# Patient Record
Sex: MALE
[De-identification: names, ages, dates, MRNs, and addresses within clinical notes are randomized per-mention and may not be internally consistent; named-entity substitution may affect disease eponyms.]

---

## 2023-08-30 ENCOUNTER — P2P PATIENT RECORD (OUTPATIENT)
Age: 18
End: 2023-08-30

## 2024-09-09 ENCOUNTER — HOSPITAL ENCOUNTER (EMERGENCY)
Facility: HOSPITAL | Age: 19
Discharge: LEFT BEFORE DISCHARGE | End: 2024-09-09
Admitting: STUDENT IN AN ORGANIZED HEALTH CARE EDUCATION/TRAINING PROGRAM
Payer: COMMERCIAL

## 2024-09-09 ENCOUNTER — APPOINTMENT (OUTPATIENT)
Dept: GENERAL RADIOLOGY | Facility: HOSPITAL | Age: 19
End: 2024-09-09
Payer: MEDICAID

## 2024-09-09 VITALS
HEIGHT: 69 IN | DIASTOLIC BLOOD PRESSURE: 70 MMHG | HEART RATE: 73 BPM | BODY MASS INDEX: 45.91 KG/M2 | TEMPERATURE: 97.3 F | SYSTOLIC BLOOD PRESSURE: 154 MMHG | WEIGHT: 310 LBS | OXYGEN SATURATION: 96 % | RESPIRATION RATE: 20 BRPM

## 2024-09-09 LAB
ALBUMIN SERPL-MCNC: 4.2 G/DL (ref 3.5–5.2)
ALBUMIN/GLOB SERPL: 1.4 G/DL
ALP SERPL-CCNC: 131 U/L (ref 39–117)
ALT SERPL W P-5'-P-CCNC: 33 U/L (ref 1–41)
ANION GAP SERPL CALCULATED.3IONS-SCNC: 11.6 MMOL/L (ref 5–15)
AST SERPL-CCNC: 29 U/L (ref 1–40)
BASOPHILS # BLD AUTO: 0.01 10*3/MM3 (ref 0–0.2)
BASOPHILS NFR BLD AUTO: 0.3 % (ref 0–1.5)
BILIRUB SERPL-MCNC: 0.5 MG/DL (ref 0–1.2)
BUN SERPL-MCNC: 11 MG/DL (ref 6–20)
BUN/CREAT SERPL: 16.2 (ref 7–25)
CALCIUM SPEC-SCNC: 9.6 MG/DL (ref 8.6–10.5)
CHLORIDE SERPL-SCNC: 102 MMOL/L (ref 98–107)
CO2 SERPL-SCNC: 23.4 MMOL/L (ref 22–29)
CREAT SERPL-MCNC: 0.68 MG/DL (ref 0.76–1.27)
DEPRECATED RDW RBC AUTO: 40.6 FL (ref 37–54)
EGFRCR SERPLBLD CKD-EPI 2021: 135.6 ML/MIN/1.73
EOSINOPHIL # BLD AUTO: 0.07 10*3/MM3 (ref 0–0.4)
EOSINOPHIL NFR BLD AUTO: 1.8 % (ref 0.3–6.2)
ERYTHROCYTE [DISTWIDTH] IN BLOOD BY AUTOMATED COUNT: 12.4 % (ref 12.3–15.4)
GLOBULIN UR ELPH-MCNC: 3.1 GM/DL
GLUCOSE SERPL-MCNC: 341 MG/DL (ref 65–99)
HCT VFR BLD AUTO: 37.8 % (ref 37.5–51)
HGB BLD-MCNC: 13.4 G/DL (ref 13–17.7)
HOLD SPECIMEN: NORMAL
HOLD SPECIMEN: NORMAL
IMM GRANULOCYTES # BLD AUTO: 0.01 10*3/MM3 (ref 0–0.05)
IMM GRANULOCYTES NFR BLD AUTO: 0.3 % (ref 0–0.5)
LYMPHOCYTES # BLD AUTO: 1.56 10*3/MM3 (ref 0.7–3.1)
LYMPHOCYTES NFR BLD AUTO: 39.6 % (ref 19.6–45.3)
MCH RBC QN AUTO: 32.3 PG (ref 26.6–33)
MCHC RBC AUTO-ENTMCNC: 35.4 G/DL (ref 31.5–35.7)
MCV RBC AUTO: 91.1 FL (ref 79–97)
MONOCYTES # BLD AUTO: 0.34 10*3/MM3 (ref 0.1–0.9)
MONOCYTES NFR BLD AUTO: 8.6 % (ref 5–12)
NEUTROPHILS NFR BLD AUTO: 1.95 10*3/MM3 (ref 1.7–7)
NEUTROPHILS NFR BLD AUTO: 49.4 % (ref 42.7–76)
NRBC BLD AUTO-RTO: 0 /100 WBC (ref 0–0.2)
PLATELET # BLD AUTO: 125 10*3/MM3 (ref 140–450)
PMV BLD AUTO: 9.9 FL (ref 6–12)
POTASSIUM SERPL-SCNC: 4.1 MMOL/L (ref 3.5–5.2)
PROT SERPL-MCNC: 7.3 G/DL (ref 6–8.5)
RBC # BLD AUTO: 4.15 10*6/MM3 (ref 4.14–5.8)
SODIUM SERPL-SCNC: 137 MMOL/L (ref 136–145)
TROPONIN T SERPL HS-MCNC: 10 NG/L
WBC NRBC COR # BLD AUTO: 3.94 10*3/MM3 (ref 3.4–10.8)
WHOLE BLOOD HOLD COAG: NORMAL
WHOLE BLOOD HOLD SPECIMEN: NORMAL

## 2024-09-09 PROCEDURE — 84484 ASSAY OF TROPONIN QUANT: CPT | Performed by: STUDENT IN AN ORGANIZED HEALTH CARE EDUCATION/TRAINING PROGRAM

## 2024-09-09 PROCEDURE — 85025 COMPLETE CBC W/AUTO DIFF WBC: CPT | Performed by: STUDENT IN AN ORGANIZED HEALTH CARE EDUCATION/TRAINING PROGRAM

## 2024-09-09 PROCEDURE — 93005 ELECTROCARDIOGRAM TRACING: CPT | Performed by: STUDENT IN AN ORGANIZED HEALTH CARE EDUCATION/TRAINING PROGRAM

## 2024-09-09 PROCEDURE — 71045 X-RAY EXAM CHEST 1 VIEW: CPT | Performed by: RADIOLOGY

## 2024-09-09 PROCEDURE — 99284 EMERGENCY DEPT VISIT MOD MDM: CPT

## 2024-09-09 PROCEDURE — 36415 COLL VENOUS BLD VENIPUNCTURE: CPT

## 2024-09-09 PROCEDURE — 71045 X-RAY EXAM CHEST 1 VIEW: CPT

## 2024-09-09 PROCEDURE — 80053 COMPREHEN METABOLIC PANEL: CPT | Performed by: STUDENT IN AN ORGANIZED HEALTH CARE EDUCATION/TRAINING PROGRAM

## 2024-09-09 RX ORDER — ASPIRIN 81 MG/1
324 TABLET, CHEWABLE ORAL ONCE
Status: DISCONTINUED | OUTPATIENT
Start: 2024-09-09 | End: 2024-09-09 | Stop reason: HOSPADM

## 2024-09-09 RX ORDER — SODIUM CHLORIDE 0.9 % (FLUSH) 0.9 %
10 SYRINGE (ML) INJECTION AS NEEDED
Status: DISCONTINUED | OUTPATIENT
Start: 2024-09-09 | End: 2024-09-09 | Stop reason: HOSPADM

## 2024-09-09 NOTE — ED NOTES
Patient reported to ED staff that he wanted to leave against medical advise. Patient left before being provided with the benefits of staying and getting treatment versus leaving against medical advise and having worsening in condition or possible death. Patient left prior to signing AMA form.

## 2024-09-09 NOTE — ED NOTES
MEDICAL SCREENING:    Reason for Visit: Chest Pain    Patient initially seen in triage.  The patient was advised further evaluation and diagnostic testing will be needed, some of the treatment and testing will be initiated in the lobby in order to begin the process.  The patient will be returned to the waiting area for the time being and possibly be re-assessed by a subsequent ED provider.  The patient will be brought back to the treatment area in as timely manner as possible.       Dinh Hernandez,   09/09/24 1939

## 2024-09-10 ENCOUNTER — APPOINTMENT (OUTPATIENT)
Dept: ULTRASOUND IMAGING | Facility: HOSPITAL | Age: 19
End: 2024-09-10
Payer: COMMERCIAL

## 2024-09-10 ENCOUNTER — HOSPITAL ENCOUNTER (EMERGENCY)
Facility: HOSPITAL | Age: 19
Discharge: HOME | End: 2024-09-11
Attending: STUDENT IN AN ORGANIZED HEALTH CARE EDUCATION/TRAINING PROGRAM
Payer: COMMERCIAL

## 2024-09-10 DIAGNOSIS — K82.8 BILIARY DYSKINESIA: Primary | ICD-10-CM

## 2024-09-10 LAB
ALBUMIN SERPL-MCNC: 4.2 G/DL (ref 3.5–5.2)
ALBUMIN/GLOB SERPL: 1.3 G/DL
ALP SERPL-CCNC: 127 U/L (ref 39–117)
ALT SERPL W P-5'-P-CCNC: 30 U/L (ref 1–41)
ANION GAP SERPL CALCULATED.3IONS-SCNC: 12.2 MMOL/L (ref 5–15)
AST SERPL-CCNC: 31 U/L (ref 1–40)
BASOPHILS # BLD AUTO: 0.01 10*3/MM3 (ref 0–0.2)
BASOPHILS NFR BLD AUTO: 0.2 % (ref 0–1.5)
BILIRUB SERPL-MCNC: 0.6 MG/DL (ref 0–1.2)
BILIRUB UR QL STRIP: NEGATIVE
BUN SERPL-MCNC: 13 MG/DL (ref 6–20)
BUN/CREAT SERPL: 17.3 (ref 7–25)
CALCIUM SPEC-SCNC: 10.4 MG/DL (ref 8.6–10.5)
CHLORIDE SERPL-SCNC: 103 MMOL/L (ref 98–107)
CLARITY UR: CLEAR
CO2 SERPL-SCNC: 23.8 MMOL/L (ref 22–29)
COLOR UR: YELLOW
CREAT SERPL-MCNC: 0.75 MG/DL (ref 0.76–1.27)
DEPRECATED RDW RBC AUTO: 40.8 FL (ref 37–54)
EGFRCR SERPLBLD CKD-EPI 2021: 131.7 ML/MIN/1.73
EOSINOPHIL # BLD AUTO: 0.1 10*3/MM3 (ref 0–0.4)
EOSINOPHIL NFR BLD AUTO: 2.1 % (ref 0.3–6.2)
ERYTHROCYTE [DISTWIDTH] IN BLOOD BY AUTOMATED COUNT: 12.5 % (ref 12.3–15.4)
GLOBULIN UR ELPH-MCNC: 3.2 GM/DL
GLUCOSE SERPL-MCNC: 179 MG/DL (ref 65–99)
GLUCOSE UR STRIP-MCNC: ABNORMAL MG/DL
HCT VFR BLD AUTO: 39.2 % (ref 37.5–51)
HGB BLD-MCNC: 13.8 G/DL (ref 13–17.7)
HGB UR QL STRIP.AUTO: NEGATIVE
HOLD SPECIMEN: NORMAL
HOLD SPECIMEN: NORMAL
IMM GRANULOCYTES # BLD AUTO: 0.01 10*3/MM3 (ref 0–0.05)
IMM GRANULOCYTES NFR BLD AUTO: 0.2 % (ref 0–0.5)
KETONES UR QL STRIP: NEGATIVE
LEUKOCYTE ESTERASE UR QL STRIP.AUTO: NEGATIVE
LIPASE SERPL-CCNC: 35 U/L (ref 13–60)
LYMPHOCYTES # BLD AUTO: 1.96 10*3/MM3 (ref 0.7–3.1)
LYMPHOCYTES NFR BLD AUTO: 40.5 % (ref 19.6–45.3)
MCH RBC QN AUTO: 31.9 PG (ref 26.6–33)
MCHC RBC AUTO-ENTMCNC: 35.2 G/DL (ref 31.5–35.7)
MCV RBC AUTO: 90.5 FL (ref 79–97)
MONOCYTES # BLD AUTO: 0.44 10*3/MM3 (ref 0.1–0.9)
MONOCYTES NFR BLD AUTO: 9.1 % (ref 5–12)
NEUTROPHILS NFR BLD AUTO: 2.32 10*3/MM3 (ref 1.7–7)
NEUTROPHILS NFR BLD AUTO: 47.9 % (ref 42.7–76)
NITRITE UR QL STRIP: NEGATIVE
NRBC BLD AUTO-RTO: 0 /100 WBC (ref 0–0.2)
PH UR STRIP.AUTO: 6 [PH] (ref 5–8)
PLATELET # BLD AUTO: 130 10*3/MM3 (ref 140–450)
PMV BLD AUTO: 9.9 FL (ref 6–12)
POTASSIUM SERPL-SCNC: 3.5 MMOL/L (ref 3.5–5.2)
PROT SERPL-MCNC: 7.4 G/DL (ref 6–8.5)
PROT UR QL STRIP: NEGATIVE
RBC # BLD AUTO: 4.33 10*6/MM3 (ref 4.14–5.8)
SODIUM SERPL-SCNC: 139 MMOL/L (ref 136–145)
SP GR UR STRIP: 1.02 (ref 1–1.03)
UROBILINOGEN UR QL STRIP: ABNORMAL
WBC NRBC COR # BLD AUTO: 4.84 10*3/MM3 (ref 3.4–10.8)
WHOLE BLOOD HOLD COAG: NORMAL
WHOLE BLOOD HOLD SPECIMEN: NORMAL

## 2024-09-10 PROCEDURE — 83690 ASSAY OF LIPASE: CPT | Performed by: STUDENT IN AN ORGANIZED HEALTH CARE EDUCATION/TRAINING PROGRAM

## 2024-09-10 PROCEDURE — 80053 COMPREHEN METABOLIC PANEL: CPT | Performed by: STUDENT IN AN ORGANIZED HEALTH CARE EDUCATION/TRAINING PROGRAM

## 2024-09-10 PROCEDURE — 76705 ECHO EXAM OF ABDOMEN: CPT | Performed by: RADIOLOGY

## 2024-09-10 PROCEDURE — 85025 COMPLETE CBC W/AUTO DIFF WBC: CPT | Performed by: STUDENT IN AN ORGANIZED HEALTH CARE EDUCATION/TRAINING PROGRAM

## 2024-09-10 PROCEDURE — 36415 COLL VENOUS BLD VENIPUNCTURE: CPT

## 2024-09-10 PROCEDURE — 99284 EMERGENCY DEPT VISIT MOD MDM: CPT

## 2024-09-10 PROCEDURE — 81003 URINALYSIS AUTO W/O SCOPE: CPT | Performed by: STUDENT IN AN ORGANIZED HEALTH CARE EDUCATION/TRAINING PROGRAM

## 2024-09-10 PROCEDURE — 76705 ECHO EXAM OF ABDOMEN: CPT

## 2024-09-10 RX ORDER — SODIUM CHLORIDE 0.9 % (FLUSH) 0.9 %
10 SYRINGE (ML) INJECTION AS NEEDED
Status: DISCONTINUED | OUTPATIENT
Start: 2024-09-10 | End: 2024-09-11 | Stop reason: HOSPADM

## 2024-09-10 RX ORDER — ONDANSETRON 2 MG/ML
4 INJECTION INTRAMUSCULAR; INTRAVENOUS ONCE
Status: DISCONTINUED | OUTPATIENT
Start: 2024-09-10 | End: 2024-09-11 | Stop reason: HOSPADM

## 2024-09-10 RX ORDER — ONDANSETRON 4 MG/1
4 TABLET, ORALLY DISINTEGRATING ORAL EVERY 6 HOURS PRN
Qty: 20 TABLET | Refills: 0 | Status: SHIPPED | OUTPATIENT
Start: 2024-09-10 | End: 2024-09-23 | Stop reason: HOSPADM

## 2024-09-10 NOTE — ED NOTES
Patient reported to ED staff that he no longer wanted to stay any longer to be seen. Patient left prior to being provided with the risk of leaving and having worsening in his condition versus leaving against medical advise and having worsening in his condition or possible death. Patient left prior to signing AMA form. Lead RN and provider made aware.

## 2024-09-11 VITALS
RESPIRATION RATE: 16 BRPM | HEART RATE: 74 BPM | WEIGHT: 311.8 LBS | DIASTOLIC BLOOD PRESSURE: 79 MMHG | TEMPERATURE: 98.7 F | BODY MASS INDEX: 47.26 KG/M2 | HEIGHT: 68 IN | OXYGEN SATURATION: 94 % | SYSTOLIC BLOOD PRESSURE: 114 MMHG

## 2024-09-11 LAB
QT INTERVAL: 388 MS
QTC INTERVAL: 433 MS

## 2024-09-11 PROCEDURE — 63710000001 ONDANSETRON ODT 4 MG TABLET DISPERSIBLE: Performed by: STUDENT IN AN ORGANIZED HEALTH CARE EDUCATION/TRAINING PROGRAM

## 2024-09-11 RX ORDER — ONDANSETRON 4 MG/1
4 TABLET, ORALLY DISINTEGRATING ORAL ONCE
Status: COMPLETED | OUTPATIENT
Start: 2024-09-11 | End: 2024-09-11

## 2024-09-11 RX ADMIN — ONDANSETRON 4 MG: 4 TABLET, ORALLY DISINTEGRATING ORAL at 01:00

## 2024-09-11 NOTE — ED PROVIDER NOTES
Subjective     History provided by:  Patient  Abdominal Pain  Pain location:  RUQ  Pain quality: gnawing    Pain radiates to:  Does not radiate  Pain severity:  Moderate  Onset quality:  Gradual  Duration:  12 weeks  Timing:  Intermittent  Progression:  Waxing and waning  Chronicity:  Recurrent  Relieved by:  Nothing  Associated symptoms: nausea and vomiting    Associated symptoms: no chest pain, no dysuria and no fever        Review of Systems   Constitutional: Negative.  Negative for fever.   HENT: Negative.     Respiratory: Negative.     Cardiovascular: Negative.  Negative for chest pain.   Gastrointestinal:  Positive for abdominal pain, nausea and vomiting.   Endocrine: Negative.    Genitourinary: Negative.  Negative for dysuria.   Skin: Negative.    Neurological: Negative.    Psychiatric/Behavioral: Negative.     All other systems reviewed and are negative.      No past medical history on file.    No Known Allergies    No past surgical history on file.    No family history on file.    Social History     Socioeconomic History    Marital status: Single           Objective   Physical Exam  Vitals and nursing note reviewed.   Constitutional:       General: He is not in acute distress.     Appearance: He is well-developed. He is not diaphoretic.   HENT:      Head: Normocephalic and atraumatic.      Right Ear: External ear normal.      Left Ear: External ear normal.      Nose: Nose normal.   Eyes:      Conjunctiva/sclera: Conjunctivae normal.   Neck:      Vascular: No JVD.      Trachea: No tracheal deviation.   Cardiovascular:      Rate and Rhythm: Normal rate.      Heart sounds: No murmur heard.  Pulmonary:      Effort: Pulmonary effort is normal. No respiratory distress.      Breath sounds: No wheezing.   Abdominal:      General: Bowel sounds are normal.      Palpations: Abdomen is soft.      Tenderness: There is abdominal tenderness in the right upper quadrant.   Musculoskeletal:         General: No deformity.  Normal range of motion.      Cervical back: Normal range of motion and neck supple.   Skin:     General: Skin is warm and dry.      Coloration: Skin is not pale.      Findings: No erythema or rash.   Neurological:      Mental Status: He is alert and oriented to person, place, and time.      Cranial Nerves: No cranial nerve deficit.   Psychiatric:         Behavior: Behavior normal.         Thought Content: Thought content normal.         Procedures           ED Course  ED Course as of 09/10/24 2353   Tue Sep 10, 2024   2348 US gallbladder rad interpreted:  1.  Partially contracted gallbladder.  2.  No cholelithiasis  3.  No cholecystitis  4.  Normal common bile duct  5.  No free fluid in the right upper quadrant.   [RB]      ED Course User Index  [RB] Zane Santamaria II, PA                                             Medical Decision Making  Amount and/or Complexity of Data Reviewed  Labs: ordered.  Radiology: ordered.    Risk  Prescription drug management.        Final diagnoses:   Biliary dyskinesia       ED Disposition  ED Disposition       ED Disposition   Discharge    Condition   Stable    Comment   --               Mirta Srinivasan MD  [address] [facility] [address]  [facility] [address] [phone]    Schedule an appointment as soon as possible for a visit            Medication List        New Prescriptions      ondansetron ODT 4 MG disintegrating tablet  Commonly known as: ZOFRAN-ODT  Place 1 tablet on the tongue Every 6 (Six) Hours As Needed for Nausea or Vomiting.               Where to Get Your Medications        These medications were sent to [facility] [address] [place] [address] - [phone]  - [phone]   [address] [address]      Phone: [phone]   ondansetron ODT 4 MG disintegrating tablet            Zane Santamaria II, PA  09/10/24 7332

## 2024-09-11 NOTE — ED NOTES
Called [facility] for transportation back; they stated they would be on their way to get him now. Patient updated.

## 2024-09-11 NOTE — ED NOTES
MEDICAL SCREENING:    Reason for Visit: RUQ abdominal pain    Patient initially seen in triage.  The patient was advised further evaluation and diagnostic testing will be needed, some of the treatment and testing will be initiated in the lobby in order to begin the process.  The patient will be returned to the waiting area for the time being and possibly be re-assessed by a subsequent ED provider.  The patient will be brought back to the treatment area in as timely manner as possible.     Va Almeida, APRN  09/10/24 2139

## 2024-09-17 ENCOUNTER — HOSPITAL ENCOUNTER (EMERGENCY)
Facility: HOSPITAL | Age: 19
Discharge: PSYCHIATRIC OR SUBSTANCE USE FACILITY | DRG: 885 | End: 2024-09-18
Attending: EMERGENCY MEDICINE | Admitting: EMERGENCY MEDICINE
Payer: COMMERCIAL

## 2024-09-17 VITALS
TEMPERATURE: 98.4 F | HEART RATE: 87 BPM | RESPIRATION RATE: 17 BRPM | BODY MASS INDEX: 45.47 KG/M2 | WEIGHT: 307 LBS | DIASTOLIC BLOOD PRESSURE: 76 MMHG | OXYGEN SATURATION: 97 % | SYSTOLIC BLOOD PRESSURE: 129 MMHG | HEIGHT: 69 IN

## 2024-09-17 DIAGNOSIS — R45.851 SUICIDAL IDEATIONS: Primary | ICD-10-CM

## 2024-09-17 LAB
ALBUMIN SERPL-MCNC: 4 G/DL (ref 3.5–5.2)
ALBUMIN/GLOB SERPL: 1.3 G/DL
ALP SERPL-CCNC: 119 U/L (ref 39–117)
ALT SERPL W P-5'-P-CCNC: 34 U/L (ref 1–41)
AMPHET+METHAMPHET UR QL: NEGATIVE
AMPHETAMINES UR QL: NEGATIVE
ANION GAP SERPL CALCULATED.3IONS-SCNC: 9.3 MMOL/L (ref 5–15)
AST SERPL-CCNC: 35 U/L (ref 1–40)
BARBITURATES UR QL SCN: NEGATIVE
BASOPHILS # BLD AUTO: 0.01 10*3/MM3 (ref 0–0.2)
BASOPHILS NFR BLD AUTO: 0.3 % (ref 0–1.5)
BENZODIAZ UR QL SCN: NEGATIVE
BILIRUB SERPL-MCNC: 0.5 MG/DL (ref 0–1.2)
BILIRUB UR QL STRIP: NEGATIVE
BUN SERPL-MCNC: 13 MG/DL (ref 6–20)
BUN/CREAT SERPL: 19.7 (ref 7–25)
BUPRENORPHINE SERPL-MCNC: NEGATIVE NG/ML
CALCIUM SPEC-SCNC: 9.4 MG/DL (ref 8.6–10.5)
CANNABINOIDS SERPL QL: NEGATIVE
CHLORIDE SERPL-SCNC: 106 MMOL/L (ref 98–107)
CLARITY UR: ABNORMAL
CO2 SERPL-SCNC: 21.7 MMOL/L (ref 22–29)
COCAINE UR QL: NEGATIVE
COLOR UR: YELLOW
CREAT SERPL-MCNC: 0.66 MG/DL (ref 0.76–1.27)
DEPRECATED RDW RBC AUTO: 40.8 FL (ref 37–54)
EGFRCR SERPLBLD CKD-EPI 2021: 136.9 ML/MIN/1.73
EOSINOPHIL # BLD AUTO: 0.08 10*3/MM3 (ref 0–0.4)
EOSINOPHIL NFR BLD AUTO: 2.2 % (ref 0.3–6.2)
ERYTHROCYTE [DISTWIDTH] IN BLOOD BY AUTOMATED COUNT: 12.4 % (ref 12.3–15.4)
ETHANOL BLD-MCNC: <10 MG/DL (ref 0–10)
ETHANOL UR QL: <0.01 %
FENTANYL UR-MCNC: NEGATIVE NG/ML
GLOBULIN UR ELPH-MCNC: 3 GM/DL
GLUCOSE SERPL-MCNC: 272 MG/DL (ref 65–99)
GLUCOSE UR STRIP-MCNC: ABNORMAL MG/DL
HCT VFR BLD AUTO: 36.1 % (ref 37.5–51)
HGB BLD-MCNC: 12.9 G/DL (ref 13–17.7)
HGB UR QL STRIP.AUTO: NEGATIVE
HOLD SPECIMEN: NORMAL
HOLD SPECIMEN: NORMAL
IMM GRANULOCYTES # BLD AUTO: 0.01 10*3/MM3 (ref 0–0.05)
IMM GRANULOCYTES NFR BLD AUTO: 0.3 % (ref 0–0.5)
KETONES UR QL STRIP: NEGATIVE
LEUKOCYTE ESTERASE UR QL STRIP.AUTO: NEGATIVE
LYMPHOCYTES # BLD AUTO: 1.53 10*3/MM3 (ref 0.7–3.1)
LYMPHOCYTES NFR BLD AUTO: 42.1 % (ref 19.6–45.3)
MAGNESIUM SERPL-MCNC: 1.7 MG/DL (ref 1.6–2.6)
MCH RBC QN AUTO: 32.3 PG (ref 26.6–33)
MCHC RBC AUTO-ENTMCNC: 35.7 G/DL (ref 31.5–35.7)
MCV RBC AUTO: 90.3 FL (ref 79–97)
METHADONE UR QL SCN: NEGATIVE
MONOCYTES # BLD AUTO: 0.38 10*3/MM3 (ref 0.1–0.9)
MONOCYTES NFR BLD AUTO: 10.5 % (ref 5–12)
NEUTROPHILS NFR BLD AUTO: 1.62 10*3/MM3 (ref 1.7–7)
NEUTROPHILS NFR BLD AUTO: 44.6 % (ref 42.7–76)
NITRITE UR QL STRIP: NEGATIVE
NRBC BLD AUTO-RTO: 0 /100 WBC (ref 0–0.2)
OPIATES UR QL: NEGATIVE
OXYCODONE UR QL SCN: NEGATIVE
PCP UR QL SCN: NEGATIVE
PH UR STRIP.AUTO: 7 [PH] (ref 5–8)
PLATELET # BLD AUTO: 104 10*3/MM3 (ref 140–450)
PMV BLD AUTO: 10 FL (ref 6–12)
POTASSIUM SERPL-SCNC: 4 MMOL/L (ref 3.5–5.2)
PROT SERPL-MCNC: 7 G/DL (ref 6–8.5)
PROT UR QL STRIP: NEGATIVE
RBC # BLD AUTO: 4 10*6/MM3 (ref 4.14–5.8)
SODIUM SERPL-SCNC: 137 MMOL/L (ref 136–145)
SP GR UR STRIP: 1.03 (ref 1–1.03)
TRICYCLICS UR QL SCN: NEGATIVE
UROBILINOGEN UR QL STRIP: ABNORMAL
WBC NRBC COR # BLD AUTO: 3.63 10*3/MM3 (ref 3.4–10.8)
WHOLE BLOOD HOLD COAG: NORMAL
WHOLE BLOOD HOLD SPECIMEN: NORMAL

## 2024-09-17 PROCEDURE — 80053 COMPREHEN METABOLIC PANEL: CPT | Performed by: EMERGENCY MEDICINE

## 2024-09-17 PROCEDURE — 83735 ASSAY OF MAGNESIUM: CPT | Performed by: EMERGENCY MEDICINE

## 2024-09-17 PROCEDURE — 36415 COLL VENOUS BLD VENIPUNCTURE: CPT

## 2024-09-17 PROCEDURE — 99285 EMERGENCY DEPT VISIT HI MDM: CPT

## 2024-09-17 PROCEDURE — 81003 URINALYSIS AUTO W/O SCOPE: CPT | Performed by: EMERGENCY MEDICINE

## 2024-09-17 PROCEDURE — 82077 ASSAY SPEC XCP UR&BREATH IA: CPT | Performed by: EMERGENCY MEDICINE

## 2024-09-17 PROCEDURE — 85025 COMPLETE CBC W/AUTO DIFF WBC: CPT | Performed by: EMERGENCY MEDICINE

## 2024-09-17 PROCEDURE — 80307 DRUG TEST PRSMV CHEM ANLYZR: CPT | Performed by: EMERGENCY MEDICINE

## 2024-09-18 ENCOUNTER — HOSPITAL ENCOUNTER (INPATIENT)
Facility: HOSPITAL | Age: 19
LOS: 5 days | Discharge: NURSING, REHAB OR LONG-TERM CARE | End: 2024-09-23
Attending: PSYCHIATRY & NEUROLOGY | Admitting: PSYCHIATRY & NEUROLOGY
Payer: COMMERCIAL

## 2024-09-18 PROBLEM — R45.89 SUICIDAL BEHAVIOR: Status: ACTIVE | Noted: 2024-09-18

## 2024-09-18 LAB
GLUCOSE BLDC GLUCOMTR-MCNC: 177 MG/DL (ref 70–130)
GLUCOSE BLDC GLUCOMTR-MCNC: 208 MG/DL (ref 70–130)
GLUCOSE BLDC GLUCOMTR-MCNC: 261 MG/DL (ref 70–130)
GLUCOSE BLDC GLUCOMTR-MCNC: 273 MG/DL (ref 70–130)
GLUCOSE BLDC GLUCOMTR-MCNC: 320 MG/DL (ref 70–130)
HAV IGM SERPL QL IA: NORMAL
HBV CORE IGM SERPL QL IA: NORMAL
HBV SURFACE AG SERPL QL IA: NORMAL
HCV AB SER QL: NORMAL
QT INTERVAL: 408 MS
QTC INTERVAL: 443 MS

## 2024-09-18 PROCEDURE — 93005 ELECTROCARDIOGRAM TRACING: CPT | Performed by: PSYCHIATRY & NEUROLOGY

## 2024-09-18 PROCEDURE — 99223 1ST HOSP IP/OBS HIGH 75: CPT | Performed by: PSYCHIATRY & NEUROLOGY

## 2024-09-18 PROCEDURE — 63710000001 INSULIN GLARGINE PER 5 UNITS: Performed by: PSYCHIATRY & NEUROLOGY

## 2024-09-18 PROCEDURE — 63710000001 INSULIN LISPRO (HUMAN) PER 5 UNITS: Performed by: PSYCHIATRY & NEUROLOGY

## 2024-09-18 PROCEDURE — 82948 REAGENT STRIP/BLOOD GLUCOSE: CPT

## 2024-09-18 PROCEDURE — 93010 ELECTROCARDIOGRAM REPORT: CPT | Performed by: INTERNAL MEDICINE

## 2024-09-18 PROCEDURE — 80074 ACUTE HEPATITIS PANEL: CPT | Performed by: PSYCHIATRY & NEUROLOGY

## 2024-09-18 RX ORDER — IBUPROFEN 400 MG/1
400 TABLET, FILM COATED ORAL EVERY 6 HOURS PRN
Status: DISCONTINUED | OUTPATIENT
Start: 2024-09-18 | End: 2024-09-19

## 2024-09-18 RX ORDER — OXCARBAZEPINE 300 MG/1
300 TABLET, FILM COATED ORAL 2 TIMES DAILY
COMMUNITY
End: 2024-09-23 | Stop reason: HOSPADM

## 2024-09-18 RX ORDER — LURASIDONE HYDROCHLORIDE 40 MG/1
20 TABLET, FILM COATED ORAL
Status: DISCONTINUED | OUTPATIENT
Start: 2024-09-18 | End: 2024-09-23 | Stop reason: HOSPADM

## 2024-09-18 RX ORDER — HYDROXYZINE HYDROCHLORIDE 50 MG/1
50 TABLET, FILM COATED ORAL EVERY 6 HOURS PRN
Status: DISCONTINUED | OUTPATIENT
Start: 2024-09-18 | End: 2024-09-23 | Stop reason: HOSPADM

## 2024-09-18 RX ORDER — BENZTROPINE MESYLATE 1 MG/ML
1 INJECTION, SOLUTION INTRAMUSCULAR; INTRAVENOUS ONCE AS NEEDED
Status: DISCONTINUED | OUTPATIENT
Start: 2024-09-18 | End: 2024-09-23 | Stop reason: HOSPADM

## 2024-09-18 RX ORDER — FAMOTIDINE 20 MG/1
20 TABLET, FILM COATED ORAL 2 TIMES DAILY PRN
Status: DISCONTINUED | OUTPATIENT
Start: 2024-09-18 | End: 2024-09-23 | Stop reason: HOSPADM

## 2024-09-18 RX ORDER — BENZTROPINE MESYLATE 1 MG/1
2 TABLET ORAL ONCE AS NEEDED
Status: DISCONTINUED | OUTPATIENT
Start: 2024-09-18 | End: 2024-09-23 | Stop reason: HOSPADM

## 2024-09-18 RX ORDER — POLYETHYLENE GLYCOL 3350 17 G/17G
17 POWDER, FOR SOLUTION ORAL DAILY PRN
Status: DISCONTINUED | OUTPATIENT
Start: 2024-09-18 | End: 2024-09-23 | Stop reason: HOSPADM

## 2024-09-18 RX ORDER — DEXTROSE MONOHYDRATE 25 G/50ML
25 INJECTION, SOLUTION INTRAVENOUS
Status: DISCONTINUED | OUTPATIENT
Start: 2024-09-18 | End: 2024-09-23 | Stop reason: HOSPADM

## 2024-09-18 RX ORDER — HYDROXYZINE HYDROCHLORIDE 25 MG/1
25 TABLET, FILM COATED ORAL EVERY 4 HOURS PRN
Status: CANCELLED | OUTPATIENT
Start: 2024-09-18

## 2024-09-18 RX ORDER — ALUMINA, MAGNESIA, AND SIMETHICONE 2400; 2400; 240 MG/30ML; MG/30ML; MG/30ML
15 SUSPENSION ORAL EVERY 6 HOURS PRN
Status: DISCONTINUED | OUTPATIENT
Start: 2024-09-18 | End: 2024-09-23 | Stop reason: HOSPADM

## 2024-09-18 RX ORDER — LOPERAMIDE HCL 2 MG
2 CAPSULE ORAL
Status: DISCONTINUED | OUTPATIENT
Start: 2024-09-18 | End: 2024-09-23 | Stop reason: HOSPADM

## 2024-09-18 RX ORDER — RISPERIDONE 2 MG/1
2 TABLET ORAL 2 TIMES DAILY
COMMUNITY
End: 2024-09-23 | Stop reason: HOSPADM

## 2024-09-18 RX ORDER — LAMOTRIGINE 100 MG/1
25 TABLET ORAL DAILY
Status: DISCONTINUED | OUTPATIENT
Start: 2024-09-18 | End: 2024-09-23 | Stop reason: HOSPADM

## 2024-09-18 RX ORDER — TRAZODONE HYDROCHLORIDE 50 MG/1
50 TABLET, FILM COATED ORAL NIGHTLY PRN
Status: DISCONTINUED | OUTPATIENT
Start: 2024-09-18 | End: 2024-09-18

## 2024-09-18 RX ORDER — OXCARBAZEPINE 300 MG/1
300 TABLET, FILM COATED ORAL 2 TIMES DAILY
Status: CANCELLED | OUTPATIENT
Start: 2024-09-18

## 2024-09-18 RX ORDER — TRAZODONE HYDROCHLORIDE 50 MG/1
50 TABLET, FILM COATED ORAL NIGHTLY
Status: DISCONTINUED | OUTPATIENT
Start: 2024-09-18 | End: 2024-09-23 | Stop reason: HOSPADM

## 2024-09-18 RX ORDER — BENZONATATE 100 MG/1
100 CAPSULE ORAL 3 TIMES DAILY PRN
Status: DISCONTINUED | OUTPATIENT
Start: 2024-09-18 | End: 2024-09-23 | Stop reason: HOSPADM

## 2024-09-18 RX ORDER — ONDANSETRON 4 MG/1
4 TABLET, ORALLY DISINTEGRATING ORAL EVERY 6 HOURS PRN
Status: DISCONTINUED | OUTPATIENT
Start: 2024-09-18 | End: 2024-09-23 | Stop reason: HOSPADM

## 2024-09-18 RX ORDER — NICOTINE POLACRILEX 4 MG
15 LOZENGE BUCCAL
Status: DISCONTINUED | OUTPATIENT
Start: 2024-09-18 | End: 2024-09-23 | Stop reason: HOSPADM

## 2024-09-18 RX ORDER — GLUCAGON 1 MG/ML
1 KIT INJECTION
Status: DISCONTINUED | OUTPATIENT
Start: 2024-09-18 | End: 2024-09-23 | Stop reason: HOSPADM

## 2024-09-18 RX ORDER — INSULIN LISPRO 100 [IU]/ML
2-9 INJECTION, SOLUTION INTRAVENOUS; SUBCUTANEOUS
Status: DISCONTINUED | OUTPATIENT
Start: 2024-09-18 | End: 2024-09-23 | Stop reason: HOSPADM

## 2024-09-18 RX ORDER — INSULIN GLARGINE 100 [IU]/ML
22 INJECTION, SOLUTION SUBCUTANEOUS 2 TIMES DAILY
Status: ON HOLD | COMMUNITY
End: 2024-09-23

## 2024-09-18 RX ORDER — ECHINACEA PURPUREA EXTRACT 125 MG
2 TABLET ORAL AS NEEDED
Status: DISCONTINUED | OUTPATIENT
Start: 2024-09-18 | End: 2024-09-23 | Stop reason: HOSPADM

## 2024-09-18 RX ORDER — HYDROXYZINE HYDROCHLORIDE 25 MG/1
25 TABLET, FILM COATED ORAL EVERY 4 HOURS PRN
COMMUNITY

## 2024-09-18 RX ORDER — ACETAMINOPHEN 325 MG/1
650 TABLET ORAL EVERY 6 HOURS PRN
Status: DISCONTINUED | OUTPATIENT
Start: 2024-09-18 | End: 2024-09-23 | Stop reason: HOSPADM

## 2024-09-18 RX ORDER — RISPERIDONE 1 MG/1
2 TABLET ORAL 2 TIMES DAILY
Status: CANCELLED | OUTPATIENT
Start: 2024-09-18

## 2024-09-18 RX ADMIN — TRAZODONE HYDROCHLORIDE 50 MG: 50 TABLET ORAL at 21:03

## 2024-09-18 RX ADMIN — LURASIDONE HYDROCHLORIDE 20 MG: 40 TABLET, FILM COATED ORAL at 17:00

## 2024-09-18 RX ADMIN — IBUPROFEN 400 MG: 400 TABLET, FILM COATED ORAL at 08:32

## 2024-09-18 RX ADMIN — INSULIN GLARGINE 22 UNITS: 100 INJECTION, SOLUTION SUBCUTANEOUS at 10:14

## 2024-09-18 RX ADMIN — METFORMIN HYDROCHLORIDE 500 MG: 500 TABLET ORAL at 10:14

## 2024-09-18 RX ADMIN — LAMOTRIGINE 25 MG: 100 TABLET ORAL at 10:14

## 2024-09-18 RX ADMIN — INSULIN LISPRO 6 UNITS: 100 INJECTION, SOLUTION INTRAVENOUS; SUBCUTANEOUS at 11:29

## 2024-09-18 RX ADMIN — INSULIN LISPRO 6 UNITS: 100 INJECTION, SOLUTION INTRAVENOUS; SUBCUTANEOUS at 16:47

## 2024-09-18 RX ADMIN — METFORMIN HYDROCHLORIDE 500 MG: 500 TABLET ORAL at 17:00

## 2024-09-18 RX ADMIN — TRAZODONE HYDROCHLORIDE 50 MG: 50 TABLET ORAL at 02:54

## 2024-09-18 RX ADMIN — INSULIN LISPRO 4 UNITS: 100 INJECTION, SOLUTION INTRAVENOUS; SUBCUTANEOUS at 08:33

## 2024-09-18 RX ADMIN — IBUPROFEN 400 MG: 400 TABLET, FILM COATED ORAL at 02:54

## 2024-09-18 RX ADMIN — INSULIN GLARGINE 22 UNITS: 100 INJECTION, SOLUTION SUBCUTANEOUS at 21:02

## 2024-09-18 RX ADMIN — INSULIN LISPRO 7 UNITS: 100 INJECTION, SOLUTION INTRAVENOUS; SUBCUTANEOUS at 21:03

## 2024-09-18 NOTE — PROGRESS NOTES
3767    DATA:      Therapist met individually with patient this date to introduce role and to discuss hospitalization expectations. Patient agreeable. Reviewed medical record and staffed case with treatment team this date. No major issues identified.       Clinical Maneuvering/Intervention:     Therapist assisted patient in processing above session content; acknowledged and normalized patient’s thoughts, feelings, and concerns.  Discussed the therapist/patient relationship and explain the parameters and limitations of relative confidentiality.  Also discussed the importance of active participation, and honesty to the treatment process.  Encouraged the patient to discuss/vent their feelings, frustrations, and fears concerning their ongoing medical issues and validated their feelings.     Allowed patient to freely discuss issues without interruption or judgment. Provided safe, confidential environment to facilitate the development of positive therapeutic relationship and encourage open, honest communication.      Concern was voiced regarding substance use and educated patient on risks associated with use. Patient was advised to abstain from use and educated on community resources that can help with sobriety and recovery.  Patient signed consent for [facility].     Therapist addressed discharge safety planning this date. Assisted patient in identifying risk factors which would indicate the need for higher level of care after discharge;  including thoughts to harm self or others and/or self-harming behavior. Encouraged patient to call [phone],  [phone], or present to the nearest emergency room should any of these events occur. Discussed crisis intervention services and means to access.  Encouraged securing any objects of harm.       Therapist completed integrated summary, treatment plan, and initiated social history this date.  Therapist is strongly encouraging family involvement in treatment.      "  ASSESSMENT:      The patient is a 19 year old  male. Patient is 12th grade educated, special education. He is unemployed and residing at [facility].  He was raised by his parents in [place] and denies history of abuse. He has court in October for DV charges.     Per report, \"Pt presents to intake with suicidal ideation. Pt states he's been having thoughts of cutting himself with the intent of suicide. Pt states that he's been having these thoughts for the past week. He states that nothing has happened the past week to trigger these thoughts, he's just started having them again. Pt did state that he quit drinking a little over two weeks ago and has been in rehab since. Pt states that his depression and anxiety both are 8/10. Pt states that he has struggled with depression, anxiety and suicidal thoughts for a long time and has had one actual suicide attempt from cutting his wrists.\"     Today, patient was seen 1-1 in the office. Patient calm, cooperative and oriented x 4.  Patient noted to have restricted affect, congruent affect, normal thought content, linear thought processes.  Patient denies SI/HI/AVH today. He rates depression at 7/10 and anxiety at 8/10.  Patient reports poor sleep, good appetite.      Patient opens up today. He shares that he has been at [facility] x 1 week.  He reports that he is to recover from alcohol dependence and has 1 week sober. Patient reports that he has DV charges and court next month. He had been staying with his father in a motel and they had been yelling at eachother and arguing. The owner called the police and patient was charged with DV.  He was living in a shelter in [facility]. One day he was walking on the side of the road and met someone that got him in to [facility] in [facility].  He reports that he likes it there, but is just feeling overwhelmed and misses his family.      Today, patient signed consent to involve his grandmother " Bella at [phone]; No answer, left voicemail.      PLAN:       Patient to remain hospitalized this date.      Treatment team will focus efforts on stabilizing patient's acute symptoms while providing education on healthy coping and crisis management to reduce hospitalizations.   Patient requires daily psychiatrist evaluation and 24/7 nursing supervision to promote patient  safety.     Therapist will offer 1-4 individual sessions, family education, and appropriate referral.     Therapist recommends continued evaluation. Patient signed consent to return to [facility] via their staff at discharge.

## 2024-09-18 NOTE — PHARMACY PATIENT ASSISTANCE
Pharmacy checked on price of Lurasidone initiated inpatient. Per patient's plan, copay will be $0 for 1 month supply. No other issues identified at this time.    Thank you,    Page Lin, PharmD  09/18/24  14:14 EDT

## 2024-09-18 NOTE — NURSING NOTE
[facility] STAFF DROPPED OFF THE PATIENTS BELONGINGS. 5 GARBAGE BAGS OF BELONGINGS, PHONE AND EARBUDS. PHONE AND EARBUDS WERE PLACED IN THE UNIT SAFE WITH OTHER PERSONAL BELONGINGS. THE 5 GARBAGE BAGS WERE NOT INVENTORIED. THEY WERE WANDED, DO NOT OPEN SIGNS APPLIED AND PLACED IN THE UNIT CLOTHING . CLOSET.

## 2024-09-18 NOTE — NURSING NOTE
Pt reports SI to cut himself. Prior suicide attempt, scar on left wrist. Pt brought from [facility], plans to return at discharge. Pt denies HI/AVH. Denies any traumatic events or abuse. History of alcohol abuse. Stopped drinking two weeks ago. UDS negative. Reports good sleep and appetite. Rates anxiety 8/10, depression 9/10.      Medical: Diabetes, Bipolar, HTN.      Labs: glucose 272, platelets 104, RBC 4.0

## 2024-09-18 NOTE — H&P
"                                                        Psychiatry Inpatient Initial Eval (H+P)    Clinician: Brent Galindo MD      CC:SI    HPI:   Patient was admitted on the unit on 9/18/2024 and was evaluated on 09/18/24   19 y.o. male presented to ER with SI and intent, with thoughts of cutting himself.  He reported that he had been having these thoughts for the past week.  He is staying at [facility], working to maintain sobriety from alcohol.    Patient endorses depressed mood, sx of anhedonia, low energy, fatigue, decreased motivation.   Screening questions reveal a h/o prior episodes of depression, and patient responds \"yeah\" when asked if he has experienced episodes of elevated energy, euphoria, and decreased need for sleep lasting up to 3-4 days at a time.    No history of psychosis.        Available medical/psychiatric records reviewed and incorporated into the current document.     Past Psychiatric History: h/o Dx of ADHD, Bipolar Disorder, anxiety. Outpatient provider = [facility] in [place].  Patient reports a h/o numerous inpatient psy admissions, none here.  Last admission July 2024.  He has a h/o \"more than 3\" suicide attempts.      Substance Abuse History: long h/o alcohol dependence.  Sober for 1 week. Denies other drug use.    Social History:  Lives in [place], [place] with his father.   Completed High School.   Unemployed.  Does not draw disability.  Legal: court date on Oct 24, 2024 for domestic violence.  Spent 2 weeks in [facility].      Family History:   Father - Bipolar.     No Known Allergies     Past Medical History:   Diagnosis Date    Anxiety     Bipolar disorder     Depression     Diabetes     HTN (hypertension)     Suicidal behavior        Prior to Admission medications    Medication Sig Start Date End Date Taking? Authorizing Provider   hydrOXYzine (ATARAX) 25 MG tablet Take 1 tablet by mouth Every 4 (Four) Hours As Needed for Itching or Anxiety.   Yes Provider, Historical, " MD   Insulin Glargine (Lantus SoloStar) 100 UNIT/ML injection pen Inject 22 Units under the skin into the appropriate area as directed 2 (Two) Times a Day.   Yes Aki Varner MD   metFORMIN (GLUCOPHAGE) 500 MG tablet Take 1 tablet by mouth 2 (Two) Times a Day With Meals.   Yes Aki Varner MD   ondansetron ODT (ZOFRAN-ODT) 4 MG disintegrating tablet Place 1 tablet on the tongue Every 6 (Six) Hours As Needed for Nausea or Vomiting. 9/10/24  Yes Zane Santamaria II, PA   OXcarbazepine (TRILEPTAL) 300 MG tablet Take 1 tablet by mouth 2 (Two) Times a Day.   Yes Aki Varner MD   risperiDONE (risperDAL) 2 MG tablet Take 1 tablet by mouth 2 (Two) Times a Day.   Yes Aki Varner MD   sertraline (ZOLOFT) 50 MG tablet Take 1 tablet by mouth Daily.   Yes Aki Varner MD        Temp:  [96.6 °F (35.9 °C)-98.4 °F (36.9 °C)] 97.7 °F (36.5 °C)  Heart Rate:  [70-87] 70  Resp:  [17-18] 18  BP: (107-129)/(50-76) 107/50      Mental Status Exam  Mood: depressed  Affect: mood congruent  Thought Processes: linear  Thought Content: No delusions voiced  Hallucinations: Denies  Suicidal Thoughts: yes  Suicidal Plan/Intent: Denies  Hopelesness: Moderate      Review of Systems   Constitutional: Negative.    HENT: Negative.     Eyes: Negative.    Respiratory: Negative.     Cardiovascular: Negative.    Gastrointestinal: Negative.    Endocrine: Negative.    Genitourinary: Negative.    Musculoskeletal: Negative.    Skin: Negative.    Allergic/Immunologic: Negative.    Neurological: Negative.    Hematological: Negative.    All other systems reviewed and are negative.       Telemedicine Physical Exam:  Patient appears well-nourished, well-hydrated, and is alert and oriented. No acute distress noted. Moderately obese.  Skin: No visible rashes, lesions, or discoloration on exposed areas.  Head and Neck: Normocephalic, atraumatic; neck supple without visible masses.  Eyes: Pupils equal, round; visual acuity  appears normal, no reported changes in vision.  Ears: Hearing intact, no pain or discharge.  Nose: No deformity, patient denies congestion.  Throat: No sore throat, difficulty swallowing, or voice changes; oral mucosa intact.  Respiratory: Normal breathing reported, no audible wheezing.  Cardiovascular: No chest pain, palpitations, or edema; no cyanosis.  Abdomen: No pain, nausea, vomiting, or changes in bowel habits; no visible distension.  Musculoskeletal: Full range of motion in major joints; no deformities, swelling, or pain reported.  Neurological: Cranial Nerves I-XII assessed remotely. CN I: Reports intact smell sensation. CN II: Visual acuity self-reported as normal, visual fields grossly intact. CN III, IV, VI: EOMs intact, PERRLA self-reported. CN V: Reports intact facial sensation, jaw movements symmetrical. CN VII: Facial movements symmetrical on visual assessment. CN VIII: Hearing reported as intact, balance not assessed. CN IX, X: Palate elevation and voice quality appear normal. CN XI: Shoulder shrug and head rotation appear symmetrical. CN XII: Tongue midline with full range of motion on visual assessment. Limitations of telemedicine include inability to directly test visual fields, pupillary reaction, and detailed sensory or motor function.  Muscle strength appears symmetrical in all major muscle groups, as observed through simple movements (e.g., arm raises, leg lifts). No involuntary movements are noted. Patient demonstrates normal coordination through finger-to-nose testing. Gait and balance not fully assessed due to telemedicine limitations, but patient denies issues with walking or balance. Sensation to light touch is reported as symmetrical on arms and legs using household items; no numbness or tingling reported. Deep tendon reflexes not assessed due to telemedicine limitations.    Limitations: Telemedicine limits detailed exams, e.g. palpation, auscultation, and fine neurological assessments;  visual fields and pupillary reactions not fully assessed.        Labs:  Lab Results (all)       Procedure Component Value Units Date/Time    POC Glucose Once [[id number]]  (Abnormal) Collected: 09/18/24 0744    Specimen: Blood Updated: 09/18/24 0750     Glucose 208 mg/dL     Hepatitis Panel, Acute [[id number]]  (Normal) Collected: 09/18/24 0319    Specimen: Blood Updated: 09/18/24 0346     Hepatitis B Surface Ag Non-Reactive     Hep A IgM Non-Reactive     Hep B C IgM Non-Reactive     Hepatitis C Ab Non-Reactive    Narrative:      Results may be falsely decreased if patient taking Biotin.             Imaging:  Imaging Results (All)       None              Diagnosis:  Suicidal Ideation  Bipolar II Disorder    Given the high risk and/or potentially life-threatening nature of patient's presenting symptoms, patient has been admitted for safety and stabilization and placed on the appropriate level of precautions.  Patient will be assigned a Master's level therapist and encouraged to participate in both individual and group therapy on the unit.  Routine labs have been ordered.    CODE STATUS: Full Code     Hospital bed: No    Stop Risperdal  Stop zoloft  Stop Trileptal  Start trial of latuda 20mg daily with dinner  Start trial of lamotrigine 25mg Daily  Check TSH    Insomnia  - Trazodone 50mg at bedtime    DM2  - metformin 500mg twice a day  - Insulin Glargine (Lantus SoloStar) 22 units twice a day  - SSI        I have discussed with the patient the risks, benefits, side effects, and treatment alternatives to the patient's psychiatric medications. Patient has also been instructed regarding the risks versus benefits of no treatment and also the fact that treatment does not guarantee results.  Patient voices an understanding of this and accepts the risks associated with the use of this medication.   Patient is instructed to review the medication information packets provided by the pharmacy and to call me with any questions or  concerns related to the medication.  Patient agrees to notify all of their prescribers of the recent medication change, and to notify me immediately if prescribed any other medication by another provider, so as to allow me to verify that the medications I am prescribing do not interfere with the newly prescribed medication.     Further treatment and disposition planning will be developed prospectively.

## 2024-09-18 NOTE — PLAN OF CARE
Goal Outcome Evaluation:  Plan of Care Reviewed With: patient  Patient Agreement with Plan of Care: agrees     Progress: no change  Outcome Evaluation: New admit. Care plan iniated.

## 2024-09-18 NOTE — PLAN OF CARE
Problem: Adult Behavioral Health Plan of Care  Goal: Plan of Care Review  Outcome: Ongoing, Progressing  Flowsheets (Taken 9/18/2024 1107)  Consent Given to Review Plan with: Grandmother Bella  Progress: no change  Plan of Care Reviewed With: patient  Patient Agreement with Plan of Care: agrees  Outcome Evaluation: New admit. Completed social history and integrated summary  Goal: Patient-Specific Goal (Individualization)  Outcome: Ongoing, Progressing  Flowsheets  Taken 9/18/2024 1107  Patient-Specific Goals (Include Timeframe): Patient will deny SI/HI in 1-4 days. Patient will identify 1-3 healthy coping skills for depression in 1-4 days. Patient will consent to [facility] referral in 1-4 days. Patient will demonstrate improved depression symptoms in 1-7 days.  Individualized Care Needs: Therapist will offer 1-4 individual sessions focusing on CBT/DBT and relapse prevention. Therapist will offer family and safety planning. Therapist will offer aftercare referral with [facility]  Anxieties, Fears or Concerns: none identified  Taken 9/18/2024 1047  Patient Personal Strengths:   expressive of needs   expressive of emotions   coping skills   community support   realistic evaluation of current/future capabilities   motivated for treatment   motivated for recovery   positive educational history   spiritual/Church support   family/social support   resourceful   resilient  Patient Vulnerabilities:   poor impulse control   substance abuse/addiction   legal concerns  Goal: Optimized Coping Skills in Response to Life Stressors  Outcome: Ongoing, Progressing  Intervention: Promote Effective Coping Strategies  Flowsheets (Taken 9/18/2024 1107)  Supportive Measures:   active listening utilized   counseling provided  Goal: Develops/Participates in Therapeutic [place] to Support Successful Transition  Outcome: Ongoing, Progressing  Intervention: Foster Therapeutic [place]  Flowsheets (Taken 9/18/2024  0922 by Farhat Woodall, RN)  Trust Relationship/Rapport:   care explained   choices provided   emotional support provided   empathic listening provided   questions answered   questions encouraged   reassurance provided   thoughts/feelings acknowledged  Intervention: Mutually Develop Transition Plan  Flowsheets  Taken 9/18/2024 1107  Outpatient/Agency/Support Group Needs: residential services  Transition Support:   community resources reviewed   crisis management plan promoted   crisis management plan verbalized   follow-up care coordinated   follow-up care discussed  Anticipated Discharge Disposition: residential substance use unit  Taken 9/18/2024 1105  Discharge Coordination/Progress: Patient has [facility] for discharge planning and [facility]  Concerns Comments: NA  Transportation Anticipated: agency  Transportation Concerns: no car  Current Discharge Risk:   substance use/abuse   psychiatric illness  Concerns to be Addressed:   coping/stress   substance/tobacco abuse/use   suicidal   mental health   discharge planning   legal  Readmission Within the Last 30 Days: no previous admission in last 30 days  Patient/Family Anticipated Services at Transition: rehabilitation services  Patient's Choice of Community Agency(s): [facility]  Patient/Family Anticipates Transition to: inpatient rehabilitation facility  Offered/Gave Vendor List: no   Goal Outcome Evaluation:  Plan of Care Reviewed With: patient  Patient Agreement with Plan of Care: agrees  Consent Given to Review Plan with: Grandmother Bella  Progress: no change  Outcome Evaluation: New admit. Completed social history and integrated summary

## 2024-09-18 NOTE — PLAN OF CARE
Problem: Adult Behavioral Health Plan of Care  Goal: Plan of Care Review  Outcome: Ongoing, Progressing  Flowsheets  Taken 9/18/2024 1227 by Farhat Woodall, RN  Progress: improving  Outcome Evaluation: PATIENT VERBALIZES RIGHT ANKLE PAIN, SEVERE ANXIETY AND DEPRESSION AS ONLY PROBLEMS THIS SHIFT. PATIENT FREQUENTLY SEEKS OUT STAFF WITH SOMATIC COMPLAINTS. PATIENT IS AOX3, COOPERATIVE WITH NO S/S OF ACUTE DISTRESS NOTED. NEW ORDERS: LANTUS 22 UNITS, TRAZODONE 50MG, LAMIICTAL 25MG, LATUDA 2O MG, METFORMIN 500MG  Taken 9/18/2024 1107 by Ama Verdugo  Plan of Care Reviewed With: patient  Patient Agreement with Plan of Care: agrees  Taken 9/18/2024 0922 by Farhat Woodall, RN  Plan of Care Reviewed With: patient  Patient Agreement with Plan of Care: agrees   Goal Outcome Evaluation:  Plan of Care Reviewed With: patient  Patient Agreement with Plan of Care: agrees     Progress: improving  Outcome Evaluation: PATIENT VERBALIZES RIGHT ANKLE PAIN, SEVERE ANXIETY AND DEPRESSION AS ONLY PROBLEMS THIS SHIFT. PATIENT FREQUENTLY SEEKS OUT STAFF WITH SOMATIC COMPLAINTS. PATIENT IS AOX3, COOPERATIVE WITH NO S/S OF ACUTE DISTRESS NOTED. NEW ORDERS: LANTUS 22 UNITS, TRAZODONE 50MG, LAMIICTAL 25MG, LATUDA 2O MG, METFORMIN 500MG

## 2024-09-18 NOTE — PROGRESS NOTES
Navigator is helping with the following referral:    [facility] - [phone]  -Left message. 9/18  -Spoke with Kisha. They can not accept patient back. Kisha states patient has been inappropriate a couple different times and they can just not provide the care he needs.  9/18    [facility] - [phone]  -Admissions not in the office. Staff states they will have someone return call. 9/18  -Admissions still not in the office. 9/18

## 2024-09-19 ENCOUNTER — APPOINTMENT (OUTPATIENT)
Dept: GENERAL RADIOLOGY | Facility: HOSPITAL | Age: 19
End: 2024-09-19
Payer: COMMERCIAL

## 2024-09-19 LAB
GLUCOSE BLDC GLUCOMTR-MCNC: 238 MG/DL (ref 70–130)
GLUCOSE BLDC GLUCOMTR-MCNC: 241 MG/DL (ref 70–130)
GLUCOSE BLDC GLUCOMTR-MCNC: 270 MG/DL (ref 70–130)
GLUCOSE BLDC GLUCOMTR-MCNC: 336 MG/DL (ref 70–130)

## 2024-09-19 PROCEDURE — 73610 X-RAY EXAM OF ANKLE: CPT

## 2024-09-19 PROCEDURE — 63710000001 INSULIN GLARGINE PER 5 UNITS: Performed by: PSYCHIATRY & NEUROLOGY

## 2024-09-19 PROCEDURE — 63710000001 ONDANSETRON ODT 4 MG TABLET DISPERSIBLE: Performed by: PSYCHIATRY & NEUROLOGY

## 2024-09-19 PROCEDURE — 63710000001 INSULIN LISPRO (HUMAN) PER 5 UNITS: Performed by: PSYCHIATRY & NEUROLOGY

## 2024-09-19 PROCEDURE — 99232 SBSQ HOSP IP/OBS MODERATE 35: CPT | Performed by: PSYCHIATRY & NEUROLOGY

## 2024-09-19 PROCEDURE — 82948 REAGENT STRIP/BLOOD GLUCOSE: CPT

## 2024-09-19 PROCEDURE — 73610 X-RAY EXAM OF ANKLE: CPT | Performed by: RADIOLOGY

## 2024-09-19 RX ORDER — IBUPROFEN 400 MG/1
600 TABLET, FILM COATED ORAL EVERY 6 HOURS PRN
Status: DISCONTINUED | OUTPATIENT
Start: 2024-09-19 | End: 2024-09-23 | Stop reason: HOSPADM

## 2024-09-19 RX ADMIN — INSULIN GLARGINE 22 UNITS: 100 INJECTION, SOLUTION SUBCUTANEOUS at 10:01

## 2024-09-19 RX ADMIN — ONDANSETRON 4 MG: 4 TABLET, ORALLY DISINTEGRATING ORAL at 07:25

## 2024-09-19 RX ADMIN — INSULIN LISPRO 6 UNITS: 100 INJECTION, SOLUTION INTRAVENOUS; SUBCUTANEOUS at 07:33

## 2024-09-19 RX ADMIN — METFORMIN HYDROCHLORIDE 500 MG: 500 TABLET ORAL at 08:08

## 2024-09-19 RX ADMIN — LAMOTRIGINE 25 MG: 100 TABLET ORAL at 08:07

## 2024-09-19 RX ADMIN — ACETAMINOPHEN 650 MG: 325 TABLET ORAL at 21:29

## 2024-09-19 RX ADMIN — HYDROXYZINE HYDROCHLORIDE 50 MG: 50 TABLET, FILM COATED ORAL at 16:12

## 2024-09-19 RX ADMIN — INSULIN GLARGINE 30 UNITS: 100 INJECTION, SOLUTION SUBCUTANEOUS at 21:29

## 2024-09-19 RX ADMIN — INSULIN LISPRO 7 UNITS: 100 INJECTION, SOLUTION INTRAVENOUS; SUBCUTANEOUS at 21:29

## 2024-09-19 RX ADMIN — INSULIN LISPRO 4 UNITS: 100 INJECTION, SOLUTION INTRAVENOUS; SUBCUTANEOUS at 17:13

## 2024-09-19 RX ADMIN — INSULIN LISPRO 4 UNITS: 100 INJECTION, SOLUTION INTRAVENOUS; SUBCUTANEOUS at 12:28

## 2024-09-19 RX ADMIN — LURASIDONE HYDROCHLORIDE 20 MG: 40 TABLET, FILM COATED ORAL at 17:13

## 2024-09-19 RX ADMIN — ACETAMINOPHEN 650 MG: 325 TABLET ORAL at 16:11

## 2024-09-19 RX ADMIN — IBUPROFEN 600 MG: 400 TABLET ORAL at 12:26

## 2024-09-19 RX ADMIN — TRAZODONE HYDROCHLORIDE 50 MG: 50 TABLET ORAL at 21:29

## 2024-09-19 RX ADMIN — METFORMIN HYDROCHLORIDE 500 MG: 500 TABLET ORAL at 17:14

## 2024-09-19 RX ADMIN — IBUPROFEN 400 MG: 400 TABLET, FILM COATED ORAL at 06:30

## 2024-09-19 NOTE — PROGRESS NOTES
" Inpatient Psych Progress Note     Clinician: Brent Galindo MD  Admission Date: 9/18/2024  08:25 EDT 09/19/24    Behavioral Health Treatment Plan and Problem List: I have reviewed and approved the Behavioral Health Treatment Plan and Problem list.    Allergies  No Known Allergies    Hospital Day: 1 day      Assessment completed within view of staff    History  CC/clinical focus: SI    Interval HPI: Patient seen and evaluated by me.  Chart reviewed.   Patient denies SI overnight.  He thinks that the medication is helping, in addition to group therapy.   Patient rates  level of depression (subjectively) at a   3/10.   Patient tolerating meds okay.  Denies side effects.  No signs of alcohol withdrawal.  He c/o R ankle pain, stating that he got it caught up in a bed frame a few days prior to admission.  Med Compliant.    Review of Systems   Constitutional: Negative.    HENT: Negative.     Eyes: Negative.    Respiratory: Negative.     Cardiovascular: Negative.    Gastrointestinal: Negative.    Endocrine: Negative.    Genitourinary: Negative.    Musculoskeletal: Negative.         R ankle pain   Skin: Negative.    Allergic/Immunologic: Negative.    Neurological: Negative.    Hematological: Negative.    All other systems reviewed and are negative.       /70 (BP Location: Right arm, Patient Position: Sitting)   Pulse 72   Temp 96.9 °F (36.1 °C) (Temporal)   Resp 18   Ht 175.3 cm (69\")   Wt (!) 141 kg (311 lb)   SpO2 96%   BMI 45.93 kg/m²     Mental Status Exam  Mood: depressed  Affect: mood-congruent   Thought Processes: linear  Thought Content: negativistic  Hallucinations: yes  Suicidal Thoughts: denies  Suicidal Plan/Intent: denies  Hopelesness:Mild  Homicidal Thoughts:  denies      Medical Decision Making:   Labs:     Lab Results (last 24 hours)       Procedure Component Value Units Date/Time    POC Glucose Once [[id number]]  (Abnormal) Collected: 09/19/24 0628    Specimen: Blood Updated: 09/19/24 0635     " Glucose 270 mg/dL     POC Glucose Once [[id number]]  (Abnormal) Collected: 09/18/24 2022    Specimen: Blood Updated: 09/18/24 2029     Glucose 320 mg/dL     POC Glucose Once [[id number]]  (Abnormal) Collected: 09/18/24 1645    Specimen: Blood Updated: 09/18/24 1701     Glucose 261 mg/dL     POC Glucose Once [[id number]]  (Abnormal) Collected: 09/18/24 1127    Specimen: Blood Updated: 09/18/24 1136     Glucose 273 mg/dL               Radiology:     Imaging Results (Last 24 Hours)       ** No results found for the last 24 hours. **              EKG:     ECG/EMG Results (most recent)       Procedure Component Value Units Date/Time    ECG 12 Lead Other; Baseline Cardiac Status [[id number]] Collected: 09/18/24 0344     Updated: 09/18/24 1233     QT Interval 408 ms      QTC Interval 443 ms     Narrative:      Test Reason : Other~  Blood Pressure :   */*   mmHG  Vent. Rate :  71 BPM     Atrial Rate :  71 BPM     P-R Int : 152 ms          QRS Dur : 114 ms      QT Int : 408 ms       P-R-T Axes :  27   5  27 degrees     QTc Int : 443 ms    Normal sinus rhythm  Normal ECG  When compared with ECG of 09-SEP-2024 17:44,  No significant change was found  Confirmed by Manny Dennis ([id number]) on 9/18/2024 12:31:14 PM    Referred By:            Confirmed By: Manny Dennis             Medications:  insulin glargine, 22 Units, Subcutaneous, Q12H  insulin lispro, 2-9 Units, Subcutaneous, 4x Daily AC & at Bedtime  lamoTRIgine, 25 mg, Oral, Daily  lurasidone, 20 mg, Oral, Daily With Dinner  metFORMIN, 500 mg, Oral, BID With Meals  traZODone, 50 mg, Oral, Nightly           All medications reviewed.      Assessment and Plan:    Suicidal Ideation  Bipolar II Disorder  We initiated the following medication changes on admission:  Stop Risperdal  Stop zoloft  Stop Trileptal  Initated trial of latuda 20mg daily with dinner  Initiated trial of lamotrigine 25mg Daily  Check TSH     Insomnia  - Trazodone 50mg at bedtime     DM2  - metformin 500mg  twice a day  - Increase Insulin Glargine (Lantus SoloStar) from 22 to 30 units twice a day  - Continue SSI         Continue hospitalization for safety and stabilization.  Continue current level of Special Precautions (q15 minute checks).    I, Brent Galindo MD, I have completed an encounter with the patient today, provided ongoing monitoring and/or adjustments to the assessment and plan described and documented above, and believe this information to be both accurate and complete as of 9/19/2024

## 2024-09-19 NOTE — PHARMACY PATIENT ASSISTANCE
Pharmacy checked on price of Lurasidone initiated inpatient. Per patient's plan, copay will be $0 for 1 month supply. No other issues identified at this time.    Thank you,    Page Lin, PharmD  09/19/24  12:52 EDT

## 2024-09-19 NOTE — PLAN OF CARE
Problem: Adult Behavioral Health Plan of Care  Goal: Optimized Coping Skills in Response to Life Stressors  Outcome: Ongoing, Progressing  Intervention: Promote Effective Coping Strategies  Flowsheets (Taken 9/19/2024 1005)  Supportive Measures:   counseling provided   active listening utilized      1000:     DATA:    Therapist met with the patient individually. Therapist continues reviewing plan of care and aftercare plan.  The patient was agreeable.    ASSESSMENT:    Patient was seen for follow up of Suicidal Ideation. Bipolar II Disorder      Today, patient was seen 1-1  in the office. Patient calm, cooperative and oriented x 4. Patient observed to have appropriate affect, congruent mood, normal thought content, linear thought processes.  Patient denies SI/HI/AVH. He reports being here and taking medicine, doing therapy is helping.  He denies depression, but reports anxiety at 5/10 because he is unsure of his discharge plan.  He reports [facility] brought his belongings, but he is hoping he can find another rehab to go to.  Patient has began attending groups and learning about CBT/DBT concepts and healthy coping.     Therapist assisted  navigator with the following referral:     [facility] - [phone]; Spoke with João in intake and connected phone to patient for interview this date.      CLINICAL MANEUVERING:    Therapist provided safe, secure environment for patient to share.  Provided reflective listening and psychoeducation.  Assisted patient in processing the above session. Assisted patient in identifying any questions or needs to be addressed today.        Concern was voiced regarding substance use and educated patient on risks associated with use. Patient was advised to abstain from use and educated on community resources that can help with sobriety and recovery.  Referral pending with [facility].     Therapist continues to staff with Dr. Brent Galindo, RN staff, and the patient.  No major issues identified.     Plan:     Patient to remain hospitalized this date.      Treatment team will focus efforts on stabilizing patient's acute symptoms while providing education on healthy coping and crisis management to reduce hospitalizations.   Patient requires daily psychiatrist evaluation and 24/7 nursing supervision to promote patient  safety.     Therapist will offer 1-4 individual sessions, family education, and appropriate referral.     Therapist recommends continued assessment.  Referral pending with [facility] this date.

## 2024-09-19 NOTE — PROGRESS NOTES
1119:     Patient completed phone interview with João at [facility]. Patient reports that he has accepted a bed at discharge and that staff can call their agency for transport.  No other issues identified this date.

## 2024-09-19 NOTE — NURSING NOTE
Education on the proper technique for elevating the foot to reduce swelling and promote healing. Explained to the patient that elevating the foot helps reduce swelling promotes healing. Patient demonstrated understanding of the importance of foot elevation.  Patient verbalized understanding to request as needed Motrin for pain.

## 2024-09-20 LAB
GLUCOSE BLDC GLUCOMTR-MCNC: 268 MG/DL (ref 70–130)
GLUCOSE BLDC GLUCOMTR-MCNC: 282 MG/DL (ref 70–130)
GLUCOSE BLDC GLUCOMTR-MCNC: 351 MG/DL (ref 70–130)
GLUCOSE BLDC GLUCOMTR-MCNC: 379 MG/DL (ref 70–130)
TSH SERPL DL<=0.05 MIU/L-ACNC: 1.47 UIU/ML (ref 0.27–4.2)

## 2024-09-20 PROCEDURE — 63710000001 INSULIN GLARGINE PER 5 UNITS: Performed by: PSYCHIATRY & NEUROLOGY

## 2024-09-20 PROCEDURE — 63710000001 INSULIN LISPRO (HUMAN) PER 5 UNITS: Performed by: PSYCHIATRY & NEUROLOGY

## 2024-09-20 PROCEDURE — 99232 SBSQ HOSP IP/OBS MODERATE 35: CPT | Performed by: PSYCHIATRY & NEUROLOGY

## 2024-09-20 PROCEDURE — 84443 ASSAY THYROID STIM HORMONE: CPT | Performed by: PSYCHIATRY & NEUROLOGY

## 2024-09-20 PROCEDURE — 82948 REAGENT STRIP/BLOOD GLUCOSE: CPT

## 2024-09-20 RX ADMIN — INSULIN LISPRO 6 UNITS: 100 INJECTION, SOLUTION INTRAVENOUS; SUBCUTANEOUS at 10:57

## 2024-09-20 RX ADMIN — METFORMIN HYDROCHLORIDE 500 MG: 500 TABLET ORAL at 17:08

## 2024-09-20 RX ADMIN — INSULIN GLARGINE 30 UNITS: 100 INJECTION, SOLUTION SUBCUTANEOUS at 08:18

## 2024-09-20 RX ADMIN — LURASIDONE HYDROCHLORIDE 20 MG: 40 TABLET, FILM COATED ORAL at 17:08

## 2024-09-20 RX ADMIN — INSULIN GLARGINE 30 UNITS: 100 INJECTION, SOLUTION SUBCUTANEOUS at 20:49

## 2024-09-20 RX ADMIN — TRAZODONE HYDROCHLORIDE 50 MG: 50 TABLET ORAL at 20:49

## 2024-09-20 RX ADMIN — INSULIN LISPRO 8 UNITS: 100 INJECTION, SOLUTION INTRAVENOUS; SUBCUTANEOUS at 16:55

## 2024-09-20 RX ADMIN — HYDROXYZINE HYDROCHLORIDE 50 MG: 50 TABLET, FILM COATED ORAL at 20:50

## 2024-09-20 RX ADMIN — LAMOTRIGINE 25 MG: 100 TABLET ORAL at 08:02

## 2024-09-20 RX ADMIN — INSULIN LISPRO 8 UNITS: 100 INJECTION, SOLUTION INTRAVENOUS; SUBCUTANEOUS at 20:49

## 2024-09-20 RX ADMIN — ACETAMINOPHEN 650 MG: 325 TABLET ORAL at 07:48

## 2024-09-20 RX ADMIN — METFORMIN HYDROCHLORIDE 500 MG: 500 TABLET ORAL at 07:48

## 2024-09-20 RX ADMIN — INSULIN LISPRO 6 UNITS: 100 INJECTION, SOLUTION INTRAVENOUS; SUBCUTANEOUS at 07:30

## 2024-09-20 NOTE — PLAN OF CARE
Goal Outcome Evaluation:  Plan of Care Reviewed With: patient  Patient Agreement with Plan of Care: agrees        Outcome Evaluation: Pt reports good appetite and sleep. A/D 8. Denies SI/HI/AVH

## 2024-09-20 NOTE — PROGRESS NOTES
" Inpatient Psych Progress Note     Clinician: Brent Galindo MD  Admission Date: 9/18/2024  08:26 EDT 09/20/24    Behavioral Health Treatment Plan and Problem List: I have reviewed and approved the Behavioral Health Treatment Plan and Problem list.    Allergies  No Known Allergies    Hospital Day: 2 days      Assessment completed within view of staff    History  CC/clinical focus: SI    Interval HPI: Patient seen and evaluated by me.  Chart reviewed.   Med Compliant.Patient tolerating the medication changes well.  Denies side effects.     Patient rates  level of depression (subjectively) at a   8/10.  Anxiety  8 /10.  Patient tolerating meds okay.  Denies side effects.  Slept okay.  He reports his right ankle pain has resolved.  Xray negative.    Review of Systems   Constitutional: Negative.    HENT: Negative.     Eyes: Negative.    Respiratory: Negative.     Cardiovascular: Negative.    Gastrointestinal: Negative.    Endocrine: Negative.    Genitourinary: Negative.    Musculoskeletal: Negative.    Skin: Negative.    Allergic/Immunologic: Negative.    Neurological: Negative.    Hematological: Negative.    All other systems reviewed and are negative.       /66 (BP Location: Right arm, Patient Position: Sitting)   Pulse 76   Temp 98.9 °F (37.2 °C) (Temporal)   Resp 16   Ht 175.3 cm (69\")   Wt (!) 141 kg (311 lb)   SpO2 97%   BMI 45.93 kg/m²     Mental Status Exam  Mood: anxious  Affect: mood-congruent   Thought Processes: linear  Thought Content: normal  Hallucinations: no  Suicidal Thoughts: denies  Suicidal Plan/Intent: denies  Hopelesness:Mild  Homicidal Thoughts:  denies      Medical Decision Making:   Labs:     Lab Results (last 24 hours)       Procedure Component Value Units Date/Time    TSH [[id number]]  (Normal) Collected: 09/20/24 0531    Specimen: Blood Updated: 09/20/24 0647     TSH 1.470 uIU/mL     POC Glucose Once [[id number]]  (Abnormal) Collected: 09/20/24 0629    Specimen: Blood Updated: " 09/20/24 0636     Glucose 268 mg/dL     POC Glucose Once [[id number]]  (Abnormal) Collected: 09/19/24 2126    Specimen: Blood Updated: 09/19/24 2132     Glucose 336 mg/dL     POC Glucose Once [[id number]]  (Abnormal) Collected: 09/19/24 1629    Specimen: Blood Updated: 09/19/24 1637     Glucose 241 mg/dL     POC Glucose Once [[id number]]  (Abnormal) Collected: 09/19/24 1201    Specimen: Blood Updated: 09/19/24 1209     Glucose 238 mg/dL               Radiology:     Imaging Results (Last 24 Hours)       Procedure Component Value Units Date/Time    XR Ankle 3+ View Right [[id number]] Collected: 09/19/24 1027     Updated: 09/19/24 1029    Narrative:      EXAM:    XR Right Ankle Complete, 3 or More Views     EXAM DATE:    9/19/2024 9:18 AM     CLINICAL HISTORY:    pain     TECHNIQUE:    Frontal, lateral and oblique views of the right ankle.     COMPARISON:    No relevant prior studies available.     FINDINGS:    BONES/JOINTS:  Unremarkable as visualized.  No acute fracture.  No  dislocation.    SOFT TISSUES:  Unremarkable as visualized.       Impression:        No acute findings in the right ankle.        This report was finalized on 9/19/2024 10:27 AM by Dr. Colin Crockett MD.                 EKG:     ECG/EMG Results (most recent)       Procedure Component Value Units Date/Time    ECG 12 Lead Other; Baseline Cardiac Status [[id number]] Collected: 09/18/24 0344     Updated: 09/18/24 1233     QT Interval 408 ms      QTC Interval 443 ms     Narrative:      Test Reason : Other~  Blood Pressure :   */*   mmHG  Vent. Rate :  71 BPM     Atrial Rate :  71 BPM     P-R Int : 152 ms          QRS Dur : 114 ms      QT Int : 408 ms       P-R-T Axes :  27   5  27 degrees     QTc Int : 443 ms    Normal sinus rhythm  Normal ECG  When compared with ECG of 09-SEP-2024 17:44,  No significant change was found  Confirmed by Manny Dennis ([id number]) on 9/18/2024 12:31:14 PM    Referred By:            Confirmed By: Manny Dennis              Medications:  insulin glargine, 30 Units, Subcutaneous, Q12H  insulin lispro, 2-9 Units, Subcutaneous, 4x Daily AC & at Bedtime  lamoTRIgine, 25 mg, Oral, Daily  lurasidone, 20 mg, Oral, Daily With Dinner  metFORMIN, 500 mg, Oral, BID With Meals  traZODone, 50 mg, Oral, Nightly           All medications reviewed.      Assessment and Plan:     Suicidal Ideation  Bipolar II Disorder  We initiated the following medication changes on admission:  Stop Risperdal  Stop zoloft  Stop Trileptal  Initated trial of latuda 20mg daily with dinner  Initiated trial of lamotrigine 25mg Daily  TSH reviewed - WNL  Patient showing signs of improvement with these medication changes.  Will monitor for stability through the weekend with plan to discharge on Monday if all goes well.     Insomnia  - Trazodone 50mg at bedtime     DM2  - metformin 500mg twice a day  - Increase Insulin Glargine (Lantus SoloStar) from 22 to 30 units twice a day  - Continue SSI      Continue hospitalization for safety and stabilization.  Continue current level of Special Precautions (q15 minute checks).    I, Brent Galindo MD, I have completed an encounter with the patient today, provided ongoing monitoring and/or adjustments to the assessment and plan described and documented above, and believe this information to be both accurate and complete as of 9/20/2024

## 2024-09-21 LAB
GLUCOSE BLDC GLUCOMTR-MCNC: 289 MG/DL (ref 70–130)
GLUCOSE BLDC GLUCOMTR-MCNC: 300 MG/DL (ref 70–130)
GLUCOSE BLDC GLUCOMTR-MCNC: 375 MG/DL (ref 70–130)
GLUCOSE BLDC GLUCOMTR-MCNC: 394 MG/DL (ref 70–130)

## 2024-09-21 PROCEDURE — 63710000001 INSULIN GLARGINE PER 5 UNITS: Performed by: PSYCHIATRY & NEUROLOGY

## 2024-09-21 PROCEDURE — 63710000001 INSULIN LISPRO (HUMAN) PER 5 UNITS: Performed by: PSYCHIATRY & NEUROLOGY

## 2024-09-21 PROCEDURE — 82948 REAGENT STRIP/BLOOD GLUCOSE: CPT

## 2024-09-21 PROCEDURE — 99232 SBSQ HOSP IP/OBS MODERATE 35: CPT | Performed by: PSYCHIATRY & NEUROLOGY

## 2024-09-21 RX ORDER — BACITRACIN ZINC, NEOMYCIN SULFATE AND POLYMYXIN B SULFATE 400; 5; 5000 [IU]/G; MG/G; [IU]/G
1 OINTMENT TOPICAL 2 TIMES DAILY
Status: DISCONTINUED | OUTPATIENT
Start: 2024-09-21 | End: 2024-09-23 | Stop reason: HOSPADM

## 2024-09-21 RX ADMIN — INSULIN LISPRO 8 UNITS: 100 INJECTION, SOLUTION INTRAVENOUS; SUBCUTANEOUS at 16:35

## 2024-09-21 RX ADMIN — METFORMIN HYDROCHLORIDE 500 MG: 500 TABLET ORAL at 18:08

## 2024-09-21 RX ADMIN — LURASIDONE HYDROCHLORIDE 20 MG: 40 TABLET, FILM COATED ORAL at 18:08

## 2024-09-21 RX ADMIN — BACITRACIN ZINC, NEOMYCIN, POLYMYXIN B 1 APPLICATION: 400; 3.5; 5 OINTMENT TOPICAL at 21:08

## 2024-09-21 RX ADMIN — ACETAMINOPHEN 650 MG: 325 TABLET ORAL at 16:06

## 2024-09-21 RX ADMIN — METFORMIN HYDROCHLORIDE 500 MG: 500 TABLET ORAL at 08:30

## 2024-09-21 RX ADMIN — IBUPROFEN 600 MG: 400 TABLET ORAL at 07:23

## 2024-09-21 RX ADMIN — INSULIN LISPRO 7 UNITS: 100 INJECTION, SOLUTION INTRAVENOUS; SUBCUTANEOUS at 11:44

## 2024-09-21 RX ADMIN — INSULIN GLARGINE 30 UNITS: 100 INJECTION, SOLUTION SUBCUTANEOUS at 20:56

## 2024-09-21 RX ADMIN — TRAZODONE HYDROCHLORIDE 50 MG: 50 TABLET ORAL at 20:56

## 2024-09-21 RX ADMIN — LAMOTRIGINE 25 MG: 100 TABLET ORAL at 08:30

## 2024-09-21 RX ADMIN — INSULIN GLARGINE 30 UNITS: 100 INJECTION, SOLUTION SUBCUTANEOUS at 08:30

## 2024-09-21 RX ADMIN — INSULIN LISPRO 6 UNITS: 100 INJECTION, SOLUTION INTRAVENOUS; SUBCUTANEOUS at 07:20

## 2024-09-21 RX ADMIN — INSULIN LISPRO 8 UNITS: 100 INJECTION, SOLUTION INTRAVENOUS; SUBCUTANEOUS at 20:56

## 2024-09-21 NOTE — PROGRESS NOTES
" Inpatient Psych Progress Note     Clinician: Lito Silver MD  Admission Date: 9/18/2024  08:26 EDT 09/21/24    Behavioral Health Treatment Plan and Problem List: I have reviewed and approved the Behavioral Health Treatment Plan and Problem list.    Allergies  No Known Allergies    Hospital Day: 3 days      Assessment completed within view of staff    History  CC/clinical focus: SI    Interval HPI: Patient seen and evaluated today and I reviewed his chart.  He reports that he feels good today and is requesting discharge.  I advised that he has just gotten here and it would be better to ensure that the medication is working and that he has sustained improvement.  Patient was amenable to this.  He denies any medication side effects.  He reports good sleep and appetite and denies SI/HI/AVH.    Review of Systems   Constitutional: Negative.    HENT: Negative.     Eyes: Negative.    Respiratory: Negative.     Cardiovascular: Negative.    Gastrointestinal: Negative.    Endocrine: Negative.    Genitourinary: Negative.    Musculoskeletal: Negative.    Skin: Negative.    Allergic/Immunologic: Negative.    Neurological: Negative.    Hematological: Negative.    All other systems reviewed and are negative.       /62 (BP Location: Right arm, Patient Position: Sitting)   Pulse 74   Temp 96.9 °F (36.1 °C) (Temporal)   Resp 16   Ht 175.3 cm (69\")   Wt (!) 141 kg (311 lb)   SpO2 96%   BMI 45.93 kg/m²     Mental Status Exam  Mood: normal  Affect: mood-congruent   Thought Processes: linear  Thought Content: normal  Hallucinations: no  Suicidal Thoughts: denies  Suicidal Plan/Intent: denies  Hopelesness:Mild  Homicidal Thoughts:  denies      Medical Decision Making:   Labs:     Lab Results (last 24 hours)       Procedure Component Value Units Date/Time    POC Glucose Once [[id number]]  (Abnormal) Collected: 09/21/24 1132    Specimen: Blood Updated: 09/21/24 1139     Glucose 300 mg/dL     POC Glucose Once [[id number]]  " (Abnormal) Collected: 09/21/24 0613    Specimen: Blood Updated: 09/21/24 0621     Glucose 289 mg/dL     POC Glucose Once [[id number]]  (Abnormal) Collected: 09/20/24 1931    Specimen: Blood Updated: 09/20/24 1938     Glucose 351 mg/dL     POC Glucose Once [[id number]]  (Abnormal) Collected: 09/20/24 1647    Specimen: Blood Updated: 09/20/24 1653     Glucose 379 mg/dL               Radiology:     Imaging Results (Last 24 Hours)       ** No results found for the last 24 hours. **              EKG:     ECG/EMG Results (most recent)       Procedure Component Value Units Date/Time    ECG 12 Lead Other; Baseline Cardiac Status [[id number]] Collected: 09/18/24 0344     Updated: 09/18/24 1233     QT Interval 408 ms      QTC Interval 443 ms     Narrative:      Test Reason : Other~  Blood Pressure :   */*   mmHG  Vent. Rate :  71 BPM     Atrial Rate :  71 BPM     P-R Int : 152 ms          QRS Dur : 114 ms      QT Int : 408 ms       P-R-T Axes :  27   5  27 degrees     QTc Int : 443 ms    Normal sinus rhythm  Normal ECG  When compared with ECG of 09-SEP-2024 17:44,  No significant change was found  Confirmed by Manny Dennis ([id number]) on 9/18/2024 12:31:14 PM    Referred By:            Confirmed By: Manny Dennis             Medications:  insulin glargine, 30 Units, Subcutaneous, Q12H  insulin lispro, 2-9 Units, Subcutaneous, 4x Daily AC & at Bedtime  lamoTRIgine, 25 mg, Oral, Daily  lurasidone, 20 mg, Oral, Daily With Dinner  metFORMIN, 500 mg, Oral, BID With Meals  traZODone, 50 mg, Oral, Nightly           All medications reviewed.      Assessment and Plan:     Suicidal Ideation  Bipolar II Disorder  We initiated the following medication changes on admission:  Stop Risperdal  Stop zoloft  Stop Trileptal  Initated trial of latuda 20mg daily with dinner  Initiated trial of lamotrigine 25mg Daily  TSH reviewed - WNL  Patient showing signs of improvement with these medication changes.  Will monitor for stability through the  weekend with plan to discharge on Monday if all goes well.     Insomnia  - Trazodone 50mg at bedtime     DM2  - metformin 500mg twice a day  - Increased Insulin Glargine (Lantus SoloStar) from 22 to 30 units twice a day  - Continue SSI      Continue hospitalization for safety and stabilization.  Continue current level of Special Precautions (q15 minute checks).

## 2024-09-21 NOTE — PLAN OF CARE
Goal Outcome Evaluation:  Plan of Care Reviewed With: patient  Patient Agreement with Plan of Care: agrees     Progress: improving  Outcome Evaluation: Patient rates both anxiety and depression 0/10. Patient denies any SI/HI/AVH. Patient spent majority of shift in dayroom and interacted well with peers. Patient had no complaints this shift.

## 2024-09-21 NOTE — PLAN OF CARE
Goal Outcome Evaluation:  Plan of Care Reviewed With: patient  Patient Agreement with Plan of Care: agrees        Outcome Evaluation: Pt reports good appetite and sleep. Denies A/D, SI/HI/AVH.

## 2024-09-22 LAB
GLUCOSE BLDC GLUCOMTR-MCNC: 275 MG/DL (ref 70–130)
GLUCOSE BLDC GLUCOMTR-MCNC: 284 MG/DL (ref 70–130)
GLUCOSE BLDC GLUCOMTR-MCNC: 314 MG/DL (ref 70–130)
GLUCOSE BLDC GLUCOMTR-MCNC: 434 MG/DL (ref 70–130)

## 2024-09-22 PROCEDURE — 82948 REAGENT STRIP/BLOOD GLUCOSE: CPT

## 2024-09-22 PROCEDURE — 63710000001 INSULIN GLARGINE PER 5 UNITS: Performed by: PSYCHIATRY & NEUROLOGY

## 2024-09-22 PROCEDURE — 99233 SBSQ HOSP IP/OBS HIGH 50: CPT | Performed by: PSYCHIATRY & NEUROLOGY

## 2024-09-22 PROCEDURE — 63710000001 INSULIN LISPRO (HUMAN) PER 5 UNITS: Performed by: PSYCHIATRY & NEUROLOGY

## 2024-09-22 RX ADMIN — TRAZODONE HYDROCHLORIDE 50 MG: 50 TABLET ORAL at 20:48

## 2024-09-22 RX ADMIN — INSULIN LISPRO 9 UNITS: 100 INJECTION, SOLUTION INTRAVENOUS; SUBCUTANEOUS at 11:18

## 2024-09-22 RX ADMIN — LAMOTRIGINE 25 MG: 100 TABLET ORAL at 08:36

## 2024-09-22 RX ADMIN — INSULIN LISPRO 6 UNITS: 100 INJECTION, SOLUTION INTRAVENOUS; SUBCUTANEOUS at 07:56

## 2024-09-22 RX ADMIN — METFORMIN HYDROCHLORIDE 500 MG: 500 TABLET ORAL at 17:03

## 2024-09-22 RX ADMIN — INSULIN GLARGINE 30 UNITS: 100 INJECTION, SOLUTION SUBCUTANEOUS at 20:48

## 2024-09-22 RX ADMIN — IBUPROFEN 600 MG: 400 TABLET ORAL at 06:14

## 2024-09-22 RX ADMIN — INSULIN GLARGINE 30 UNITS: 100 INJECTION, SOLUTION SUBCUTANEOUS at 08:37

## 2024-09-22 RX ADMIN — BACITRACIN ZINC, NEOMYCIN, POLYMYXIN B 1 APPLICATION: 400; 3.5; 5 OINTMENT TOPICAL at 20:48

## 2024-09-22 RX ADMIN — METFORMIN HYDROCHLORIDE 500 MG: 500 TABLET ORAL at 08:36

## 2024-09-22 RX ADMIN — INSULIN LISPRO 7 UNITS: 100 INJECTION, SOLUTION INTRAVENOUS; SUBCUTANEOUS at 20:48

## 2024-09-22 RX ADMIN — HYDROXYZINE HYDROCHLORIDE 50 MG: 50 TABLET, FILM COATED ORAL at 16:20

## 2024-09-22 RX ADMIN — LURASIDONE HYDROCHLORIDE 20 MG: 40 TABLET, FILM COATED ORAL at 17:03

## 2024-09-22 RX ADMIN — INSULIN LISPRO 6 UNITS: 100 INJECTION, SOLUTION INTRAVENOUS; SUBCUTANEOUS at 16:52

## 2024-09-22 NOTE — NURSING NOTE
RAMIRO BLAIR BGL . DR. GODOY IS ON THE FLOOR AND HE WAS MADE AWARE. SECOND CHECK OF BGL , DR. GODOY IS AWARE. PATIENT RECEIVED 9 UNITS OF INSULIN FOR CORRECTION.

## 2024-09-22 NOTE — PLAN OF CARE
Goal Outcome Evaluation:  Plan of Care Reviewed With: patient  Patient Agreement with Plan of Care: agrees        Outcome Evaluation: Pt reports good appetite and sleep. Denies A/D, SI/HI/AVH

## 2024-09-22 NOTE — PLAN OF CARE
Problem: Adult Behavioral Health Plan of Care  Goal: Plan of Care Review  Outcome: Ongoing, Progressing  Flowsheets  Taken 9/22/2024 1358 by Farhat Woodall, RN  Outcome Evaluation: PATIENT DENIES ANY PROBLEMS THIS SHIFT. PATIENT IS AOX3, COOPERATIVE BUT LABILE, WITH NO S/S OF ACUTE DISTRESS NOTED. PATIENT MAY POSSIBLY D/C ON MONDAY THE 23RD.  Taken 9/22/2024 0913 by Farhat Woodall, RN  Plan of Care Reviewed With: patient  Patient Agreement with Plan of Care: agrees  Taken 9/21/2024 1736 by Nikki Hillman, RN  Progress: improving   Goal Outcome Evaluation:  Plan of Care Reviewed With: patient  Patient Agreement with Plan of Care: agrees     Progress: improving  Outcome Evaluation: PATIENT DENIES ANY PROBLEMS THIS SHIFT. PATIENT IS AOX3, COOPERATIVE BUT LABILE, WITH NO S/S OF ACUTE DISTRESS NOTED. PATIENT MAY POSSIBLY D/C ON MONDAY THE 23RD.

## 2024-09-22 NOTE — PROGRESS NOTES
" Inpatient Psych Progress Note     Clinician: Lito Silver MD  Admission Date: 9/18/2024  08:26 EDT 09/22/24    Behavioral Health Treatment Plan and Problem List: I have reviewed and approved the Behavioral Health Treatment Plan and Problem list.    Allergies  No Known Allergies    Hospital Day: 4 days      Assessment completed within view of staff    History  CC/clinical focus: SI    Interval HPI: Patient seen and evaluated today.  Yesterday, he complained of a small bite or lesion on his inner right thigh.  It was demarcated by nursing staff to monitor for erythema spread and I placed him on a triple antibiotic ointment.  Today, it looks marginally improved.  The erythema has largely resolved and the scab seems to be healing appropriately.  He reports his mood is improved and he denies any complaints today about depression or anxiety and is requesting discharge.  I feel that if he remains appropriate today, he is likely appropriate for discharge tomorrow.    Review of Systems   Constitutional: Negative.    HENT: Negative.     Eyes: Negative.    Respiratory: Negative.     Cardiovascular: Negative.    Gastrointestinal: Negative.    Endocrine: Negative.    Genitourinary: Negative.    Musculoskeletal: Negative.    Skin: Negative.    Allergic/Immunologic: Negative.    Neurological: Negative.    Hematological: Negative.    All other systems reviewed and are negative.       /74 (BP Location: Right arm, Patient Position: Sitting)   Pulse 77   Temp 96.9 °F (36.1 °C) (Temporal)   Resp 18   Ht 175.3 cm (69\")   Wt (!) 141 kg (311 lb)   SpO2 96%   BMI 45.93 kg/m²     Mental Status Exam  Mood: normal  Affect: mood-congruent   Thought Processes: linear  Thought Content: normal  Hallucinations: no  Suicidal Thoughts: denies  Suicidal Plan/Intent: denies  Hopelesness:Optimistic  Homicidal Thoughts:  denies      Medical Decision Making:   Labs:     Lab Results (last 24 hours)       Procedure Component Value Units " Date/Time    POC Glucose Once [[id number]]  (Abnormal) Collected: 09/22/24 1114    Specimen: Blood Updated: 09/22/24 1122     Glucose 434 mg/dL     POC Glucose Once [[id number]]  (Abnormal) Collected: 09/22/24 0617    Specimen: Blood Updated: 09/22/24 0623     Glucose 275 mg/dL     POC Glucose Once [[id number]]  (Abnormal) Collected: 09/21/24 1920    Specimen: Blood Updated: 09/21/24 1927     Glucose 375 mg/dL     POC Glucose Once [[id number]]  (Abnormal) Collected: 09/21/24 1610    Specimen: Blood Updated: 09/21/24 1618     Glucose 394 mg/dL               Radiology:     Imaging Results (Last 24 Hours)       ** No results found for the last 24 hours. **              EKG:     ECG/EMG Results (most recent)       Procedure Component Value Units Date/Time    ECG 12 Lead Other; Baseline Cardiac Status [[id number]] Collected: 09/18/24 0344     Updated: 09/18/24 1233     QT Interval 408 ms      QTC Interval 443 ms     Narrative:      Test Reason : Other~  Blood Pressure :   */*   mmHG  Vent. Rate :  71 BPM     Atrial Rate :  71 BPM     P-R Int : 152 ms          QRS Dur : 114 ms      QT Int : 408 ms       P-R-T Axes :  27   5  27 degrees     QTc Int : 443 ms    Normal sinus rhythm  Normal ECG  When compared with ECG of 09-SEP-2024 17:44,  No significant change was found  Confirmed by Manny Dennis ([id number]) on 9/18/2024 12:31:14 PM    Referred By:            Confirmed By: Manny Dennis             Medications:  insulin glargine, 30 Units, Subcutaneous, Q12H  insulin lispro, 2-9 Units, Subcutaneous, 4x Daily AC & at Bedtime  lamoTRIgine, 25 mg, Oral, Daily  lurasidone, 20 mg, Oral, Daily With Dinner  metFORMIN, 500 mg, Oral, BID With Meals  traZODone, 50 mg, Oral, Nightly  Triple Antibiotic, 1 Application, Apply externally, BID           All medications reviewed.      Assessment and Plan:     Suicidal Ideation  Bipolar II Disorder  We initiated the following medication changes on admission:  Stop Risperdal  Stop zoloft  Stop  Trileptal  Initated trial of latuda 20mg daily with dinner  Initiated trial of lamotrigine 25mg Daily  TSH reviewed - WNL  Patient showing signs of improvement with these medication changes.  Patient doing very well and no major issues are noted today.  I feel that patient would be appropriate for discharge tomorrow pending clinical course today     Insomnia  - Trazodone 50mg at bedtime     DM2  - metformin 500mg twice a day  - Increased Insulin Glargine (Lantus SoloStar) from 22 to 30 units twice a day  - Continue SSI    Lesion on inner right thigh  -Appears to be a bug bite or infected hair follicle.  He complained of it yesterday and it was demarcated by nursing staff to monitor spread of erythema.  This morning I evaluated after patient was placed on triple antibiotic ointment and the erythema has all but resolved and the scab is healing appropriately.  -Continue triple antibiotic ointment      Continue hospitalization for safety and stabilization.  Continue current level of Special Precautions (q15 minute checks).

## 2024-09-22 NOTE — NURSING NOTE
Pt noted to have small scabbed area with surrounding redness on right leg, complaining of pain and pruritus. Dr Silver notified and an order for triple antibiotic ointment 1 application BID to affected area was given. [id number]

## 2024-09-23 VITALS
WEIGHT: 311 LBS | RESPIRATION RATE: 18 BRPM | SYSTOLIC BLOOD PRESSURE: 118 MMHG | DIASTOLIC BLOOD PRESSURE: 73 MMHG | TEMPERATURE: 97.8 F | HEIGHT: 69 IN | OXYGEN SATURATION: 97 % | BODY MASS INDEX: 46.06 KG/M2 | HEART RATE: 78 BPM

## 2024-09-23 PROBLEM — F10.20 ALCOHOL USE DISORDER, SEVERE, DEPENDENCE: Status: ACTIVE | Noted: 2024-09-23

## 2024-09-23 PROBLEM — E11.9 DM (DIABETES MELLITUS), TYPE 2: Status: ACTIVE | Noted: 2024-09-23

## 2024-09-23 PROBLEM — F31.81 BIPOLAR II DISORDER: Status: ACTIVE | Noted: 2024-09-23

## 2024-09-23 LAB
GLUCOSE BLDC GLUCOMTR-MCNC: 213 MG/DL (ref 70–130)
GLUCOSE BLDC GLUCOMTR-MCNC: 237 MG/DL (ref 70–130)

## 2024-09-23 PROCEDURE — 99239 HOSP IP/OBS DSCHRG MGMT >30: CPT | Performed by: PSYCHIATRY & NEUROLOGY

## 2024-09-23 PROCEDURE — 63710000001 INSULIN LISPRO (HUMAN) PER 5 UNITS: Performed by: PSYCHIATRY & NEUROLOGY

## 2024-09-23 PROCEDURE — 63710000001 INSULIN GLARGINE PER 5 UNITS: Performed by: PSYCHIATRY & NEUROLOGY

## 2024-09-23 PROCEDURE — 82948 REAGENT STRIP/BLOOD GLUCOSE: CPT

## 2024-09-23 RX ORDER — LURASIDONE HYDROCHLORIDE 20 MG/1
20 TABLET, FILM COATED ORAL
Qty: 30 TABLET | Refills: 0 | Status: SHIPPED | OUTPATIENT
Start: 2024-09-23

## 2024-09-23 RX ORDER — INSULIN GLARGINE 100 [IU]/ML
30 INJECTION, SOLUTION SUBCUTANEOUS 2 TIMES DAILY
Start: 2024-09-23 | End: 2024-09-23

## 2024-09-23 RX ORDER — LAMOTRIGINE 25 MG/1
TABLET ORAL
Qty: 42 TABLET | Refills: 0 | Status: SHIPPED | OUTPATIENT
Start: 2024-09-24 | End: 2024-10-22

## 2024-09-23 RX ORDER — INSULIN GLARGINE 100 [IU]/ML
30 INJECTION, SOLUTION SUBCUTANEOUS 2 TIMES DAILY
Qty: 15 ML | Refills: 0 | Status: ON HOLD | OUTPATIENT
Start: 2024-09-23 | End: 2024-09-30

## 2024-09-23 RX ORDER — TRAZODONE HYDROCHLORIDE 50 MG/1
50 TABLET, FILM COATED ORAL NIGHTLY
Qty: 30 TABLET | Refills: 0 | Status: SHIPPED | OUTPATIENT
Start: 2024-09-23

## 2024-09-23 RX ADMIN — INSULIN GLARGINE 30 UNITS: 100 INJECTION, SOLUTION SUBCUTANEOUS at 08:41

## 2024-09-23 RX ADMIN — LAMOTRIGINE 25 MG: 100 TABLET ORAL at 08:40

## 2024-09-23 RX ADMIN — INSULIN LISPRO 4 UNITS: 100 INJECTION, SOLUTION INTRAVENOUS; SUBCUTANEOUS at 11:19

## 2024-09-23 RX ADMIN — IBUPROFEN 600 MG: 400 TABLET ORAL at 06:31

## 2024-09-23 RX ADMIN — INSULIN LISPRO 4 UNITS: 100 INJECTION, SOLUTION INTRAVENOUS; SUBCUTANEOUS at 07:59

## 2024-09-23 RX ADMIN — METFORMIN HYDROCHLORIDE 500 MG: 500 TABLET ORAL at 08:39

## 2024-09-23 NOTE — DISCHARGE SUMMARY
":  2005  MRN:  [id number]  Visit Number:  [id number]      Date of Admission:2024   Date of Discharge:  2024    Discharge Diagnosis:  Principal Problem:    Suicidal behavior  Active Problems:    Bipolar II disorder    Alcohol use disorder, severe, dependence    DM (diabetes mellitus), type 2        Admission Diagnosis:  Suicidal behavior [R45.89]     RAY Simons is a 19 y.o. male who was admitted on the unit on 2024 and was evaluated on 24. He presented to ER with SI and intent, with thoughts of cutting himself.    For details please see H&P dated 24.     Hospital Course  Patient is a 19 y.o. male presented with suicidal ideations. The patient was at a rehab facility for his history of alcohol use and he was not happy there and felt overwhelmed and suicidal. He was admitted to the adult psych unit for safety, further evaluation and treatment. The patient was diagnosed with bipolar II disorder and started on lamotrigine and lurasidone. He was able to take the medications without any adverse effects and reported they were helping. He was continued on metformin and insulin glargine and dose of insulin was increased from 22 U bid to 30 U bid.   The patient was also able to take part in individual and group counseling sessions and work on appropriate coping skills.  The patient made steady improvement in his mood and expressed feeling more positive and hopeful about future. Sleep and appetite were improved.  The day of discharge the patient was calm, cooperative and pleasant. Mood was reported to be good, and denied SI/HI/AVH. Also reported no medication side effects.        Mental Status Exam upon discharge:   Mood \"good\"   Affect-congruent, appropriate, stable  Thought Content-goal directed, no delusional material present  Thought process-linear, organized.  Suicidality: No SI  Homicidality: No HI  Perception: No AH/VH    Procedures Performed         Consults:   Consults       " No orders found from 8/20/2024 to 9/19/2024.            Pertinent Test Results:   Admission on 09/18/2024   Component Date Value Ref Range Status    Hepatitis B Surface Ag 09/18/2024 Non-Reactive  Non-Reactive Final    Hep A IgM 09/18/2024 Non-Reactive  Non-Reactive Final    Hep B C IgM 09/18/2024 Non-Reactive  Non-Reactive Final    Hepatitis C Ab 09/18/2024 Non-Reactive  Non-Reactive Final    QT Interval 09/18/2024 408  ms Final    QTC Interval 09/18/2024 443  ms Final    Glucose 09/18/2024 208 (H)  70 - 130 mg/dL Final    Glucose 09/18/2024 273 (H)  70 - 130 mg/dL Final    Glucose 09/18/2024 261 (H)  70 - 130 mg/dL Final    Glucose 09/18/2024 320 (H)  70 - 130 mg/dL Final    Glucose 09/19/2024 270 (H)  70 - 130 mg/dL Final    Glucose 09/19/2024 238 (H)  70 - 130 mg/dL Final    Glucose 09/19/2024 241 (H)  70 - 130 mg/dL Final    Glucose 09/19/2024 336 (H)  70 - 130 mg/dL Final    TSH 09/20/2024 1.470  0.270 - 4.200 uIU/mL Final    Glucose 09/20/2024 268 (H)  70 - 130 mg/dL Final    Glucose 09/20/2024 282 (H)  70 - 130 mg/dL Final    Glucose 09/20/2024 379 (H)  70 - 130 mg/dL Final    Glucose 09/20/2024 351 (H)  70 - 130 mg/dL Final    Glucose 09/21/2024 289 (H)  70 - 130 mg/dL Final    Glucose 09/21/2024 300 (H)  70 - 130 mg/dL Final    Glucose 09/21/2024 394 (H)  70 - 130 mg/dL Final    Glucose 09/21/2024 375 (H)  70 - 130 mg/dL Final    Glucose 09/22/2024 275 (H)  70 - 130 mg/dL Final    Glucose 09/22/2024 434 (C)  70 - 130 mg/dL Final    Glucose 09/22/2024 284 (H)  70 - 130 mg/dL Final    Glucose 09/22/2024 314 (H)  70 - 130 mg/dL Final    Glucose 09/23/2024 213 (H)  70 - 130 mg/dL Final   Admission on 09/17/2024, Discharged on 09/18/2024   Component Date Value Ref Range Status    Glucose 09/17/2024 272 (H)  65 - 99 mg/dL Final    BUN 09/17/2024 13  6 - 20 mg/dL Final    Creatinine 09/17/2024 0.66 (L)  0.76 - 1.27 mg/dL Final    Sodium 09/17/2024 137  136 - 145 mmol/L Final    Potassium 09/17/2024 4.0  3.5 - 5.2  mmol/L Final    Slight hemolysis detected by analyzer. Result may be falsely elevated.    Chloride 09/17/2024 106  98 - 107 mmol/L Final    CO2 09/17/2024 21.7 (L)  22.0 - 29.0 mmol/L Final    Calcium 09/17/2024 9.4  8.6 - 10.5 mg/dL Final    Total Protein 09/17/2024 7.0  6.0 - 8.5 g/dL Final    Albumin 09/17/2024 4.0  3.5 - 5.2 g/dL Final    ALT (SGPT) 09/17/2024 34  1 - 41 U/L Final    AST (SGOT) 09/17/2024 35  1 - 40 U/L Final    Alkaline Phosphatase 09/17/2024 119 (H)  39 - 117 U/L Final    Total Bilirubin 09/17/2024 0.5  0.0 - 1.2 mg/dL Final    Globulin 09/17/2024 3.0  gm/dL Final    A/G Ratio 09/17/2024 1.3  g/dL Final    BUN/Creatinine Ratio 09/17/2024 19.7  7.0 - 25.0 Final    Anion Gap 09/17/2024 9.3  5.0 - 15.0 mmol/L Final    eGFR 09/17/2024 136.9  >60.0 mL/min/1.73 Final    Color, UA 09/17/2024 Yellow  Yellow, Straw Final    Appearance, UA 09/17/2024 Turbid (A)  Clear Final    pH, UA 09/17/2024 7.0  5.0 - 8.0 Final    Specific Gravity, UA 09/17/2024 1.026  1.005 - 1.030 Final    Glucose, UA 09/17/2024 >=1000 mg/dL (3+) (A)  Negative Final    Ketones, UA 09/17/2024 Negative  Negative Final    Bilirubin, UA 09/17/2024 Negative  Negative Final    Blood, UA 09/17/2024 Negative  Negative Final    Protein, UA 09/17/2024 Negative  Negative Final    Leuk Esterase, UA 09/17/2024 Negative  Negative Final    Nitrite, UA 09/17/2024 Negative  Negative Final    Urobilinogen, UA 09/17/2024 1.0 E.U./dL  0.2 - 1.0 E.U./dL Final    THC, Screen, Urine 09/17/2024 Negative  Negative Final    Phencyclidine (PCP), Urine 09/17/2024 Negative  Negative Final    Cocaine Screen, Urine 09/17/2024 Negative  Negative Final    Methamphetamine, Ur 09/17/2024 Negative  Negative Final    Opiate Screen 09/17/2024 Negative  Negative Final    Amphetamine Screen, Urine 09/17/2024 Negative  Negative Final    Benzodiazepine Screen, Urine 09/17/2024 Negative  Negative Final    Tricyclic Antidepressants Screen 09/17/2024 Negative  Negative Final     Methadone Screen, Urine 09/17/2024 Negative  Negative Final    Barbiturates Screen, Urine 09/17/2024 Negative  Negative Final    Oxycodone Screen, Urine 09/17/2024 Negative  Negative Final    Buprenorphine, Screen, Urine 09/17/2024 Negative  Negative Final    Magnesium 09/17/2024 1.7  1.6 - 2.6 mg/dL Final    Ethanol 09/17/2024 <10  0 - 10 mg/dL Final    Ethanol % 09/17/2024 <0.010  % Final    WBC 09/17/2024 3.63  3.40 - 10.80 10*3/mm3 Final    RBC 09/17/2024 4.00 (L)  4.14 - 5.80 10*6/mm3 Final    Hemoglobin 09/17/2024 12.9 (L)  13.0 - 17.7 g/dL Final    Hematocrit 09/17/2024 36.1 (L)  37.5 - 51.0 % Final    MCV 09/17/2024 90.3  79.0 - 97.0 fL Final    MCH 09/17/2024 32.3  26.6 - 33.0 pg Final    MCHC 09/17/2024 35.7  31.5 - 35.7 g/dL Final    RDW 09/17/2024 12.4  12.3 - 15.4 % Final    RDW-SD 09/17/2024 40.8  37.0 - 54.0 fl Final    MPV 09/17/2024 10.0  6.0 - 12.0 fL Final    Platelets 09/17/2024 104 (L)  140 - 450 10*3/mm3 Final    Neutrophil % 09/17/2024 44.6  42.7 - 76.0 % Final    Lymphocyte % 09/17/2024 42.1  19.6 - 45.3 % Final    Monocyte % 09/17/2024 10.5  5.0 - 12.0 % Final    Eosinophil % 09/17/2024 2.2  0.3 - 6.2 % Final    Basophil % 09/17/2024 0.3  0.0 - 1.5 % Final    Immature Grans % 09/17/2024 0.3  0.0 - 0.5 % Final    Neutrophils, Absolute 09/17/2024 1.62 (L)  1.70 - 7.00 10*3/mm3 Final    Lymphocytes, Absolute 09/17/2024 1.53  0.70 - 3.10 10*3/mm3 Final    Monocytes, Absolute 09/17/2024 0.38  0.10 - 0.90 10*3/mm3 Final    Eosinophils, Absolute 09/17/2024 0.08  0.00 - 0.40 10*3/mm3 Final    Basophils, Absolute 09/17/2024 0.01  0.00 - 0.20 10*3/mm3 Final    Immature Grans, Absolute 09/17/2024 0.01  0.00 - 0.05 10*3/mm3 Final    nRBC 09/17/2024 0.0  0.0 - 0.2 /100 WBC Final    Extra Tube 09/17/2024 Hold for add-ons.   Final    Auto resulted.    Extra Tube 09/17/2024 hold for add-on   Final    Auto resulted    Extra Tube 09/17/2024 Hold for add-ons.   Final    Auto resulted.    Extra Tube  09/17/2024 Hold for add-ons.   Final    Auto resulted    Fentanyl, Urine 09/17/2024 Negative  Negative Final    Glucose 09/18/2024 177 (H)  70 - 130 mg/dL Final   Admission on 09/10/2024, Discharged on 09/11/2024   Component Date Value Ref Range Status    Glucose 09/10/2024 179 (H)  65 - 99 mg/dL Final    BUN 09/10/2024 13  6 - 20 mg/dL Final    Creatinine 09/10/2024 0.75 (L)  0.76 - 1.27 mg/dL Final    Sodium 09/10/2024 139  136 - 145 mmol/L Final    Potassium 09/10/2024 3.5  3.5 - 5.2 mmol/L Final    Slight hemolysis detected by analyzer. Result may be falsely elevated.    Chloride 09/10/2024 103  98 - 107 mmol/L Final    CO2 09/10/2024 23.8  22.0 - 29.0 mmol/L Final    Calcium 09/10/2024 10.4  8.6 - 10.5 mg/dL Final    Total Protein 09/10/2024 7.4  6.0 - 8.5 g/dL Final    Albumin 09/10/2024 4.2  3.5 - 5.2 g/dL Final    ALT (SGPT) 09/10/2024 30  1 - 41 U/L Final    AST (SGOT) 09/10/2024 31  1 - 40 U/L Final    Alkaline Phosphatase 09/10/2024 127 (H)  39 - 117 U/L Final    Total Bilirubin 09/10/2024 0.6  0.0 - 1.2 mg/dL Final    Globulin 09/10/2024 3.2  gm/dL Final    A/G Ratio 09/10/2024 1.3  g/dL Final    BUN/Creatinine Ratio 09/10/2024 17.3  7.0 - 25.0 Final    Anion Gap 09/10/2024 12.2  5.0 - 15.0 mmol/L Final    eGFR 09/10/2024 131.7  >60.0 mL/min/1.73 Final    Lipase 09/10/2024 35  13 - 60 U/L Final    Color, UA 09/10/2024 Yellow  Yellow, Straw Final    Appearance, UA 09/10/2024 Clear  Clear Final    pH, UA 09/10/2024 6.0  5.0 - 8.0 Final    Specific Gravity, UA 09/10/2024 1.018  1.005 - 1.030 Final    Glucose, UA 09/10/2024 100 mg/dL (Trace) (A)  Negative Final    Ketones, UA 09/10/2024 Negative  Negative Final    Bilirubin, UA 09/10/2024 Negative  Negative Final    Blood, UA 09/10/2024 Negative  Negative Final    Protein, UA 09/10/2024 Negative  Negative Final    Leuk Esterase, UA 09/10/2024 Negative  Negative Final    Nitrite, UA 09/10/2024 Negative  Negative Final    Urobilinogen, UA 09/10/2024 1.0  E.U./dL  0.2 - 1.0 E.U./dL Final    Extra Tube 09/10/2024 Hold for add-ons.   Final    Auto resulted.    Extra Tube 09/10/2024 hold for add-on   Final    Auto resulted    Extra Tube 09/10/2024 Hold for add-ons.   Final    Auto resulted.    Extra Tube 09/10/2024 Hold for add-ons.   Final    Auto resulted    WBC 09/10/2024 4.84  3.40 - 10.80 10*3/mm3 Final    RBC 09/10/2024 4.33  4.14 - 5.80 10*6/mm3 Final    Hemoglobin 09/10/2024 13.8  13.0 - 17.7 g/dL Final    Hematocrit 09/10/2024 39.2  37.5 - 51.0 % Final    MCV 09/10/2024 90.5  79.0 - 97.0 fL Final    MCH 09/10/2024 31.9  26.6 - 33.0 pg Final    MCHC 09/10/2024 35.2  31.5 - 35.7 g/dL Final    RDW 09/10/2024 12.5  12.3 - 15.4 % Final    RDW-SD 09/10/2024 40.8  37.0 - 54.0 fl Final    MPV 09/10/2024 9.9  6.0 - 12.0 fL Final    Platelets 09/10/2024 130 (L)  140 - 450 10*3/mm3 Final    Neutrophil % 09/10/2024 47.9  42.7 - 76.0 % Final    Lymphocyte % 09/10/2024 40.5  19.6 - 45.3 % Final    Monocyte % 09/10/2024 9.1  5.0 - 12.0 % Final    Eosinophil % 09/10/2024 2.1  0.3 - 6.2 % Final    Basophil % 09/10/2024 0.2  0.0 - 1.5 % Final    Immature Grans % 09/10/2024 0.2  0.0 - 0.5 % Final    Neutrophils, Absolute 09/10/2024 2.32  1.70 - 7.00 10*3/mm3 Final    Lymphocytes, Absolute 09/10/2024 1.96  0.70 - 3.10 10*3/mm3 Final    Monocytes, Absolute 09/10/2024 0.44  0.10 - 0.90 10*3/mm3 Final    Eosinophils, Absolute 09/10/2024 0.10  0.00 - 0.40 10*3/mm3 Final    Basophils, Absolute 09/10/2024 0.01  0.00 - 0.20 10*3/mm3 Final    Immature Grans, Absolute 09/10/2024 0.01  0.00 - 0.05 10*3/mm3 Final    nRBC 09/10/2024 0.0  0.0 - 0.2 /100 WBC Final   Admission on 09/09/2024, Discharged on 09/09/2024   Component Date Value Ref Range Status    Glucose 09/09/2024 341 (H)  65 - 99 mg/dL Final    BUN 09/09/2024 11  6 - 20 mg/dL Final    Creatinine 09/09/2024 0.68 (L)  0.76 - 1.27 mg/dL Final    Sodium 09/09/2024 137  136 - 145 mmol/L Final    Potassium 09/09/2024 4.1  3.5 - 5.2  mmol/L Final    Slight hemolysis detected by analyzer. Result may be falsely elevated.    Chloride 09/09/2024 102  98 - 107 mmol/L Final    CO2 09/09/2024 23.4  22.0 - 29.0 mmol/L Final    Calcium 09/09/2024 9.6  8.6 - 10.5 mg/dL Final    Total Protein 09/09/2024 7.3  6.0 - 8.5 g/dL Final    Albumin 09/09/2024 4.2  3.5 - 5.2 g/dL Final    ALT (SGPT) 09/09/2024 33  1 - 41 U/L Final    AST (SGOT) 09/09/2024 29  1 - 40 U/L Final    Alkaline Phosphatase 09/09/2024 131 (H)  39 - 117 U/L Final    Total Bilirubin 09/09/2024 0.5  0.0 - 1.2 mg/dL Final    Globulin 09/09/2024 3.1  gm/dL Final    A/G Ratio 09/09/2024 1.4  g/dL Final    BUN/Creatinine Ratio 09/09/2024 16.2  7.0 - 25.0 Final    Anion Gap 09/09/2024 11.6  5.0 - 15.0 mmol/L Final    eGFR 09/09/2024 135.6  >60.0 mL/min/1.73 Final    HS Troponin T 09/09/2024 10  <22 ng/L Final    Extra Tube 09/09/2024 Hold for add-ons.   Final    Auto resulted.    Extra Tube 09/09/2024 hold for add-on   Final    Auto resulted    Extra Tube 09/09/2024 Hold for add-ons.   Final    Auto resulted.    Extra Tube 09/09/2024 Hold for add-ons.   Final    Auto resulted    WBC 09/09/2024 3.94  3.40 - 10.80 10*3/mm3 Final    RBC 09/09/2024 4.15  4.14 - 5.80 10*6/mm3 Final    Hemoglobin 09/09/2024 13.4  13.0 - 17.7 g/dL Final    Hematocrit 09/09/2024 37.8  37.5 - 51.0 % Final    MCV 09/09/2024 91.1  79.0 - 97.0 fL Final    MCH 09/09/2024 32.3  26.6 - 33.0 pg Final    MCHC 09/09/2024 35.4  31.5 - 35.7 g/dL Final    RDW 09/09/2024 12.4  12.3 - 15.4 % Final    RDW-SD 09/09/2024 40.6  37.0 - 54.0 fl Final    MPV 09/09/2024 9.9  6.0 - 12.0 fL Final    Platelets 09/09/2024 125 (L)  140 - 450 10*3/mm3 Final    Neutrophil % 09/09/2024 49.4  42.7 - 76.0 % Final    Lymphocyte % 09/09/2024 39.6  19.6 - 45.3 % Final    Monocyte % 09/09/2024 8.6  5.0 - 12.0 % Final    Eosinophil % 09/09/2024 1.8  0.3 - 6.2 % Final    Basophil % 09/09/2024 0.3  0.0 - 1.5 % Final    Immature Grans % 09/09/2024 0.3  0.0 - 0.5  % Final    Neutrophils, Absolute 09/09/2024 1.95  1.70 - 7.00 10*3/mm3 Final    Lymphocytes, Absolute 09/09/2024 1.56  0.70 - 3.10 10*3/mm3 Final    Monocytes, Absolute 09/09/2024 0.34  0.10 - 0.90 10*3/mm3 Final    Eosinophils, Absolute 09/09/2024 0.07  0.00 - 0.40 10*3/mm3 Final    Basophils, Absolute 09/09/2024 0.01  0.00 - 0.20 10*3/mm3 Final    Immature Grans, Absolute 09/09/2024 0.01  0.00 - 0.05 10*3/mm3 Final    nRBC 09/09/2024 0.0  0.0 - 0.2 /100 WBC Final    QT Interval 09/09/2024 388  ms Final    QTC Interval 09/09/2024 433  ms Final        Condition on Discharge:  improved    Vital Signs  Temp:  [97.3 °F (36.3 °C)-97.8 °F (36.6 °C)] 97.8 °F (36.6 °C)  Heart Rate:  [78] 78  Resp:  [18] 18  BP: (118-136)/(73) 118/73      Discharge Disposition:  Rehab Facility or Unit (DC - External)    Discharge Medications:     Discharge Medications        New Medications        Instructions Start Date   lamoTRIgine 25 MG tablet  Commonly known as: LaMICtal   Take 1 tablet by mouth Daily for 14 days, THEN 2 tablets Daily for 14 days. Indications: Manic-Depression   Start Date: September 24, 2024     Lurasidone HCl 20 MG tablet tablet  Commonly known as: LATUDA   20 mg, Oral, Daily With Dinner      traZODone 50 MG tablet  Commonly known as: DESYREL   50 mg, Oral, Nightly             Changes to Medications        Instructions Start Date   Lantus SoloStar 100 UNIT/ML injection pen  Generic drug: Insulin Glargine  What changed: how much to take   30 Units, Subcutaneous, 2 Times Daily             Continue These Medications        Instructions Start Date   hydrOXYzine 25 MG tablet  Commonly known as: ATARAX   25 mg, Oral, Every 4 Hours PRN      metFORMIN 500 MG tablet  Commonly known as: GLUCOPHAGE   500 mg, Oral, 2 Times Daily With Meals             Stop These Medications      ondansetron ODT 4 MG disintegrating tablet  Commonly known as: ZOFRAN-ODT     OXcarbazepine 300 MG tablet  Commonly known as: TRILEPTAL     risperiDONE  2 MG tablet  Commonly known as: risperDAL     sertraline 50 MG tablet  Commonly known as: ZOLOFT              Discharge Diet: Consistent carbohydrate     Activity at Discharge: As tolerated     Follow-up Appointments  Future Appointments   Date Time Provider Department Center   10/1/2024 11:30 AM Mirta Srinivasan MD [facility]       Time: I spent  > 30  minutes on this discharge activity which included: face-to-face encounter with the patient, reviewing the data in the system, coordination of the care with the nursing staff as well as consultants, documentation, and entering orders.        Clinician:   Ashley Thomas MD  09/23/24  11:14 EDT

## 2024-09-23 NOTE — PROGRESS NOTES
0246:     Therapist met 1-1 in the office. Patient calm/cooperative, oriented x 4.  Patient noted to have appropriate affect, congruent mood, normal thought content. Patient denies SI/HI/AVH and acute symptoms. Patient rates depression/anxiety at 0/10.  Patient reports good sleep and appetite.  Denies acute symptoms or withdrawal symptoms. Patient has been cooperative here with out outbursts.  He has been attending groups and socially active in the day room. No issues identified.     Concern was voiced regarding substance use and educated patient on risks associated with use. Patient was advised to abstain from use and educated on community resources that can help with sobriety and recovery.  [facility] and has accepted a bed at discharge. Patient has been attending individual and group therapy and has learned about CBT/DBT coping skills and relapse prevention.     Today, patient signed consent to involve his grandmother Bella at [phone]; No answer, left voicemail. Patient reports that he got ahold of his grandmother over the weekend and alerted her to where he is going at discharge.  Patient is his own guardian, he declines to involve other family.  Apparently there is pending DV charges between him and his father.          Assisted patient in identifying risk factors which would indicate the need for higher level of care including thoughts to harm self or others and/or self-harming behavior and encouraged patient to call [phone], call 911, or present to the nearest emergency room should any of these events occur. Discussed crisis intervention services and means to access.  Patient adamantly and convincingly denies current suicidal or homicidal ideation or perceptual disturbance.    Therapist completed the following safety plan with the patient       SAFETY/MENTAL HEALTH PLAN    1. Recognizing warning signs: Warning signs that a crisis may be developing such as  thoughts, images, mood, situation, behaviors:    My anger, contemplating suicide something like that.       2. Internal coping strategies: Things that the patient can do to take their mind off problems without contacting another person such as relaxation techniques, physical activity, etc:    Go for a walk to walk things off, exercising.     3. Socialization strategies for distraction and support: People and social settings that provide distraction or support    Get to know the guys at [facility] [address], go to classes.        4. Social contact for assistance in resolving crisis: People the patient can ask for help - names and telephone:     Grandmother Bella     5. Professionals or agencies contacts to help resolve crisis: Professionals or agencies the patient can contact during a crisis - clinician name/location/phone/emergency contact number, local urgent care services with address/phone, National Suicide Prevention Lifeline (986), Emergency contact 911:        988, 911 or have [facility] staff bring me here.     6. Means restriction: Ways to make the environment safe     No access to firearms, weapons, medications.  Patient going to secure rehab at discharge.

## 2024-09-23 NOTE — PROGRESS NOTES
4425    Today, patient signed consent to involve his grandmother Bella at [phone]; she was reached this afternoon. Therapist provided clinical update with discharge plan.  Bella verbalized understanding.  Bella inquired about why patient was going to [facility] [address] at discharge. Patient had requested other rehab due to being kicked out of [facility]. Bella reported it was her under impression that patient needed to stay in a rehab for 1 year. This is the first that this has been reported to therapist.  Patient did try to call his  in [facility]. No answer this date.  Therapist encouraged patient to contact this person to ensure he meets the criteria of his  and/or court.  Patient verbalized understanding. Patient reports that he is not court ordered to rehab and that it is his decision and [facility]. Therapist provided their name and numbers to patient's grandmother who appeared agreeable. No SI concerns identified. Patient was encouraged to work on his anger and how he gets a long with others. There was apparently a conflict at [facility] that patient does not go into detail about.

## 2024-09-23 NOTE — PAYOR COMM NOTE
"Ramiro Gonzales (19 y.o. Male)       Date of Birth   2005    Social Security Number       Address   [address] [facility] [address]    Home Phone   [phone]    MRN   [id number]       [place]   [place]osque    Marital Status   Single                            Admission Date   9/18/24    Admission Type   Emergency    Admitting Provider   Brent Galindo MD    Attending Provider       Department, Room/Bed   [facility] ADULT PSYCHIATRIC, 1021/1S       Discharge Date   9/23/2024    Discharge Disposition   Rehab Facility or Unit (DC - External)    Discharge Destination                                 Attending Provider: (none)   Allergies: No Known Allergies    Isolation: None   Infection: None   Code Status: CPR    Ht: 175.3 cm (69\")   Wt: 141 kg (311 lb)    Admission Cmt: None   Principal Problem: Suicidal behavior [R45.89]                   Active Insurance as of 9/18/2024       Primary Coverage       Payor Plan Insurance Group Employer/Plan Group    [employer] [place] [employer] [place]       Payor Plan Address Payor Plan Phone Number Payor Plan Fax Number Effective Dates    [address] [phone]  4/1/2024 - None Entered    [facility] [id number]         Subscriber Name Subscriber Birth Date Member ID       RAMIRO GONZALES 2005 [id number]               Secondary Coverage       Payor Plan Insurance Group Employer/Plan Group    [place] [id number]       Payor Plan Address Payor Plan Phone Number Payor Plan Fax Number Effective Dates    [employer] [address] [address] [phone]  9/1/2024 - None Entered    [facility] [id number]         Subscriber Name Subscriber Birth Date Member ID       RAMIRO GONZALES 2005 [id number]               Tertiary Coverage       Payor Plan Insurance Group Employer/Plan Group    MEDICAID PENDING MEDICAID PENDING        Payor Plan Address Payor Plan Phone Number Payor Plan Fax Number Effective " Dates       2024 - None Entered      Subscriber Name Subscriber Birth Date Member ID       RAMIRO GONZALES 2005 PENDING                     Emergency Contacts            No emergency contacts on file.                 Discharge Summary        Ashley Thomas MD at 24 1114          :  2005  MRN:  [id number]  Visit Number:  [id number]      Date of Admission:2024   Date of Discharge:  2024    Discharge Diagnosis:  Principal Problem:    Suicidal behavior  Active Problems:    Bipolar II disorder    Alcohol use disorder, severe, dependence    DM (diabetes mellitus), type 2        Admission Diagnosis:  Suicidal behavior [R45.89]     HPI  Ramiro Simons is a 19 y.o. male who was admitted on the unit on 2024 and was evaluated on 24. He presented to ER with SI and intent, with thoughts of cutting himself.    For details please see H&P dated 24.     Hospital Course  Patient is a 19 y.o. male presented with suicidal ideations. The patient was at a rehab facility for his history of alcohol use and he was not happy there and felt overwhelmed and suicidal. He was admitted to the adult psych unit for safety, further evaluation and treatment. The patient was diagnosed with bipolar II disorder and started on lamotrigine and lurasidone. He was able to take the medications without any adverse effects and reported they were helping. He was continued on metformin and insulin glargine and dose of insulin was increased from 22 U bid to 30 U bid.   The patient was also able to take part in individual and group counseling sessions and work on appropriate coping skills.  The patient made steady improvement in his mood and expressed feeling more positive and hopeful about future. Sleep and appetite were improved.  The day of discharge the patient was calm, cooperative and pleasant. Mood was reported to be good, and denied SI/HI/AVH. Also reported no medication side effects.        Mental Status  "Exam upon discharge:   Mood \"good\"   Affect-congruent, appropriate, stable  Thought Content-goal directed, no delusional material present  Thought process-linear, organized.  Suicidality: No SI  Homicidality: No HI  Perception: No AH/VH    Procedures Performed         Consults:   Consults       No orders found from 8/20/2024 to 9/19/2024.            Pertinent Test Results:   Admission on 09/18/2024   Component Date Value Ref Range Status    Hepatitis B Surface Ag 09/18/2024 Non-Reactive  Non-Reactive Final    Hep A IgM 09/18/2024 Non-Reactive  Non-Reactive Final    Hep B C IgM 09/18/2024 Non-Reactive  Non-Reactive Final    Hepatitis C Ab 09/18/2024 Non-Reactive  Non-Reactive Final    QT Interval 09/18/2024 408  ms Final    QTC Interval 09/18/2024 443  ms Final    Glucose 09/18/2024 208 (H)  70 - 130 mg/dL Final    Glucose 09/18/2024 273 (H)  70 - 130 mg/dL Final    Glucose 09/18/2024 261 (H)  70 - 130 mg/dL Final    Glucose 09/18/2024 320 (H)  70 - 130 mg/dL Final    Glucose 09/19/2024 270 (H)  70 - 130 mg/dL Final    Glucose 09/19/2024 238 (H)  70 - 130 mg/dL Final    Glucose 09/19/2024 241 (H)  70 - 130 mg/dL Final    Glucose 09/19/2024 336 (H)  70 - 130 mg/dL Final    TSH 09/20/2024 1.470  0.270 - 4.200 uIU/mL Final    Glucose 09/20/2024 268 (H)  70 - 130 mg/dL Final    Glucose 09/20/2024 282 (H)  70 - 130 mg/dL Final    Glucose 09/20/2024 379 (H)  70 - 130 mg/dL Final    Glucose 09/20/2024 351 (H)  70 - 130 mg/dL Final    Glucose 09/21/2024 289 (H)  70 - 130 mg/dL Final    Glucose 09/21/2024 300 (H)  70 - 130 mg/dL Final    Glucose 09/21/2024 394 (H)  70 - 130 mg/dL Final    Glucose 09/21/2024 375 (H)  70 - 130 mg/dL Final    Glucose 09/22/2024 275 (H)  70 - 130 mg/dL Final    Glucose 09/22/2024 434 (C)  70 - 130 mg/dL Final    Glucose 09/22/2024 284 (H)  70 - 130 mg/dL Final    Glucose 09/22/2024 314 (H)  70 - 130 mg/dL Final    Glucose 09/23/2024 213 (H)  70 - 130 mg/dL Final   Admission on 09/17/2024, " Discharged on 09/18/2024   Component Date Value Ref Range Status    Glucose 09/17/2024 272 (H)  65 - 99 mg/dL Final    BUN 09/17/2024 13  6 - 20 mg/dL Final    Creatinine 09/17/2024 0.66 (L)  0.76 - 1.27 mg/dL Final    Sodium 09/17/2024 137  136 - 145 mmol/L Final    Potassium 09/17/2024 4.0  3.5 - 5.2 mmol/L Final    Slight hemolysis detected by analyzer. Result may be falsely elevated.    Chloride 09/17/2024 106  98 - 107 mmol/L Final    CO2 09/17/2024 21.7 (L)  22.0 - 29.0 mmol/L Final    Calcium 09/17/2024 9.4  8.6 - 10.5 mg/dL Final    Total Protein 09/17/2024 7.0  6.0 - 8.5 g/dL Final    Albumin 09/17/2024 4.0  3.5 - 5.2 g/dL Final    ALT (SGPT) 09/17/2024 34  1 - 41 U/L Final    AST (SGOT) 09/17/2024 35  1 - 40 U/L Final    Alkaline Phosphatase 09/17/2024 119 (H)  39 - 117 U/L Final    Total Bilirubin 09/17/2024 0.5  0.0 - 1.2 mg/dL Final    Globulin 09/17/2024 3.0  gm/dL Final    A/G Ratio 09/17/2024 1.3  g/dL Final    BUN/Creatinine Ratio 09/17/2024 19.7  7.0 - 25.0 Final    Anion Gap 09/17/2024 9.3  5.0 - 15.0 mmol/L Final    eGFR 09/17/2024 136.9  >60.0 mL/min/1.73 Final    Color, UA 09/17/2024 Yellow  Yellow, Straw Final    Appearance, UA 09/17/2024 Turbid (A)  Clear Final    pH, UA 09/17/2024 7.0  5.0 - 8.0 Final    Specific Gravity, UA 09/17/2024 1.026  1.005 - 1.030 Final    Glucose, UA 09/17/2024 >=1000 mg/dL (3+) (A)  Negative Final    Ketones, UA 09/17/2024 Negative  Negative Final    Bilirubin, UA 09/17/2024 Negative  Negative Final    Blood, UA 09/17/2024 Negative  Negative Final    Protein, UA 09/17/2024 Negative  Negative Final    Leuk Esterase, UA 09/17/2024 Negative  Negative Final    Nitrite, UA 09/17/2024 Negative  Negative Final    Urobilinogen, UA 09/17/2024 1.0 E.U./dL  0.2 - 1.0 E.U./dL Final    THC, Screen, Urine 09/17/2024 Negative  Negative Final    Phencyclidine (PCP), Urine 09/17/2024 Negative  Negative Final    Cocaine Screen, Urine 09/17/2024 Negative  Negative Final     Methamphetamine, Ur 09/17/2024 Negative  Negative Final    Opiate Screen 09/17/2024 Negative  Negative Final    Amphetamine Screen, Urine 09/17/2024 Negative  Negative Final    Benzodiazepine Screen, Urine 09/17/2024 Negative  Negative Final    Tricyclic Antidepressants Screen 09/17/2024 Negative  Negative Final    Methadone Screen, Urine 09/17/2024 Negative  Negative Final    Barbiturates Screen, Urine 09/17/2024 Negative  Negative Final    Oxycodone Screen, Urine 09/17/2024 Negative  Negative Final    Buprenorphine, Screen, Urine 09/17/2024 Negative  Negative Final    Magnesium 09/17/2024 1.7  1.6 - 2.6 mg/dL Final    Ethanol 09/17/2024 <10  0 - 10 mg/dL Final    Ethanol % 09/17/2024 <0.010  % Final    WBC 09/17/2024 3.63  3.40 - 10.80 10*3/mm3 Final    RBC 09/17/2024 4.00 (L)  4.14 - 5.80 10*6/mm3 Final    Hemoglobin 09/17/2024 12.9 (L)  13.0 - 17.7 g/dL Final    Hematocrit 09/17/2024 36.1 (L)  37.5 - 51.0 % Final    MCV 09/17/2024 90.3  79.0 - 97.0 fL Final    MCH 09/17/2024 32.3  26.6 - 33.0 pg Final    MCHC 09/17/2024 35.7  31.5 - 35.7 g/dL Final    RDW 09/17/2024 12.4  12.3 - 15.4 % Final    RDW-SD 09/17/2024 40.8  37.0 - 54.0 fl Final    MPV 09/17/2024 10.0  6.0 - 12.0 fL Final    Platelets 09/17/2024 104 (L)  140 - 450 10*3/mm3 Final    Neutrophil % 09/17/2024 44.6  42.7 - 76.0 % Final    Lymphocyte % 09/17/2024 42.1  19.6 - 45.3 % Final    Monocyte % 09/17/2024 10.5  5.0 - 12.0 % Final    Eosinophil % 09/17/2024 2.2  0.3 - 6.2 % Final    Basophil % 09/17/2024 0.3  0.0 - 1.5 % Final    Immature Grans % 09/17/2024 0.3  0.0 - 0.5 % Final    Neutrophils, Absolute 09/17/2024 1.62 (L)  1.70 - 7.00 10*3/mm3 Final    Lymphocytes, Absolute 09/17/2024 1.53  0.70 - 3.10 10*3/mm3 Final    Monocytes, Absolute 09/17/2024 0.38  0.10 - 0.90 10*3/mm3 Final    Eosinophils, Absolute 09/17/2024 0.08  0.00 - 0.40 10*3/mm3 Final    Basophils, Absolute 09/17/2024 0.01  0.00 - 0.20 10*3/mm3 Final    Immature Grans, Absolute  09/17/2024 0.01  0.00 - 0.05 10*3/mm3 Final    nRBC 09/17/2024 0.0  0.0 - 0.2 /100 WBC Final    Extra Tube 09/17/2024 Hold for add-ons.   Final    Auto resulted.    Extra Tube 09/17/2024 hold for add-on   Final    Auto resulted    Extra Tube 09/17/2024 Hold for add-ons.   Final    Auto resulted.    Extra Tube 09/17/2024 Hold for add-ons.   Final    Auto resulted    Fentanyl, Urine 09/17/2024 Negative  Negative Final    Glucose 09/18/2024 177 (H)  70 - 130 mg/dL Final   Admission on 09/10/2024, Discharged on 09/11/2024   Component Date Value Ref Range Status    Glucose 09/10/2024 179 (H)  65 - 99 mg/dL Final    BUN 09/10/2024 13  6 - 20 mg/dL Final    Creatinine 09/10/2024 0.75 (L)  0.76 - 1.27 mg/dL Final    Sodium 09/10/2024 139  136 - 145 mmol/L Final    Potassium 09/10/2024 3.5  3.5 - 5.2 mmol/L Final    Slight hemolysis detected by analyzer. Result may be falsely elevated.    Chloride 09/10/2024 103  98 - 107 mmol/L Final    CO2 09/10/2024 23.8  22.0 - 29.0 mmol/L Final    Calcium 09/10/2024 10.4  8.6 - 10.5 mg/dL Final    Total Protein 09/10/2024 7.4  6.0 - 8.5 g/dL Final    Albumin 09/10/2024 4.2  3.5 - 5.2 g/dL Final    ALT (SGPT) 09/10/2024 30  1 - 41 U/L Final    AST (SGOT) 09/10/2024 31  1 - 40 U/L Final    Alkaline Phosphatase 09/10/2024 127 (H)  39 - 117 U/L Final    Total Bilirubin 09/10/2024 0.6  0.0 - 1.2 mg/dL Final    Globulin 09/10/2024 3.2  gm/dL Final    A/G Ratio 09/10/2024 1.3  g/dL Final    BUN/Creatinine Ratio 09/10/2024 17.3  7.0 - 25.0 Final    Anion Gap 09/10/2024 12.2  5.0 - 15.0 mmol/L Final    eGFR 09/10/2024 131.7  >60.0 mL/min/1.73 Final    Lipase 09/10/2024 35  13 - 60 U/L Final    Color, UA 09/10/2024 Yellow  Yellow, Straw Final    Appearance, UA 09/10/2024 Clear  Clear Final    pH, UA 09/10/2024 6.0  5.0 - 8.0 Final    Specific Gravity, UA 09/10/2024 1.018  1.005 - 1.030 Final    Glucose, UA 09/10/2024 100 mg/dL (Trace) (A)  Negative Final    Ketones, UA 09/10/2024 Negative   Negative Final    Bilirubin, UA 09/10/2024 Negative  Negative Final    Blood, UA 09/10/2024 Negative  Negative Final    Protein, UA 09/10/2024 Negative  Negative Final    Leuk Esterase, UA 09/10/2024 Negative  Negative Final    Nitrite, UA 09/10/2024 Negative  Negative Final    Urobilinogen, UA 09/10/2024 1.0 E.U./dL  0.2 - 1.0 E.U./dL Final    Extra Tube 09/10/2024 Hold for add-ons.   Final    Auto resulted.    Extra Tube 09/10/2024 hold for add-on   Final    Auto resulted    Extra Tube 09/10/2024 Hold for add-ons.   Final    Auto resulted.    Extra Tube 09/10/2024 Hold for add-ons.   Final    Auto resulted    WBC 09/10/2024 4.84  3.40 - 10.80 10*3/mm3 Final    RBC 09/10/2024 4.33  4.14 - 5.80 10*6/mm3 Final    Hemoglobin 09/10/2024 13.8  13.0 - 17.7 g/dL Final    Hematocrit 09/10/2024 39.2  37.5 - 51.0 % Final    MCV 09/10/2024 90.5  79.0 - 97.0 fL Final    MCH 09/10/2024 31.9  26.6 - 33.0 pg Final    MCHC 09/10/2024 35.2  31.5 - 35.7 g/dL Final    RDW 09/10/2024 12.5  12.3 - 15.4 % Final    RDW-SD 09/10/2024 40.8  37.0 - 54.0 fl Final    MPV 09/10/2024 9.9  6.0 - 12.0 fL Final    Platelets 09/10/2024 130 (L)  140 - 450 10*3/mm3 Final    Neutrophil % 09/10/2024 47.9  42.7 - 76.0 % Final    Lymphocyte % 09/10/2024 40.5  19.6 - 45.3 % Final    Monocyte % 09/10/2024 9.1  5.0 - 12.0 % Final    Eosinophil % 09/10/2024 2.1  0.3 - 6.2 % Final    Basophil % 09/10/2024 0.2  0.0 - 1.5 % Final    Immature Grans % 09/10/2024 0.2  0.0 - 0.5 % Final    Neutrophils, Absolute 09/10/2024 2.32  1.70 - 7.00 10*3/mm3 Final    Lymphocytes, Absolute 09/10/2024 1.96  0.70 - 3.10 10*3/mm3 Final    Monocytes, Absolute 09/10/2024 0.44  0.10 - 0.90 10*3/mm3 Final    Eosinophils, Absolute 09/10/2024 0.10  0.00 - 0.40 10*3/mm3 Final    Basophils, Absolute 09/10/2024 0.01  0.00 - 0.20 10*3/mm3 Final    Immature Grans, Absolute 09/10/2024 0.01  0.00 - 0.05 10*3/mm3 Final    nRBC 09/10/2024 0.0  0.0 - 0.2 /100 WBC Final   Admission on  09/09/2024, Discharged on 09/09/2024   Component Date Value Ref Range Status    Glucose 09/09/2024 341 (H)  65 - 99 mg/dL Final    BUN 09/09/2024 11  6 - 20 mg/dL Final    Creatinine 09/09/2024 0.68 (L)  0.76 - 1.27 mg/dL Final    Sodium 09/09/2024 137  136 - 145 mmol/L Final    Potassium 09/09/2024 4.1  3.5 - 5.2 mmol/L Final    Slight hemolysis detected by analyzer. Result may be falsely elevated.    Chloride 09/09/2024 102  98 - 107 mmol/L Final    CO2 09/09/2024 23.4  22.0 - 29.0 mmol/L Final    Calcium 09/09/2024 9.6  8.6 - 10.5 mg/dL Final    Total Protein 09/09/2024 7.3  6.0 - 8.5 g/dL Final    Albumin 09/09/2024 4.2  3.5 - 5.2 g/dL Final    ALT (SGPT) 09/09/2024 33  1 - 41 U/L Final    AST (SGOT) 09/09/2024 29  1 - 40 U/L Final    Alkaline Phosphatase 09/09/2024 131 (H)  39 - 117 U/L Final    Total Bilirubin 09/09/2024 0.5  0.0 - 1.2 mg/dL Final    Globulin 09/09/2024 3.1  gm/dL Final    A/G Ratio 09/09/2024 1.4  g/dL Final    BUN/Creatinine Ratio 09/09/2024 16.2  7.0 - 25.0 Final    Anion Gap 09/09/2024 11.6  5.0 - 15.0 mmol/L Final    eGFR 09/09/2024 135.6  >60.0 mL/min/1.73 Final    HS Troponin T 09/09/2024 10  <22 ng/L Final    Extra Tube 09/09/2024 Hold for add-ons.   Final    Auto resulted.    Extra Tube 09/09/2024 hold for add-on   Final    Auto resulted    Extra Tube 09/09/2024 Hold for add-ons.   Final    Auto resulted.    Extra Tube 09/09/2024 Hold for add-ons.   Final    Auto resulted    WBC 09/09/2024 3.94  3.40 - 10.80 10*3/mm3 Final    RBC 09/09/2024 4.15  4.14 - 5.80 10*6/mm3 Final    Hemoglobin 09/09/2024 13.4  13.0 - 17.7 g/dL Final    Hematocrit 09/09/2024 37.8  37.5 - 51.0 % Final    MCV 09/09/2024 91.1  79.0 - 97.0 fL Final    MCH 09/09/2024 32.3  26.6 - 33.0 pg Final    MCHC 09/09/2024 35.4  31.5 - 35.7 g/dL Final    RDW 09/09/2024 12.4  12.3 - 15.4 % Final    RDW-SD 09/09/2024 40.6  37.0 - 54.0 fl Final    MPV 09/09/2024 9.9  6.0 - 12.0 fL Final    Platelets 09/09/2024 125 (L)  140 -  450 10*3/mm3 Final    Neutrophil % 09/09/2024 49.4  42.7 - 76.0 % Final    Lymphocyte % 09/09/2024 39.6  19.6 - 45.3 % Final    Monocyte % 09/09/2024 8.6  5.0 - 12.0 % Final    Eosinophil % 09/09/2024 1.8  0.3 - 6.2 % Final    Basophil % 09/09/2024 0.3  0.0 - 1.5 % Final    Immature Grans % 09/09/2024 0.3  0.0 - 0.5 % Final    Neutrophils, Absolute 09/09/2024 1.95  1.70 - 7.00 10*3/mm3 Final    Lymphocytes, Absolute 09/09/2024 1.56  0.70 - 3.10 10*3/mm3 Final    Monocytes, Absolute 09/09/2024 0.34  0.10 - 0.90 10*3/mm3 Final    Eosinophils, Absolute 09/09/2024 0.07  0.00 - 0.40 10*3/mm3 Final    Basophils, Absolute 09/09/2024 0.01  0.00 - 0.20 10*3/mm3 Final    Immature Grans, Absolute 09/09/2024 0.01  0.00 - 0.05 10*3/mm3 Final    nRBC 09/09/2024 0.0  0.0 - 0.2 /100 WBC Final    QT Interval 09/09/2024 388  ms Final    QTC Interval 09/09/2024 433  ms Final        Condition on Discharge:  improved    Vital Signs  Temp:  [97.3 °F (36.3 °C)-97.8 °F (36.6 °C)] 97.8 °F (36.6 °C)  Heart Rate:  [78] 78  Resp:  [18] 18  BP: (118-136)/(73) 118/73      Discharge Disposition:  Rehab Facility or Unit (DC - External)    Discharge Medications:     Discharge Medications        New Medications        Instructions Start Date   lamoTRIgine 25 MG tablet  Commonly known as: LaMICtal   Take 1 tablet by mouth Daily for 14 days, THEN 2 tablets Daily for 14 days. Indications: Manic-Depression   Start Date: September 24, 2024     Lurasidone HCl 20 MG tablet tablet  Commonly known as: LATUDA   20 mg, Oral, Daily With Dinner      traZODone 50 MG tablet  Commonly known as: DESYREL   50 mg, Oral, Nightly             Changes to Medications        Instructions Start Date   Lantus SoloStar 100 UNIT/ML injection pen  Generic drug: Insulin Glargine  What changed: how much to take   30 Units, Subcutaneous, 2 Times Daily             Continue These Medications        Instructions Start Date   hydrOXYzine 25 MG tablet  Commonly known as: ATARAX   25 mg,  Oral, Every 4 Hours PRN      metFORMIN 500 MG tablet  Commonly known as: GLUCOPHAGE   500 mg, Oral, 2 Times Daily With Meals             Stop These Medications      ondansetron ODT 4 MG disintegrating tablet  Commonly known as: ZOFRAN-ODT     OXcarbazepine 300 MG tablet  Commonly known as: TRILEPTAL     risperiDONE 2 MG tablet  Commonly known as: risperDAL     sertraline 50 MG tablet  Commonly known as: ZOLOFT              Discharge Diet: Consistent carbohydrate     Activity at Discharge: As tolerated     Follow-up Appointments  Future Appointments   Date Time Provider Department Center   10/1/2024 11:30 AM Mirta Srinivasan MD [facility]     ADMIT TO:  [facility] [phone] :  [address] [address], [place] [address]    9/23/2024    Time: I spent  > 30  minutes on this discharge activity which included: face-to-face encounter with the patient, reviewing the data in the system, coordination of the care with the nursing staff as well as consultants, documentation, and entering orders.        Clinician:   Ashley Thomas MD  09/23/24  11:14 EDT    Electronically signed by Ahsley Thomas MD at 09/23/24 1232          Ama Verdugo   Therapist  Psychiatry     Progress Notes      Signed     Date of Service: 09/23/24 1258  Creation Time: 09/23/24 1258     Signed         1258     Today, patient signed consent to involve his grandmother Bella at [phone]; she was reached this afternoon. Therapist provided clinical update with discharge plan.  Bella verbalized understanding.  Bella inquired about why patient was going to [facility] at discharge. Patient had requested other rehab due to being kicked out of [facility]. Bella reported it was her under impression that patient needed to stay in a rehab for 1 year. This is the first that this has been reported to therapist.  Patient did try to call his  in [facility]. No answer this date.  Therapist encouraged patient  to contact this person to ensure he meets the criteria of his  and/or court.  Patient verbalized understanding. Patient reports that he is not court ordered to rehab and that it is his decision and he consents to go to [facility]. Therapist provided their name and numbers to patient's grandmother who appeared agreeable. No SI concerns identified. Patient was encouraged to work on his anger and how he gets a long with others. There was apparently a conflict at [facility] that patient does not go into detail about.

## 2024-09-23 NOTE — PLAN OF CARE
Goal Outcome Evaluation:  Plan of Care Reviewed With: patient  Patient Agreement with Plan of Care: agrees     Progress: improving  Outcome Evaluation: Patient denies any Anx/Dep SI/HI/AVH no behavioural issues interacting well with staff and peers

## 2024-09-23 NOTE — PROGRESS NOTES
Navigator is helping with the following referral:    [facility] - [phone]  -Admissions not in the office. Staff states they will have someone return call. 9/18  -Admissions still not in the office. 9/18  - today at 1:00pm. Spoke with [facility] staff.  9/23

## 2024-09-25 ENCOUNTER — HOSPITAL ENCOUNTER (INPATIENT)
Facility: HOSPITAL | Age: 19
LOS: 5 days | Discharge: HOME | DRG: 885 | End: 2024-09-30
Attending: PSYCHIATRY & NEUROLOGY | Admitting: PSYCHIATRY & NEUROLOGY
Payer: COMMERCIAL

## 2024-09-25 ENCOUNTER — HOSPITAL ENCOUNTER (EMERGENCY)
Facility: HOSPITAL | Age: 19
Discharge: HOME | End: 2024-09-25
Attending: STUDENT IN AN ORGANIZED HEALTH CARE EDUCATION/TRAINING PROGRAM | Admitting: STUDENT IN AN ORGANIZED HEALTH CARE EDUCATION/TRAINING PROGRAM
Payer: COMMERCIAL

## 2024-09-25 ENCOUNTER — HOSPITAL ENCOUNTER (EMERGENCY)
Facility: HOSPITAL | Age: 19
Discharge: STILL ADMITTED OR ADMINISTRATIVE | DRG: 885 | End: 2024-09-25
Attending: STUDENT IN AN ORGANIZED HEALTH CARE EDUCATION/TRAINING PROGRAM
Payer: COMMERCIAL

## 2024-09-25 VITALS
HEART RATE: 96 BPM | RESPIRATION RATE: 18 BRPM | OXYGEN SATURATION: 96 % | SYSTOLIC BLOOD PRESSURE: 148 MMHG | WEIGHT: 315 LBS | HEIGHT: 69 IN | BODY MASS INDEX: 46.65 KG/M2 | DIASTOLIC BLOOD PRESSURE: 84 MMHG | TEMPERATURE: 97.6 F

## 2024-09-25 VITALS
RESPIRATION RATE: 18 BRPM | SYSTOLIC BLOOD PRESSURE: 165 MMHG | BODY MASS INDEX: 46.65 KG/M2 | TEMPERATURE: 97.7 F | OXYGEN SATURATION: 96 % | HEART RATE: 88 BPM | HEIGHT: 69 IN | WEIGHT: 315 LBS | DIASTOLIC BLOOD PRESSURE: 91 MMHG

## 2024-09-25 DIAGNOSIS — F43.0 STRESS REACTION: Primary | ICD-10-CM

## 2024-09-25 DIAGNOSIS — R45.851 SUICIDAL IDEATION: Primary | ICD-10-CM

## 2024-09-25 LAB
ACETONE BLD QL: NEGATIVE
ALBUMIN SERPL-MCNC: 3.8 G/DL (ref 3.5–5.2)
ALBUMIN SERPL-MCNC: 4.3 G/DL (ref 3.5–5.2)
ALBUMIN/GLOB SERPL: 1.1 G/DL
ALBUMIN/GLOB SERPL: 1.3 G/DL
ALP SERPL-CCNC: 135 U/L (ref 39–117)
ALP SERPL-CCNC: 142 U/L (ref 39–117)
ALT SERPL W P-5'-P-CCNC: 47 U/L (ref 1–41)
ALT SERPL W P-5'-P-CCNC: 48 U/L (ref 1–41)
AMPHET+METHAMPHET UR QL: NEGATIVE
AMPHET+METHAMPHET UR QL: NEGATIVE
AMPHETAMINES UR QL: NEGATIVE
AMPHETAMINES UR QL: NEGATIVE
ANION GAP SERPL CALCULATED.3IONS-SCNC: 11.8 MMOL/L (ref 5–15)
ANION GAP SERPL CALCULATED.3IONS-SCNC: 16.8 MMOL/L (ref 5–15)
AST SERPL-CCNC: 38 U/L (ref 1–40)
AST SERPL-CCNC: 43 U/L (ref 1–40)
BARBITURATES UR QL SCN: NEGATIVE
BARBITURATES UR QL SCN: NEGATIVE
BASOPHILS # BLD AUTO: 0.01 10*3/MM3 (ref 0–0.2)
BASOPHILS # BLD AUTO: 0.01 10*3/MM3 (ref 0–0.2)
BASOPHILS NFR BLD AUTO: 0.2 % (ref 0–1.5)
BASOPHILS NFR BLD AUTO: 0.2 % (ref 0–1.5)
BENZODIAZ UR QL SCN: NEGATIVE
BENZODIAZ UR QL SCN: NEGATIVE
BILIRUB SERPL-MCNC: 0.8 MG/DL (ref 0–1.2)
BILIRUB SERPL-MCNC: 0.9 MG/DL (ref 0–1.2)
BILIRUB UR QL STRIP: NEGATIVE
BILIRUB UR QL STRIP: NEGATIVE
BUN SERPL-MCNC: 11 MG/DL (ref 6–20)
BUN SERPL-MCNC: 11 MG/DL (ref 6–20)
BUN/CREAT SERPL: 14.1 (ref 7–25)
BUN/CREAT SERPL: 15.7 (ref 7–25)
BUPRENORPHINE SERPL-MCNC: NEGATIVE NG/ML
BUPRENORPHINE SERPL-MCNC: NEGATIVE NG/ML
CALCIUM SPEC-SCNC: 9.2 MG/DL (ref 8.6–10.5)
CALCIUM SPEC-SCNC: 9.4 MG/DL (ref 8.6–10.5)
CANNABINOIDS SERPL QL: NEGATIVE
CANNABINOIDS SERPL QL: NEGATIVE
CHLORIDE SERPL-SCNC: 103 MMOL/L (ref 98–107)
CHLORIDE SERPL-SCNC: 103 MMOL/L (ref 98–107)
CLARITY UR: CLEAR
CLARITY UR: CLEAR
CLUMPED PLATELETS: PRESENT
CO2 SERPL-SCNC: 15.2 MMOL/L (ref 22–29)
CO2 SERPL-SCNC: 21.2 MMOL/L (ref 22–29)
COCAINE UR QL: NEGATIVE
COCAINE UR QL: NEGATIVE
COLOR UR: YELLOW
COLOR UR: YELLOW
CREAT SERPL-MCNC: 0.7 MG/DL (ref 0.76–1.27)
CREAT SERPL-MCNC: 0.78 MG/DL (ref 0.76–1.27)
DEPRECATED RDW RBC AUTO: 42.1 FL (ref 37–54)
DEPRECATED RDW RBC AUTO: 43.8 FL (ref 37–54)
EGFRCR SERPLBLD CKD-EPI 2021: 130.1 ML/MIN/1.73
EGFRCR SERPLBLD CKD-EPI 2021: 134.4 ML/MIN/1.73
EOSINOPHIL # BLD AUTO: 0.08 10*3/MM3 (ref 0–0.4)
EOSINOPHIL # BLD AUTO: 0.08 10*3/MM3 (ref 0–0.4)
EOSINOPHIL NFR BLD AUTO: 1.8 % (ref 0.3–6.2)
EOSINOPHIL NFR BLD AUTO: 2 % (ref 0.3–6.2)
ERYTHROCYTE [DISTWIDTH] IN BLOOD BY AUTOMATED COUNT: 12.7 % (ref 12.3–15.4)
ERYTHROCYTE [DISTWIDTH] IN BLOOD BY AUTOMATED COUNT: 12.8 % (ref 12.3–15.4)
ETHANOL BLD-MCNC: <10 MG/DL (ref 0–10)
ETHANOL BLD-MCNC: <10 MG/DL (ref 0–10)
ETHANOL UR QL: <0.01 %
ETHANOL UR QL: <0.01 %
FENTANYL UR-MCNC: NEGATIVE NG/ML
FENTANYL UR-MCNC: NEGATIVE NG/ML
GLOBULIN UR ELPH-MCNC: 3.4 GM/DL
GLOBULIN UR ELPH-MCNC: 3.6 GM/DL
GLUCOSE SERPL-MCNC: 383 MG/DL (ref 65–99)
GLUCOSE SERPL-MCNC: 463 MG/DL (ref 65–99)
GLUCOSE UR STRIP-MCNC: ABNORMAL MG/DL
GLUCOSE UR STRIP-MCNC: ABNORMAL MG/DL
HCT VFR BLD AUTO: 39.8 % (ref 37.5–51)
HCT VFR BLD AUTO: 41 % (ref 37.5–51)
HGB BLD-MCNC: 14.1 G/DL (ref 13–17.7)
HGB BLD-MCNC: 14.2 G/DL (ref 13–17.7)
HGB UR QL STRIP.AUTO: NEGATIVE
HGB UR QL STRIP.AUTO: NEGATIVE
IMM GRANULOCYTES # BLD AUTO: 0.01 10*3/MM3 (ref 0–0.05)
IMM GRANULOCYTES # BLD AUTO: 0.02 10*3/MM3 (ref 0–0.05)
IMM GRANULOCYTES NFR BLD AUTO: 0.2 % (ref 0–0.5)
IMM GRANULOCYTES NFR BLD AUTO: 0.5 % (ref 0–0.5)
KETONES UR QL STRIP: NEGATIVE
KETONES UR QL STRIP: NEGATIVE
LEUKOCYTE ESTERASE UR QL STRIP.AUTO: NEGATIVE
LEUKOCYTE ESTERASE UR QL STRIP.AUTO: NEGATIVE
LYMPHOCYTES # BLD AUTO: 1.34 10*3/MM3 (ref 0.7–3.1)
LYMPHOCYTES # BLD AUTO: 1.76 10*3/MM3 (ref 0.7–3.1)
LYMPHOCYTES NFR BLD AUTO: 33.3 % (ref 19.6–45.3)
LYMPHOCYTES NFR BLD AUTO: 40.6 % (ref 19.6–45.3)
MAGNESIUM SERPL-MCNC: 2 MG/DL (ref 1.6–2.6)
MAGNESIUM SERPL-MCNC: 2 MG/DL (ref 1.6–2.6)
MCH RBC QN AUTO: 32.2 PG (ref 26.6–33)
MCH RBC QN AUTO: 32.6 PG (ref 26.6–33)
MCHC RBC AUTO-ENTMCNC: 34.6 G/DL (ref 31.5–35.7)
MCHC RBC AUTO-ENTMCNC: 35.4 G/DL (ref 31.5–35.7)
MCV RBC AUTO: 90.9 FL (ref 79–97)
MCV RBC AUTO: 94.3 FL (ref 79–97)
METHADONE UR QL SCN: NEGATIVE
METHADONE UR QL SCN: NEGATIVE
MONOCYTES # BLD AUTO: 0.41 10*3/MM3 (ref 0.1–0.9)
MONOCYTES # BLD AUTO: 0.45 10*3/MM3 (ref 0.1–0.9)
MONOCYTES NFR BLD AUTO: 10.2 % (ref 5–12)
MONOCYTES NFR BLD AUTO: 10.4 % (ref 5–12)
NEUTROPHILS NFR BLD AUTO: 2.02 10*3/MM3 (ref 1.7–7)
NEUTROPHILS NFR BLD AUTO: 2.18 10*3/MM3 (ref 1.7–7)
NEUTROPHILS NFR BLD AUTO: 46.5 % (ref 42.7–76)
NEUTROPHILS NFR BLD AUTO: 54.1 % (ref 42.7–76)
NITRITE UR QL STRIP: NEGATIVE
NITRITE UR QL STRIP: NEGATIVE
NRBC BLD AUTO-RTO: 0 /100 WBC (ref 0–0.2)
NRBC BLD AUTO-RTO: 0 /100 WBC (ref 0–0.2)
OPIATES UR QL: NEGATIVE
OPIATES UR QL: NEGATIVE
OXYCODONE UR QL SCN: NEGATIVE
OXYCODONE UR QL SCN: NEGATIVE
PCP UR QL SCN: NEGATIVE
PCP UR QL SCN: NEGATIVE
PH UR STRIP.AUTO: 6.5 [PH] (ref 5–8)
PH UR STRIP.AUTO: 7 [PH] (ref 5–8)
PLATELET # BLD AUTO: 112 10*3/MM3 (ref 140–450)
PLATELET # BLD AUTO: 54 10*3/MM3 (ref 140–450)
PMV BLD AUTO: 10 FL (ref 6–12)
PMV BLD AUTO: 10.6 FL (ref 6–12)
POTASSIUM SERPL-SCNC: 4.1 MMOL/L (ref 3.5–5.2)
POTASSIUM SERPL-SCNC: 4.4 MMOL/L (ref 3.5–5.2)
PROT SERPL-MCNC: 7.4 G/DL (ref 6–8.5)
PROT SERPL-MCNC: 7.7 G/DL (ref 6–8.5)
PROT UR QL STRIP: ABNORMAL
PROT UR QL STRIP: NEGATIVE
RBC # BLD AUTO: 4.35 10*6/MM3 (ref 4.14–5.8)
RBC # BLD AUTO: 4.38 10*6/MM3 (ref 4.14–5.8)
RBC MORPH BLD: NORMAL
SODIUM SERPL-SCNC: 135 MMOL/L (ref 136–145)
SODIUM SERPL-SCNC: 136 MMOL/L (ref 136–145)
SP GR UR STRIP: >1.03 (ref 1–1.03)
SP GR UR STRIP: >1.03 (ref 1–1.03)
TRICYCLICS UR QL SCN: NEGATIVE
TRICYCLICS UR QL SCN: NEGATIVE
UROBILINOGEN UR QL STRIP: ABNORMAL
UROBILINOGEN UR QL STRIP: ABNORMAL
WBC NRBC COR # BLD AUTO: 4.03 10*3/MM3 (ref 3.4–10.8)
WBC NRBC COR # BLD AUTO: 4.34 10*3/MM3 (ref 3.4–10.8)

## 2024-09-25 PROCEDURE — 80053 COMPREHEN METABOLIC PANEL: CPT | Performed by: STUDENT IN AN ORGANIZED HEALTH CARE EDUCATION/TRAINING PROGRAM

## 2024-09-25 PROCEDURE — 85025 COMPLETE CBC W/AUTO DIFF WBC: CPT | Performed by: STUDENT IN AN ORGANIZED HEALTH CARE EDUCATION/TRAINING PROGRAM

## 2024-09-25 PROCEDURE — 99284 EMERGENCY DEPT VISIT MOD MDM: CPT

## 2024-09-25 PROCEDURE — 82077 ASSAY SPEC XCP UR&BREATH IA: CPT | Performed by: STUDENT IN AN ORGANIZED HEALTH CARE EDUCATION/TRAINING PROGRAM

## 2024-09-25 PROCEDURE — 80074 ACUTE HEPATITIS PANEL: CPT | Performed by: PSYCHIATRY & NEUROLOGY

## 2024-09-25 PROCEDURE — 81003 URINALYSIS AUTO W/O SCOPE: CPT | Performed by: STUDENT IN AN ORGANIZED HEALTH CARE EDUCATION/TRAINING PROGRAM

## 2024-09-25 PROCEDURE — 99285 EMERGENCY DEPT VISIT HI MDM: CPT

## 2024-09-25 PROCEDURE — 82009 KETONE BODYS QUAL: CPT | Performed by: PHYSICIAN ASSISTANT

## 2024-09-25 PROCEDURE — 36415 COLL VENOUS BLD VENIPUNCTURE: CPT

## 2024-09-25 PROCEDURE — 83735 ASSAY OF MAGNESIUM: CPT | Performed by: STUDENT IN AN ORGANIZED HEALTH CARE EDUCATION/TRAINING PROGRAM

## 2024-09-25 PROCEDURE — 93005 ELECTROCARDIOGRAM TRACING: CPT | Performed by: PSYCHIATRY & NEUROLOGY

## 2024-09-25 PROCEDURE — 85007 BL SMEAR W/DIFF WBC COUNT: CPT | Performed by: STUDENT IN AN ORGANIZED HEALTH CARE EDUCATION/TRAINING PROGRAM

## 2024-09-25 PROCEDURE — 63710000001 INSULIN REGULAR HUMAN PER 5 UNITS: Performed by: PHYSICIAN ASSISTANT

## 2024-09-25 PROCEDURE — 80307 DRUG TEST PRSMV CHEM ANLYZR: CPT | Performed by: STUDENT IN AN ORGANIZED HEALTH CARE EDUCATION/TRAINING PROGRAM

## 2024-09-25 PROCEDURE — 36415 COLL VENOUS BLD VENIPUNCTURE: CPT | Performed by: STUDENT IN AN ORGANIZED HEALTH CARE EDUCATION/TRAINING PROGRAM

## 2024-09-25 RX ORDER — IBUPROFEN 400 MG/1
400 TABLET, FILM COATED ORAL EVERY 6 HOURS PRN
Status: DISCONTINUED | OUTPATIENT
Start: 2024-09-25 | End: 2024-09-28

## 2024-09-25 RX ORDER — FAMOTIDINE 20 MG/1
20 TABLET, FILM COATED ORAL 2 TIMES DAILY PRN
Status: DISCONTINUED | OUTPATIENT
Start: 2024-09-25 | End: 2024-09-30 | Stop reason: HOSPADM

## 2024-09-25 RX ORDER — BENZTROPINE MESYLATE 1 MG/ML
1 INJECTION, SOLUTION INTRAMUSCULAR; INTRAVENOUS ONCE AS NEEDED
Status: DISCONTINUED | OUTPATIENT
Start: 2024-09-25 | End: 2024-09-30 | Stop reason: HOSPADM

## 2024-09-25 RX ORDER — POLYETHYLENE GLYCOL 3350 17 G/17G
17 POWDER, FOR SOLUTION ORAL DAILY PRN
Status: DISCONTINUED | OUTPATIENT
Start: 2024-09-25 | End: 2024-09-30 | Stop reason: HOSPADM

## 2024-09-25 RX ORDER — NICOTINE POLACRILEX 4 MG
15 LOZENGE BUCCAL
Status: DISCONTINUED | OUTPATIENT
Start: 2024-09-25 | End: 2024-09-27

## 2024-09-25 RX ORDER — HYDROXYZINE HYDROCHLORIDE 50 MG/1
50 TABLET, FILM COATED ORAL EVERY 6 HOURS PRN
Status: DISCONTINUED | OUTPATIENT
Start: 2024-09-25 | End: 2024-09-30 | Stop reason: HOSPADM

## 2024-09-25 RX ORDER — TRAZODONE HYDROCHLORIDE 50 MG/1
50 TABLET, FILM COATED ORAL NIGHTLY PRN
Status: DISCONTINUED | OUTPATIENT
Start: 2024-09-25 | End: 2024-09-30 | Stop reason: HOSPADM

## 2024-09-25 RX ORDER — LOPERAMIDE HCL 2 MG
2 CAPSULE ORAL
Status: DISCONTINUED | OUTPATIENT
Start: 2024-09-25 | End: 2024-09-30 | Stop reason: HOSPADM

## 2024-09-25 RX ORDER — GLUCAGON 1 MG/ML
1 KIT INJECTION
Status: DISCONTINUED | OUTPATIENT
Start: 2024-09-25 | End: 2024-09-27

## 2024-09-25 RX ORDER — ECHINACEA PURPUREA EXTRACT 125 MG
2 TABLET ORAL AS NEEDED
Status: DISCONTINUED | OUTPATIENT
Start: 2024-09-25 | End: 2024-09-30 | Stop reason: HOSPADM

## 2024-09-25 RX ORDER — ONDANSETRON 4 MG/1
4 TABLET, ORALLY DISINTEGRATING ORAL EVERY 6 HOURS PRN
Status: DISCONTINUED | OUTPATIENT
Start: 2024-09-25 | End: 2024-09-30 | Stop reason: HOSPADM

## 2024-09-25 RX ORDER — BENZTROPINE MESYLATE 1 MG/1
2 TABLET ORAL ONCE AS NEEDED
Status: DISCONTINUED | OUTPATIENT
Start: 2024-09-25 | End: 2024-09-30 | Stop reason: HOSPADM

## 2024-09-25 RX ORDER — INSULIN LISPRO 100 [IU]/ML
2-9 INJECTION, SOLUTION INTRAVENOUS; SUBCUTANEOUS
Status: DISCONTINUED | OUTPATIENT
Start: 2024-09-26 | End: 2024-09-27

## 2024-09-25 RX ORDER — BENZONATATE 100 MG/1
100 CAPSULE ORAL 3 TIMES DAILY PRN
Status: DISCONTINUED | OUTPATIENT
Start: 2024-09-25 | End: 2024-09-30 | Stop reason: HOSPADM

## 2024-09-25 RX ORDER — DEXTROSE MONOHYDRATE 25 G/50ML
25 INJECTION, SOLUTION INTRAVENOUS
Status: DISCONTINUED | OUTPATIENT
Start: 2024-09-25 | End: 2024-09-27

## 2024-09-25 RX ORDER — ALUMINA, MAGNESIA, AND SIMETHICONE 2400; 2400; 240 MG/30ML; MG/30ML; MG/30ML
15 SUSPENSION ORAL EVERY 6 HOURS PRN
Status: DISCONTINUED | OUTPATIENT
Start: 2024-09-25 | End: 2024-09-30 | Stop reason: HOSPADM

## 2024-09-25 RX ADMIN — IBUPROFEN 400 MG: 400 TABLET, FILM COATED ORAL at 23:45

## 2024-09-25 RX ADMIN — INSULIN HUMAN 6 UNITS: 100 INJECTION, SOLUTION PARENTERAL at 11:47

## 2024-09-25 RX ADMIN — TRAZODONE HYDROCHLORIDE 50 MG: 50 TABLET ORAL at 23:19

## 2024-09-25 NOTE — ED PROVIDER NOTES
Subjective   History of Present Illness  20 yo male pt with hx of Bipolar, depression, anxiety, DM, and HTN presents to the ED with complaints of suicidal ideations.  PT states that he has a plan to scratch his arm with his nails to kill himself. Pt states that he was just discharged to rehab. Pt states rehab is overwhelming.  He denies any other symptoms or concerns.     History provided by:  Patient   used: No        Review of Systems   Constitutional: Negative.    HENT: Negative.     Eyes: Negative.    Respiratory: Negative.     Cardiovascular: Negative.    Gastrointestinal: Negative.    Endocrine: Negative.    Genitourinary: Negative.    Musculoskeletal: Negative.    Skin: Negative.    Allergic/Immunologic: Negative.    Neurological: Negative.    Psychiatric/Behavioral:  Positive for suicidal ideas.    All other systems reviewed and are negative.      Past Medical History:   Diagnosis Date    Anxiety     Bipolar disorder     Depression     Diabetes     HTN (hypertension)     Suicidal behavior        No Known Allergies    History reviewed. No pertinent surgical history.    History reviewed. No pertinent family history.    Social History     Socioeconomic History    Marital status: Single    Years of education: 12    Highest education level: High school graduate   Tobacco Use    Smoking status: Never    Smokeless tobacco: Never   Vaping Use    Vaping status: Former    Substances: Nicotine    Devices: Disposable   Substance and Sexual Activity    Alcohol use: Not Currently    Drug use: Never    Sexual activity: Not Currently           Objective   Physical Exam  Vitals and nursing note reviewed.   Constitutional:       Appearance: Normal appearance. He is normal weight.   HENT:      Head: Normocephalic and atraumatic.      Right Ear: External ear normal.      Left Ear: External ear normal.      Nose: Nose normal.      Mouth/Throat:      Mouth: Mucous membranes are moist.      Pharynx: Oropharynx  is clear.   Eyes:      Extraocular Movements: Extraocular movements intact.      Conjunctiva/sclera: Conjunctivae normal.      Pupils: Pupils are equal, round, and reactive to light.   Cardiovascular:      Rate and Rhythm: Normal rate and regular rhythm.      Pulses: Normal pulses.      Heart sounds: Normal heart sounds.   Pulmonary:      Effort: Pulmonary effort is normal.      Breath sounds: Normal breath sounds.   Abdominal:      General: Abdomen is flat. Bowel sounds are normal.      Palpations: Abdomen is soft.   Musculoskeletal:         General: Normal range of motion.      Cervical back: Normal range of motion and neck supple.   Skin:     General: Skin is warm and dry.      Capillary Refill: Capillary refill takes less than 2 seconds.   Neurological:      General: No focal deficit present.      Mental Status: He is alert and oriented to person, place, and time. Mental status is at baseline.   Psychiatric:         Mood and Affect: Mood normal.         Behavior: Behavior normal.         Thought Content: Thought content normal. Thought content includes suicidal ideation. Thought content includes suicidal plan.         Judgment: Judgment normal.         Procedures           ED Course  ED Course as of 09/25/24 1222   Wed Sep 25, 2024   1147 Dr. Silver consulted.  He recommends dc home. He is very knowledgeable of the patient.  He recommends dc back to rehab. He states that the patient is situational.   [ML]   1219 Acetone: Negative [ML]   1219 Glucose(!!): 463 [ML]   1219 Anion Gap(!): 16.8 [ML]   1220 Vitals stable and WNL. Pt will f/u with PCP and therapist tomorrow.   [ML]      ED Course User Index  [ML] Ama Chappell PA                                   Results for orders placed or performed during the hospital encounter of 09/25/24   Comprehensive Metabolic Panel    Specimen: Blood   Result Value Ref Range    Glucose 463 (C) 65 - 99 mg/dL    BUN 11 6 - 20 mg/dL    Creatinine 0.78 0.76 - 1.27 mg/dL     Sodium 135 (L) 136 - 145 mmol/L    Potassium 4.4 3.5 - 5.2 mmol/L    Chloride 103 98 - 107 mmol/L    CO2 15.2 (L) 22.0 - 29.0 mmol/L    Calcium 9.2 8.6 - 10.5 mg/dL    Total Protein 7.4 6.0 - 8.5 g/dL    Albumin 3.8 3.5 - 5.2 g/dL    ALT (SGPT) 47 (H) 1 - 41 U/L    AST (SGOT) 43 (H) 1 - 40 U/L    Alkaline Phosphatase 142 (H) 39 - 117 U/L    Total Bilirubin 0.9 0.0 - 1.2 mg/dL    Globulin 3.6 gm/dL    A/G Ratio 1.1 g/dL    BUN/Creatinine Ratio 14.1 7.0 - 25.0    Anion Gap 16.8 (H) 5.0 - 15.0 mmol/L    eGFR 130.1 >60.0 mL/min/1.73   Urinalysis With Microscopic If Indicated (No Culture) - Urine, Clean Catch    Specimen: Urine, Clean Catch   Result Value Ref Range    Color, UA Yellow Yellow, Straw    Appearance, UA Clear Clear    pH, UA 6.5 5.0 - 8.0    Specific Gravity, UA >1.030 (H) 1.005 - 1.030    Glucose, UA >=1000 mg/dL (3+) (A) Negative    Ketones, UA Negative Negative    Bilirubin, UA Negative Negative    Blood, UA Negative Negative    Protein, UA Negative Negative    Leuk Esterase, UA Negative Negative    Nitrite, UA Negative Negative    Urobilinogen, UA 0.2 E.U./dL 0.2 - 1.0 E.U./dL   Urine Drug Screen - Urine, Clean Catch    Specimen: Urine, Clean Catch   Result Value Ref Range    THC, Screen, Urine Negative Negative    Phencyclidine (PCP), Urine Negative Negative    Cocaine Screen, Urine Negative Negative    Methamphetamine, Ur Negative Negative    Opiate Screen Negative Negative    Amphetamine Screen, Urine Negative Negative    Benzodiazepine Screen, Urine Negative Negative    Tricyclic Antidepressants Screen Negative Negative    Methadone Screen, Urine Negative Negative    Barbiturates Screen, Urine Negative Negative    Oxycodone Screen, Urine Negative Negative    Buprenorphine, Screen, Urine Negative Negative   Ethanol    Specimen: Blood   Result Value Ref Range    Ethanol <10 0 - 10 mg/dL    Ethanol % <0.010 %   Magnesium    Specimen: Blood   Result Value Ref Range    Magnesium 2.0 1.6 - 2.6 mg/dL   CBC  Auto Differential    Specimen: Blood   Result Value Ref Range    WBC 4.03 3.40 - 10.80 10*3/mm3    RBC 4.35 4.14 - 5.80 10*6/mm3    Hemoglobin 14.2 13.0 - 17.7 g/dL    Hematocrit 41.0 37.5 - 51.0 %    MCV 94.3 79.0 - 97.0 fL    MCH 32.6 26.6 - 33.0 pg    MCHC 34.6 31.5 - 35.7 g/dL    RDW 12.7 12.3 - 15.4 %    RDW-SD 43.8 37.0 - 54.0 fl    MPV 10.6 6.0 - 12.0 fL    Platelets 54 (L) 140 - 450 10*3/mm3    Neutrophil % 54.1 42.7 - 76.0 %    Lymphocyte % 33.3 19.6 - 45.3 %    Monocyte % 10.2 5.0 - 12.0 %    Eosinophil % 2.0 0.3 - 6.2 %    Basophil % 0.2 0.0 - 1.5 %    Immature Grans % 0.2 0.0 - 0.5 %    Neutrophils, Absolute 2.18 1.70 - 7.00 10*3/mm3    Lymphocytes, Absolute 1.34 0.70 - 3.10 10*3/mm3    Monocytes, Absolute 0.41 0.10 - 0.90 10*3/mm3    Eosinophils, Absolute 0.08 0.00 - 0.40 10*3/mm3    Basophils, Absolute 0.01 0.00 - 0.20 10*3/mm3    Immature Grans, Absolute 0.01 0.00 - 0.05 10*3/mm3    nRBC 0.0 0.0 - 0.2 /100 WBC   Fentanyl, Urine - Urine, Clean Catch    Specimen: Urine, Clean Catch   Result Value Ref Range    Fentanyl, Urine Negative Negative   Scan Slide    Specimen: Blood   Result Value Ref Range    RBC Morphology Normal Normal    Clumped Platelets Present None Seen   Acetone    Specimen: Blood   Result Value Ref Range    Acetone Negative Negative               Medical Decision Making  18 yo male pt with hx of Bipolar, depression, anxiety, DM, and HTN presents to the ED with complaints of suicidal ideations.  PT states that he has a plan to scratch his arm with his nails to kill himself. Pt states that he was just discharged to rehab. Pt states rehab is overwhelming.  He denies any other symptoms or concerns.  ED stay uncomplicated. Vital stable and WNL.  Dr. Silver consulted and recommends dc home.  Pt will f/u with outpatient resources.  Discussed sx and red flags that would warrant return to the ED.     Problems Addressed:  Stress reaction: complicated acute illness or injury    Amount and/or  Complexity of Data Reviewed  Labs: ordered. Decision-making details documented in ED Course.    Risk  OTC drugs.        Final diagnoses:   Stress reaction       ED Disposition  ED Disposition       ED Disposition   Discharge    Condition   Stable    Comment   --               [facility]  [address] [place] [address] [id number] [phone]  Schedule an appointment as soon as possible for a visit in 1 day           Medication List      No changes were made to your prescriptions during this visit.            Ama Chappell PA  09/25/24 [id number]

## 2024-09-25 NOTE — NURSING NOTE
Intake assessment completed. Pt came in from [facility] today for SI that started this morning. He states he plans to scratch himself with his nails. Pt was here for about a week and discharged 9/23/24 to [facility]. He states he is in rehab because he used to drink a jar of moonshine almost every day. He states he feels rehab is overwhelming. He hasn't had a drink in about 2 weeks. Denies any substance use other than prescribed mediations. Denies HI AVH. He rates anxiety 9/10 and depression 10/10. He reports eating and sleeping well.

## 2024-09-25 NOTE — NURSING NOTE
Called and spoke to Dr. Silver. Given assessment, labs, and clinicals. Instructed to discharge this pt with outpt follow up and resources. Dr. Silver is familiar with this pt and feels this is a situational reaction to the rehab. ER provider made aware. [id number]

## 2024-09-26 LAB
GLUCOSE BLDC GLUCOMTR-MCNC: 276 MG/DL (ref 70–130)
GLUCOSE BLDC GLUCOMTR-MCNC: 299 MG/DL (ref 70–130)
GLUCOSE BLDC GLUCOMTR-MCNC: 299 MG/DL (ref 70–130)
GLUCOSE BLDC GLUCOMTR-MCNC: 321 MG/DL (ref 70–130)
HAV IGM SERPL QL IA: NORMAL
HBV CORE IGM SERPL QL IA: NORMAL
HBV SURFACE AG SERPL QL IA: NORMAL
HCV AB SER QL: NORMAL
QT INTERVAL: 414 MS
QTC INTERVAL: 453 MS

## 2024-09-26 PROCEDURE — 82948 REAGENT STRIP/BLOOD GLUCOSE: CPT

## 2024-09-26 PROCEDURE — 63710000001 INSULIN GLARGINE PER 5 UNITS: Performed by: PSYCHIATRY & NEUROLOGY

## 2024-09-26 PROCEDURE — 93010 ELECTROCARDIOGRAM REPORT: CPT | Performed by: INTERNAL MEDICINE

## 2024-09-26 PROCEDURE — 99223 1ST HOSP IP/OBS HIGH 75: CPT | Performed by: PSYCHIATRY & NEUROLOGY

## 2024-09-26 PROCEDURE — 63710000001 INSULIN LISPRO (HUMAN) PER 5 UNITS: Performed by: PSYCHIATRY & NEUROLOGY

## 2024-09-26 RX ORDER — TRAZODONE HYDROCHLORIDE 50 MG/1
50 TABLET, FILM COATED ORAL NIGHTLY
Status: CANCELLED | OUTPATIENT
Start: 2024-09-26

## 2024-09-26 RX ORDER — LURASIDONE HYDROCHLORIDE 40 MG/1
20 TABLET, FILM COATED ORAL
Status: DISCONTINUED | OUTPATIENT
Start: 2024-09-26 | End: 2024-09-30 | Stop reason: HOSPADM

## 2024-09-26 RX ORDER — HYDROXYZINE HYDROCHLORIDE 25 MG/1
25 TABLET, FILM COATED ORAL EVERY 4 HOURS PRN
Status: CANCELLED | OUTPATIENT
Start: 2024-09-26

## 2024-09-26 RX ORDER — LAMOTRIGINE 100 MG/1
25 TABLET ORAL DAILY
Status: DISCONTINUED | OUTPATIENT
Start: 2024-09-26 | End: 2024-09-30 | Stop reason: HOSPADM

## 2024-09-26 RX ADMIN — METFORMIN HYDROCHLORIDE 500 MG: 500 TABLET ORAL at 17:11

## 2024-09-26 RX ADMIN — INSULIN LISPRO 6 UNITS: 100 INJECTION, SOLUTION INTRAVENOUS; SUBCUTANEOUS at 07:13

## 2024-09-26 RX ADMIN — TRAZODONE HYDROCHLORIDE 50 MG: 50 TABLET ORAL at 23:27

## 2024-09-26 RX ADMIN — INSULIN LISPRO 6 UNITS: 100 INJECTION, SOLUTION INTRAVENOUS; SUBCUTANEOUS at 11:39

## 2024-09-26 RX ADMIN — INSULIN GLARGINE 30 UNITS: 100 INJECTION, SOLUTION SUBCUTANEOUS at 20:55

## 2024-09-26 RX ADMIN — METFORMIN HYDROCHLORIDE 500 MG: 500 TABLET ORAL at 08:16

## 2024-09-26 RX ADMIN — LAMOTRIGINE 25 MG: 100 TABLET ORAL at 08:16

## 2024-09-26 RX ADMIN — HYDROXYZINE HYDROCHLORIDE 50 MG: 50 TABLET, FILM COATED ORAL at 14:47

## 2024-09-26 RX ADMIN — INSULIN LISPRO 6 UNITS: 100 INJECTION, SOLUTION INTRAVENOUS; SUBCUTANEOUS at 20:55

## 2024-09-26 RX ADMIN — INSULIN GLARGINE 30 UNITS: 100 INJECTION, SOLUTION SUBCUTANEOUS at 08:16

## 2024-09-26 RX ADMIN — INSULIN LISPRO 7 UNITS: 100 INJECTION, SOLUTION INTRAVENOUS; SUBCUTANEOUS at 16:13

## 2024-09-26 RX ADMIN — IBUPROFEN 400 MG: 400 TABLET, FILM COATED ORAL at 14:47

## 2024-09-26 RX ADMIN — LURASIDONE HYDROCHLORIDE 20 MG: 40 TABLET, FILM COATED ORAL at 17:11

## 2024-09-26 NOTE — PAYOR COMM NOTE
"Ajay Gonzales (19 y.o. Male)       Date of Birth   2005    Social Security Number       Address   [address] [facility] [address]    Home Phone   [phone]    MRN   [id number]       Jain   Orthodoxy    Marital Status   Single                            Admission Date   9/25/24    Admission Type   Emergency    Admitting Provider   Brent Galindo MD    Attending Provider   Ashley Thomas MD    Department, Room/Bed   [facility], 1018/2S       Discharge Date       Discharge Disposition       Discharge Destination                                 Attending Provider: Ashley Thomas MD    Allergies: No Known Allergies    Isolation: None   Infection: None   Code Status: CPR    Ht: 175.3 cm (69\")   Wt: 141 kg (311 lb)    Admission Cmt: None   Principal Problem: Suicidal behavior [R45.89]                   Active Insurance as of 9/25/2024       Primary Coverage       Payor Plan Insurance Group Employer/Plan Group    [employer] [place] [employer] [place]       Payor Plan Address Payor Plan Phone Number Payor Plan Fax Number Effective Dates    [address] [phone]  4/1/2024 - None Entered    [facility] [address]         Subscriber Name Subscriber Birth Date Member ID       AJAY GONZALES 2005 [id number]               Secondary Coverage       Payor Plan Insurance Group Employer/Plan Group    [place] [id number]       Payor Plan Address Payor Plan Phone Number Payor Plan Fax Number Effective Dates    [employer] [address] [address] [phone]  9/1/2024 - None Entered    [facility] [id number]         Subscriber Name Subscriber Birth Date Member ID       AJAY GONZALES 2005 [id number]                     Emergency Contacts        (Rel.) Home Phone Work Phone Mobile Phone    [facility] (Other) [phone] -- --          PLEASE ATTACH THIS INITIAL AUTHORIZATION REQUEST TO THE PENDING " "AUTHORIZATION # [id number]  PLEASE NOTE:  THIS IS THE SECOND REQUEST FAXED.  INITIAL REQUEST WAS FAXED ON 2024.    RETURN FAX:  [phone]    RE:  RAMIRO GONZALES  :  2005  PLEASE SEE DEMOGRAPHICS FOR ADDITIONAL INFORMATION    ADMISSION DATE:  2024 AT 2250 EST    FACILITY:  [facility]  NPI:  [id number]   TAX ID:  [id number]  ADDRESS: [address] [place] [address]  ATTENDING MD:  DR AVEL MUNIZ  NPI:  [id number]  ADDRESS IS THE SAME AS FACILITY      UR CONTACT:  NAOMI SOLIMAN  PHONE:  [phone]  FAX:  [phone]    PRIMARY DIAGNOSIS:  Bipolar II disorder (F31.81)  ESTIMATED LENGTH OF STAY IS 5-7 DAYS    INTAKE:     Theo Collins RN   Registered Nurse     Nursing Note      Signed     Date of Service: 24  Creation Time: 24   Related encounter: ED from 2024 in [facility] EMERGENCY DEPARTMENT with Justus Lopez DO and Von Levi MD     Signed         Pt was earlier today and discharged from intake. He stated that he was told if his feelings of suicide increased to come back. Pt presents to intake currently with increased feelings of suicidal ideation with a plan to cut his wrists. When asked if he had done anything to injure himself today he stated \"I thought about scratching my arm to death\".      Pt rates both depression and anxiety 10/10.      Pt labs Creatine 0.70   Glucose 383  Alkaline phosphate 135  ALT 48  [id number]     ETOH and tox screen are both negative                  Theo Collins RN   Registered Nurse     Nursing Note      Signed     Date of Service: 24  Creation Time: 24   Related encounter: ED from 2024 in [facility] EMERGENCY DEPARTMENT with Justus Lopez DO and Von Levi MD     Signed         Pt was earlier today and discharged from intake. He stated that he was told if his feelings of suicide increased to come back. Pt presents to intake " "currently with increased feelings of suicidal ideation with a plan to cut his wrists. When asked if he had done anything to injure himself today he stated \"I thought about scratching my arm to death\".      Pt rates both depression and anxiety 10/10.      Pt labs Creatine 0.70   Glucose 383  Alkaline phosphate 135  ALT 48  [id number]     ETOH and tox screen are both negative              H&P:      Ashley Thomas MD   Physician  Psychiatry     H&P      Signed     Date of Service: 24  Creation Time: 24     Signed       Expand All Collapse All    INITIAL PSYCHIATRIC HISTORY & PHYSICAL     Patient Identification:  Name:   Ajay Xie  Age:   19 y.o.  Sex:   male  :   2005  MRN:   [id number]  Visit Number:   [id number]  Primary Care Physician:   Provider, No Known     SUBJECTIVE     CC/Focus of Exam: Depression     HPI: Ajay Xie is a 19 y.o. male who was admitted on 2024 with complaints of depression.  Patient states he came from [facility] [address] because he was having suicidal ideation.  Patient states he plans to cut his wrist.  Patient states that he thought about scratching himself with his nails.  Patient states he was here for a week and discharged on 2024.  He states he feels that he left too early. Patient states he feels overwhelmed and depressed.  Patient denies any substance abuse.  Patient denies any alcohol abuse but states he has drunk a quart of moonshine in the past.  Patient states he has been clean for 1 week.  Patient denies any tobacco use.  Patient states life in general as a stressor in his life.  Patient denies any history of physical, mental, or sexual abuse.  Patient rates his appetite as good.  Patient rates his sleep is good.  Patient denies any nightmares.  Patient rates his anxiety on a scale of 1-10 with 10 being the most severe a 9.  Patient rates his depression on a scale of 1-10 with 10 being the most severe a 10.  Patient states he has " suicidal ideation.  Patient denies any homicidal ideation.  Patient denies any hallucinations.  Patient was admitted to [facility] psychiatry for further safety and stabilization.     Available medical/psychiatric records reviewed and incorporated into the current document.      PAST PSYCHIATRIC HX: Patient has had 1 prior admission on 9- - 9-.  Patient states that he receives outpatient care in [place] but does not know the name of it.     SUBSTANCE USE HX: UDS was negative.  See HPI for current use.     SOCIAL HX: Patient states he was born and raised in [place] [facility] [place], [place].  Patient states he currently resides with his father in [facility] [place].  Patient states he is single and has no children.  Patient states he is currently unemployed.  Patient states he has a high school diploma.  Patient states he has legal issues.  Patient states he has court on October 24 for domestic violence.     Medical History        Past Medical History:   Diagnosis Date    Anxiety      Bipolar disorder      Depression      Diabetes      HTN (hypertension)      Suicidal behavior              Surgical History   History reviewed. No pertinent surgical history.        History reviewed. No pertinent family history.        Prescriptions Prior to Admission           Medications Prior to Admission   Medication Sig Dispense Refill Last Dose    hydrOXYzine (ATARAX) 25 MG tablet Take 1 tablet by mouth Every 4 (Four) Hours As Needed for Itching or Anxiety. Indications: Feeling Anxious          Insulin Glargine (Lantus SoloStar) 100 UNIT/ML injection pen Inject 30 Units under the skin into the appropriate area as directed 2 (Two) Times a Day. Indications: Type 2 Diabetes 15 mL 0 9/25/2024    lamoTRIgine (LaMICtal) 25 MG tablet Take 1 tablet by mouth Daily for 14 days, THEN 2 tablets Daily for 14 days. Indications: Manic-Depression 42 tablet 0 9/25/2024    Lurasidone HCl (LATUDA) 20 MG tablet tablet Take 1  tablet by mouth Daily With Dinner. Indications: MIXED BIPOLAR AFFECTIVE DISORDER 30 tablet 0 9/25/2024    metFORMIN (GLUCOPHAGE) 500 MG tablet Take 1 tablet by mouth 2 (Two) Times a Day With Meals. Indications: Type 2 Diabetes 60 tablet 0 9/25/2024    traZODone (DESYREL) 50 MG tablet Take 1 tablet by mouth Every Night. Indications: Trouble Sleeping 30 tablet 0 9/24/2024                  ALLERGIES:  Patient has no known allergies.     Temp:  [96.9 °F (36.1 °C)-98.1 °F (36.7 °C)] 97.5 °F (36.4 °C)  Heart Rate:  [62-96] 71  Resp:  [16-18] 18  BP: (123-148)/(67-91) 123/91     REVIEW OF SYSTEMS:  Review of Systems   Constitutional: Negative.    HENT: Negative.     Eyes: Negative.    Respiratory: Negative.     Cardiovascular: Negative.    Gastrointestinal: Negative.    Endocrine: Negative.    Genitourinary: Negative.    Musculoskeletal: Negative.    Skin: Negative.    Allergic/Immunologic: Negative.    Neurological: Negative.    Hematological: Negative.    Psychiatric/Behavioral:  Positive for dysphoric mood and suicidal ideas. The patient is nervous/anxious.       See HPI for psychiatric ROS  OBJECTIVE    PHYSICAL EXAM:  Physical Exam  Constitutional:  Appears well-developed and well-nourished.   HENT:   Head: Normocephalic and atraumatic.   Right Ear: External ear normal.   Left Ear: External ear normal.   Mouth/Throat: Oropharynx is clear and moist.   Eyes: Pupils are equal, round, and reactive to light. Conjunctivae and EOM are normal.   Neck: Normal range of motion. Neck supple.   Cardiovascular: Normal rate, regular rhythm and normal heart sounds.    Respiratory: Effort normal and breath sounds normal. No respiratory distress. No wheezes.   GI: Soft. Bowel sounds are normal.No distension. There is no tenderness.   Musculoskeletal: Normal range of motion. No edema or deformity.   Neurological:  Cranial Nerves: I. No anosmia. II: No visual disturbance. III, IV VI: EOMI, PERRLA. V: Corneal reflext intact, no abnormal  sensations. VII: No facial palsy, or altered sensation. VIII: Hearing intact, balance intact. IX: Intact ah reflex. X: Normal phonation, swallowing. XI: Normal shrug and head movement. XII: Intact tongue movements  Coordination normal. No lateralizing signs.  Skin: Skin is warm and dry. No rash noted. No erythema.      MENTAL STATUS EXAM:   Hygiene:   fair  Cooperation:  Cooperative  Eye Contact:  Good  Psychomotor Behavior:  Appropriate  Affect:  Appropriate  Hopelessness: 6  Speech:  Normal  Linear  Thought Content:  Normal  Suicidal:  Suicidal Ideation  Homicidal:  None  Hallucinations:  None  Delusion:  None  Memory:  Intact  Orientation:  Person, Place, Time, and Situation  Reliability:  fair  Insight:  Fair  Judgement:  Poor  Impulse Control:  Poor        Imaging Results (Last 24 Hours)         ** No results found for the last 24 hours. **                ECG/EMG Results (most recent)         Procedure Component Value Units Date/Time     ECG 12 Lead Other; Baseline Cardiac Status [[id number]] Collected: 09/26/24 0113       Updated: 09/26/24 0127       QT Interval 414 ms         QTC Interval 453 ms       Narrative:       Test Reason : Other~  Blood Pressure :   */*   mmHG  Vent. Rate :  72 BPM     Atrial Rate :  72 BPM     P-R Int : 152 ms          QRS Dur : 112 ms      QT Int : 414 ms       P-R-T Axes :  26   6  32 degrees     QTc Int : 453 ms     Normal sinus rhythm  Normal ECG  When compared with ECG of 26-SEP-2024 01:13, (Unconfirmed)  Previous ECG has undetermined rhythm, needs review     Referred By:            Confirmed By:                       Lab Results   Component Value Date     GLUCOSE 383 (H) 09/25/2024     BUN 11 09/25/2024     CREATININE 0.70 (L) 09/25/2024     BCR 15.7 09/25/2024     CO2 21.2 (L) 09/25/2024     CALCIUM 9.4 09/25/2024     ALBUMIN 4.3 09/25/2024     AST 38 09/25/2024     ALT 48 (H) 09/25/2024               Lab Results   Component Value Date     WBC 4.34 09/25/2024     HGB 14.1  09/25/2024     HCT 39.8 09/25/2024     MCV 90.9 09/25/2024      (L) 09/25/2024         Pain Management Panel  More data may exist              Latest Ref Rng & Units 9/25/2024 9/17/2024   Pain Management Panel   Amphetamine, Urine Qual Negative Negative  Negative  Negative    Barbiturates Screen, Urine Negative Negative  Negative  Negative    Benzodiazepine Screen, Urine Negative Negative  Negative  Negative    Buprenorphine, Screen, Urine Negative Negative  Negative  Negative    Cocaine Screen, Urine Negative Negative  Negative  Negative    Fentanyl, Urine Negative Negative  Negative  Negative    Methadone Screen , Urine Negative Negative  Negative  Negative    Methamphetamine, Ur Negative Negative  Negative  Negative         Details            Multiple values from one day are sorted in reverse-chronological order                      Brief Urine Lab Results  (Last result in the past 365 days)          Color   Clarity   Blood   Leuk Est   Nitrite   Protein   CREAT   Urine HCG         09/25/24 2048 Yellow    Clear    Negative    Negative    Negative    Trace                             DATA  Labs reviewed. Creatinine 0.7, Glucose 299, Alk phos 135, ALT 48. Platelets 112. UA shows glucose >= 1000, trace protein. UDS negative  EKG reviewed. QTc 453 ms   YASH reviewed.   Record reviewed. The patient was last here from 9/18/24 to 9/23/24 with a similar presentation.         ASSESSMENT & PLAN:       Suicidal behavior  -SP 3       Bipolar II disorder  -Continue lurasidone  -Continue lamotrigine       Alcohol use disorder, severe, dependence  -Patient reports no use in more than a week. Monitor for withdrawals       DM (diabetes mellitus), type 2  -Insulin glargine 30 U bid  -Sliding scale coverage     Hospital bed: No     The patient has been admitted for safety and stabilization.  Patient will be monitored for suicidality daily and maintained on Special Precautions Level 3 (q15 min checks) .  The patient will  have individual and group therapy with a master's level therapist. A master treatment plan will be developed and agreed upon by the patient and his/her treatment team.  The patient's estimated length of stay in the hospital is 5-7 days.         Written by Chio Faustin acting as scribe for Dr.Mazhar Thomas signature on this note affirms that the note adequately documents the care provided.   This note was generated using a scribe,   Chio Faustin MA  09/26/24  8:17 AM EDT     IAshley MD, personally performed the services described in this documentation as scribed by the above named individual in my presence, and it is both accurate and complete.                   Revision History       NURSING ASSESSMENT: 9/25/24  Psych [employer]    Go to now       Monday 0700 - Yesterday 2051           24 Hrs 8 Hrs 4 Hrs 1 Hr  All   [facility] EMERGENCY DEPARTMENT  [facility] EMERGENCY DEPARTMENT  [facility] EMERGENCY DEPARTMENT    09/25 0700 - 09/26 0659    Time:  1027 1055 1058 1122 1225 2051        Pain/Comfort/Sleep    (0-10) Pain Rating: Rest  0    0   (0-10) Pain Rating: Rest     Behavioral    General Appearance WDL    WDL     General Appearance WDL     General Appearance WDL    WDL     General Appearance WDL     Emotion/Mood/Affect WDL    WDL     Emotion/Mood/Affect WDL     Patient rated anxiety level    9     Patient rated anxiety level     Patient rated depression level    10     Patient rated depression level     Speech WDL    WDL     Speech WDL     Perceptual State WDL    WDL     Perceptual State WDL     Thought Process WDL    thought duncan...     Thought Process WDL     Thought Content    suicidal thou...     Thought Content     Respiratory    Device (Oxygen Therapy)     room air  room air  Device (Oxygen Therapy)     Drains/Tubes    Additional Documentation         Additional Documentation     Access/Monitoring Devices    Additional Documentation         Additional  Documentation     Safety    Q1 Wished to be Dead (Past Month)  yes yes      Q1 Wished to be Dead (Past Month)     Q2 Suicidal Thoughts (Past Month)  yes yes      Q2 Suicidal Thoughts (Past Month)     Q3 Suicidal Thought Method  yes yes      Q3 Suicidal Thought Method     Q4 Suicidal Intent without Specific Plan  no no      Q4 Suicidal Intent without Specific Plan     Q5 Suicide Intent with Specific Plan  yes yes      Q5 Suicide Intent with Specific Plan     Q6 Suicide Behavior (Lifetime)  no no      Q6 Suicide Behavior (Lifetime)     Level of Risk per Screen  high risk high risk      Level of Risk per Screen

## 2024-09-26 NOTE — DISCHARGE INSTR - APPOINTMENTS
[address]  [facility] [address] [address] [phone]    Return at discharge.

## 2024-09-26 NOTE — PLAN OF CARE
Goal Outcome Evaluation:        Problem: Adult Behavioral Health Plan of Care  Goal: Patient-Specific Goal (Individualization)  Outcome: Progressing  Flowsheets  Taken 9/26/2024 1114  Patient-Specific Goals (Include Timeframe): Patient will identify 2-3 coping skills, complete aftercare plans, address relapse prevention methods, and deny SI/HI prior to discharge in 1-7 days. Patient's long term goals are to complete residential rehab program and potentially go back to school.  Individualized Care Needs: Therapist to offer 1-4 therapy sessions, aftercare planning, safety planning, group therapy, family education, and brief CBT/MI interventions.  Anxieties, Fears or Concerns: none verbalized  Taken 9/26/2024 1056  Patient Personal Strengths:   resourceful   resilient   motivated for treatment   motivated for recovery   positive educational history   spiritual/Restorationism support  Patient Vulnerabilities:   substance abuse/addiction   history of unsuccessful treatment   poor impulse control   occupational insecurity   limited support system   legal concerns   lacks insight into illness  Goal: Optimized Coping Skills in Response to Life Stressors  Outcome: Progressing  Flowsheets (Taken 9/26/2024 1114)  Optimized Coping Skills in Response to Life Stressors: making progress toward outcome  Intervention: Promote Effective Coping Strategies  Flowsheets (Taken 9/26/2024 1114)  Supportive Measures:   active listening utilized   counseling provided   decision-making supported   goal-setting facilitated   verbalization of feelings encouraged   self-responsibility promoted   positive reinforcement provided   self-reflection promoted   self-care encouraged  Goal: Develops/Participates in Therapeutic [place] to Support Successful Transition  Outcome: Progressing  Flowsheets (Taken 9/26/2024 1114)  Develops/Participates in Therapeutic [place] to Support Successful Transition: making progress toward outcome  Intervention: Foster  Therapeutic [place]  Flowsheets (Taken 9/26/2024 0730 by Rola Dixon RN)  Trust Relationship/Rapport:   thoughts/feelings acknowledged   reassurance provided   questions encouraged   questions answered   empathic listening provided   emotional support provided   choices provided   care explained  Intervention: Mutually Develop Transition Plan  Flowsheets  Taken 9/26/2024 1114  Outpatient/Agency/Support Group Needs: residential services  Transition Support:   follow-up care discussed   community resources reviewed   crisis management plan promoted   crisis management plan verbalized   follow-up care coordinated  Anticipated Discharge Disposition: residential substance use unit  Taken 9/26/2024 1112  Discharge Coordination/Progress: Patient has [employer] [facility] [facility] [place]. Therapist met with patient to complete assessment. Patient presents from [place].  Transportation Anticipated: agency  Transportation Concerns: no car  Current Discharge Risk:   substance use/abuse   psychiatric illness  Concerns to be Addressed:   substance/tobacco abuse/use   discharge planning   mental health   cognitive/perceptual   coping/stress   medication   suicidal  Readmission Within the Last 30 Days: no previous admission in last 30 days  Patient/Family Anticipated Services at Transition: rehabilitation services  Patient's Choice of Community Agency(s): [address] [address]  Patient/Family Anticipates Transition to: inpatient rehabilitation facility  Offered/Gave Vendor List: no      DATA:      Therapist met individually with patient this date to introduce role and to discuss hospitalization expectations. Patient agreeable.     Patient signed consent for his grandmother [employer]lla and for [employer].    Therapist spoke with [address] with patient present, confirmed he can return at discharge.      Clinical Maneuvering/Intervention:     Therapist assisted patient in processing session content; acknowledged and  "normalized patient’s thoughts, feelings, and concerns. Discussed the therapist/patient relationship and explain the parameters and limitations of relative confidentiality. Also discussed the importance of active participation, and honesty to the treatment process. Encouraged the patient to discuss/vent their feelings, frustrations, and fears concerning their ongoing medical issues and validated their feelings.     Discussed the importance of finding enjoyable activities and coping skills that the patient can engage in a regular basis. Discussed healthy coping skills such as distraction, self love, grounding, thought challenges/reframing, etc. Provided patient with list of healthy coping skills this date. Discussed the importance of medication compliance. Praised the patient for seeking help and spent the majority of the session building rapport.       Allowed patient to freely discuss issues without interruption or judgment. Provided safe, confidential environment to facilitate the development of positive therapeutic relationship and encourage open, honest communication.      Therapist addressed discharge safety planning this date. Assisted patient in identifying risk factors which would indicate the need for higher level of care after discharge; including thoughts to harm self or others and/or self-harming behavior. Encouraged patient to call 911, or present to the nearest emergency room should any of these events occur. Discussed crisis intervention services and means to access. Encouraged securing any objects of harm.       Therapist completed integrated summary, treatment plan, and initiated social history this date. Therapist is strongly encouraging family involvement in treatment.       ASSESSMENT:      The patient is a 20 y/o male admitted for SI with plan to scratch his arm. Patient reports high anxiety and depression, denies HI/AVH. When asked how he was feeling patient states \"suicidal\" but would not " elaborate on anything that was bothering him. Patient denies having any specific stressors going on besides feeling overwhelmed with his rehab treatment. Patient denies anything specific happening at the rehab to cause this. Patient's last [facility] admission was on 9/18/2024. Patient presents from [facility] and plans to return when stable, agency to provide transportation. Patient is agreeable to his grandmother being involved in his treatment. He denies having any additional questions or concerns.      PLAN:       Patient to remain hospitalized this date.     Treatment team will focus efforts on stabilizing patient's acute symptoms while providing education on healthy coping and crisis management to reduce hospitalizations. Patient requires daily psychiatrist evaluation and 24/7 nursing supervision to promote patient safety.     Therapist will offer 1-4 individual sessions, 1 therapy group daily, family education, and appropriate referral.    Patient to return to [facility] at discharge.

## 2024-09-26 NOTE — NURSING NOTE
"Patient exit therapy group- to room- Patient sits on bed begins hitting head backward on wall. Diego Mht follows patient into room attempt to redirect patient. Patient refused to talk to mht. This nurse and Ama therapist present in attempt to calm patient.  Pt states  \" I just feel left out and alone.\"  Encouraging patient to practice deep breathing exercises. Staff remain  with the patient to offer reassurance and support.    Patient appears calmer.  Patient reports feeling more in control and rates anxiety at 5 out of 10. Offered prn medication at this time patient agreeable.  Will continue to monitor and provide support as needed.  "

## 2024-09-26 NOTE — NURSING NOTE
Pt brought to intake and searched per hospital policy by two staff members. All pt belongings cataloged and locked in cabinet. Pt changed to safety scrubs.

## 2024-09-26 NOTE — PROGRESS NOTES
"1447:     Patient had left group early and went to his room and began hitting his head backward on the wall.  RN staff, [facility] Staff and security were present to redirect patient, give support, and ensure safety. Patient was able to calm down.  Therapist checked on patient 1-1 following this incident. He reports that nothing in group triggered him, but that he feels excluded by his peers here. He reports belief that they do not like him, but was unable to identify any evidence of this. He reports, \"It's just a feeling.\"  Therapist provided emotional support and identified that we were talking about this very topic in group and that it would benefit him from attending group to learn more.  Patient receptive.  Therapist encouraged patient to consider that feelings are not facts and encouraged him to continue taking a part in groups and socialization in the day room. Patient agreeable. Patient began to smile, joke, and appeared easily redirectable. He denied other issues and stated that he would work on redirecting negative thoughts. He reported that he would talk to staff if he felt like this again.    "

## 2024-09-26 NOTE — H&P
INITIAL PSYCHIATRIC HISTORY & PHYSICAL    Patient Identification:  Name:   Ajay Xie  Age:   19 y.o.  Sex:   male  :   2005  MRN:   [id number]  Visit Number:   [id number]  Primary Care Physician:   Provider, No Known    SUBJECTIVE    CC/Focus of Exam: Depression    HPI: Ajay Xie is a 19 y.o. male who was admitted on 2024 with complaints of depression.  Patient states he came from [facility] because he was having suicidal ideation.  Patient states he plans to cut his wrist.  Patient states that he thought about scratching himself with his nails.  Patient states he was here for a week and discharged on 2024.  He states he feels that he left too early. Patient states he feels overwhelmed and depressed.  Patient denies any substance abuse.  Patient denies any alcohol abuse but states he has drunk a quart of moonshine in the past.  Patient states he has been clean for 1 week.  Patient denies any tobacco use.  Patient states life in general as a stressor in his life.  Patient denies any history of physical, mental, or sexual abuse.  Patient rates his appetite as good.  Patient rates his sleep is good.  Patient denies any nightmares.  Patient rates his anxiety on a scale of 1-10 with 10 being the most severe a 9.  Patient rates his depression on a scale of 1-10 with 10 being the most severe a 10.  Patient states he has suicidal ideation.  Patient denies any homicidal ideation.  Patient denies any hallucinations.  Patient was admitted to [facility] psychiatry for further safety and stabilization.    Available medical/psychiatric records reviewed and incorporated into the current document.     PAST PSYCHIATRIC HX: Patient has had 1 prior admission on 2024 - 2024.  Patient states that he receives outpatient care in [place] but does not know the name of it.    SUBSTANCE USE HX: UDS was negative.  See HPI for current use.    SOCIAL HX: Patient states he was born and  raised in [place] [facility] [place] [facility].  Patient states he currently resides with his father in [facility].  Patient states he is single and has no children.  Patient states he is currently unemployed.  Patient states he has a high school diploma.  Patient states he has legal issues.  Patient states he has court on October 24 for domestic violence.    Past Medical History:   Diagnosis Date    Anxiety     Bipolar disorder     Depression     Diabetes     HTN (hypertension)     Suicidal behavior        History reviewed. No pertinent surgical history.    History reviewed. No pertinent family history.      Medications Prior to Admission   Medication Sig Dispense Refill Last Dose    hydrOXYzine (ATARAX) 25 MG tablet Take 1 tablet by mouth Every 4 (Four) Hours As Needed for Itching or Anxiety. Indications: Feeling Anxious       Insulin Glargine (Lantus SoloStar) 100 UNIT/ML injection pen Inject 30 Units under the skin into the appropriate area as directed 2 (Two) Times a Day. Indications: Type 2 Diabetes 15 mL 0 9/25/2024    lamoTRIgine (LaMICtal) 25 MG tablet Take 1 tablet by mouth Daily for 14 days, THEN 2 tablets Daily for 14 days. Indications: Manic-Depression 42 tablet 0 9/25/2024    Lurasidone HCl (LATUDA) 20 MG tablet tablet Take 1 tablet by mouth Daily With Dinner. Indications: MIXED BIPOLAR AFFECTIVE DISORDER 30 tablet 0 9/25/2024    metFORMIN (GLUCOPHAGE) 500 MG tablet Take 1 tablet by mouth 2 (Two) Times a Day With Meals. Indications: Type 2 Diabetes 60 tablet 0 9/25/2024    traZODone (DESYREL) 50 MG tablet Take 1 tablet by mouth Every Night. Indications: Trouble Sleeping 30 tablet 0 9/24/2024           ALLERGIES:  Patient has no known allergies.    Temp:  [96.9 °F (36.1 °C)-98.1 °F (36.7 °C)] 97.5 °F (36.4 °C)  Heart Rate:  [62-96] 71  Resp:  [16-18] 18  BP: (123-148)/(67-91) 123/91    REVIEW OF SYSTEMS:  Review of Systems   Constitutional: Negative.    HENT: Negative.     Eyes: Negative.    Respiratory:  Negative.     Cardiovascular: Negative.    Gastrointestinal: Negative.    Endocrine: Negative.    Genitourinary: Negative.    Musculoskeletal: Negative.    Skin: Negative.    Allergic/Immunologic: Negative.    Neurological: Negative.    Hematological: Negative.    Psychiatric/Behavioral:  Positive for dysphoric mood and suicidal ideas. The patient is nervous/anxious.       See HPI for psychiatric ROS  OBJECTIVE    PHYSICAL EXAM:  Physical Exam  Constitutional:  Appears well-developed and well-nourished.   HENT:   Head: Normocephalic and atraumatic.   Right Ear: External ear normal.   Left Ear: External ear normal.   Mouth/Throat: Oropharynx is clear and moist.   Eyes: Pupils are equal, round, and reactive to light. Conjunctivae and EOM are normal.   Neck: Normal range of motion. Neck supple.   Cardiovascular: Normal rate, regular rhythm and normal heart sounds.    Respiratory: Effort normal and breath sounds normal. No respiratory distress. No wheezes.   GI: Soft. Bowel sounds are normal.No distension. There is no tenderness.   Musculoskeletal: Normal range of motion. No edema or deformity.   Neurological:  Cranial Nerves: I. No anosmia. II: No visual disturbance. III, IV VI: EOMI, PERRLA. V: Corneal reflext intact, no abnormal sensations. VII: No facial palsy, or altered sensation. VIII: Hearing intact, balance intact. IX: Intact ah reflex. X: Normal phonation, swallowing. XI: Normal shrug and head movement. XII: Intact tongue movements  Coordination normal. No lateralizing signs.  Skin: Skin is warm and dry. No rash noted. No erythema.     MENTAL STATUS EXAM:   Hygiene:   fair  Cooperation:  Cooperative  Eye Contact:  Good  Psychomotor Behavior:  Appropriate  Affect:  Appropriate  Hopelessness: 6  Speech:  Normal  Linear  Thought Content:  Normal  Suicidal:  Suicidal Ideation  Homicidal:  None  Hallucinations:  None  Delusion:  None  Memory:  Intact  Orientation:  Person, Place, Time, and Situation  Reliability:   fair  Insight:  Fair  Judgement:  Poor  Impulse Control:  Poor      Imaging Results (Last 24 Hours)       ** No results found for the last 24 hours. **             ECG/EMG Results (most recent)       Procedure Component Value Units Date/Time    ECG 12 Lead Other; Baseline Cardiac Status [[id number]] Collected: 09/26/24 0113     Updated: 09/26/24 0127     QT Interval 414 ms      QTC Interval 453 ms     Narrative:      Test Reason : Other~  Blood Pressure :   */*   mmHG  Vent. Rate :  72 BPM     Atrial Rate :  72 BPM     P-R Int : 152 ms          QRS Dur : 112 ms      QT Int : 414 ms       P-R-T Axes :  26   6  32 degrees     QTc Int : 453 ms    Normal sinus rhythm  Normal ECG  When compared with ECG of 26-SEP-2024 01:13, (Unconfirmed)  Previous ECG has undetermined rhythm, needs review    Referred By:            Confirmed By:              Lab Results   Component Value Date    GLUCOSE 383 (H) 09/25/2024    BUN 11 09/25/2024    CREATININE 0.70 (L) 09/25/2024    BCR 15.7 09/25/2024    CO2 21.2 (L) 09/25/2024    CALCIUM 9.4 09/25/2024    ALBUMIN 4.3 09/25/2024    AST 38 09/25/2024    ALT 48 (H) 09/25/2024       Lab Results   Component Value Date    WBC 4.34 09/25/2024    HGB 14.1 09/25/2024    HCT 39.8 09/25/2024    MCV 90.9 09/25/2024     (L) 09/25/2024       Pain Management Panel  More data may exist         Latest Ref Rng & Units 9/25/2024 9/17/2024   Pain Management Panel   Amphetamine, Urine Qual Negative Negative  Negative  Negative    Barbiturates Screen, Urine Negative Negative  Negative  Negative    Benzodiazepine Screen, Urine Negative Negative  Negative  Negative    Buprenorphine, Screen, Urine Negative Negative  Negative  Negative    Cocaine Screen, Urine Negative Negative  Negative  Negative    Fentanyl, Urine Negative Negative  Negative  Negative    Methadone Screen , Urine Negative Negative  Negative  Negative    Methamphetamine, Ur Negative Negative  Negative  Negative       Details           Multiple values from one day are sorted in reverse-chronological order               Brief Urine Lab Results  (Last result in the past 365 days)        Color   Clarity   Blood   Leuk Est   Nitrite   Protein   CREAT   Urine HCG        09/25/24 2048 Yellow   Clear   Negative   Negative   Negative   Trace                   DATA  Labs reviewed. Creatinine 0.7, Glucose 299, Alk phos 135, ALT 48. Platelets 112. UA shows glucose >= 1000, trace protein. UDS negative  EKG reviewed. QTc 453 ms   YASH reviewed.   Record reviewed. The patient was last here from 9/18/24 to 9/23/24 with a similar presentation.       ASSESSMENT & PLAN:      Suicidal behavior  -SP 3      Bipolar II disorder  -Continue lurasidone  -Continue lamotrigine      Alcohol use disorder, severe, dependence  -Patient reports no use in more than a week. Monitor for withdrawals      DM (diabetes mellitus), type 2  -Insulin glargine 30 U bid  -Sliding scale coverage    Hospital bed: No    The patient has been admitted for safety and stabilization.  Patient will be monitored for suicidality daily and maintained on Special Precautions Level 3 (q15 min checks) .  The patient will have individual and group therapy with a master's level therapist. A master treatment plan will be developed and agreed upon by the patient and his/her treatment team.  The patient's estimated length of stay in the hospital is 5-7 days.       Written by Chio Faustin acting as scribe for Dr.Mazhar Thomas signature on this note affirms that the note adequately documents the care provided.   This note was generated using a scribe,   Chio Faustin MA  09/26/24  8:17 AM EDT    IAshley MD, personally performed the services described in this documentation as scribed by the above named individual in my presence, and it is both accurate and complete.

## 2024-09-26 NOTE — NURSING NOTE
"Pt was earlier today and discharged from intake. He stated that he was told if his feelings of suicide increased to come back. Pt presents to intake currently with increased feelings of suicidal ideation with a plan to cut his wrists. When asked if he had done anything to injure himself today he stated \"I thought about scratching my arm to death\".     Pt rates both depression and anxiety 10/10.     Pt labs Creatine 0.70   Glucose 383  Alkaline phosphate 135  ALT 48  [id number]    ETOH and tox screen are both negative  "

## 2024-09-26 NOTE — NURSING NOTE
Spoke with Dr. Galindo about plan of care for patient. I advised Dr. Galindo of all labs and patient's suicidal thoughts with a plan. Dr. Galindo advised to admit patient on SP 3 with routine orders and medium dose sliding scale with accu checks.

## 2024-09-27 LAB
GLUCOSE BLDC GLUCOMTR-MCNC: 273 MG/DL (ref 70–130)
GLUCOSE BLDC GLUCOMTR-MCNC: 287 MG/DL (ref 70–130)
GLUCOSE BLDC GLUCOMTR-MCNC: 299 MG/DL (ref 70–130)
GLUCOSE BLDC GLUCOMTR-MCNC: 362 MG/DL (ref 70–130)
GLUCOSE BLDC GLUCOMTR-MCNC: 421 MG/DL (ref 70–130)
GLUCOSE BLDC GLUCOMTR-MCNC: 436 MG/DL (ref 70–130)
GLUCOSE BLDC GLUCOMTR-MCNC: 514 MG/DL (ref 70–130)

## 2024-09-27 PROCEDURE — 63710000001 INSULIN LISPRO (HUMAN) PER 5 UNITS: Performed by: PSYCHIATRY & NEUROLOGY

## 2024-09-27 PROCEDURE — 82948 REAGENT STRIP/BLOOD GLUCOSE: CPT

## 2024-09-27 PROCEDURE — 63710000001 INSULIN GLARGINE PER 5 UNITS: Performed by: PSYCHIATRY & NEUROLOGY

## 2024-09-27 PROCEDURE — 99232 SBSQ HOSP IP/OBS MODERATE 35: CPT | Performed by: PSYCHIATRY & NEUROLOGY

## 2024-09-27 RX ORDER — NICOTINE POLACRILEX 4 MG
15 LOZENGE BUCCAL
Status: DISCONTINUED | OUTPATIENT
Start: 2024-09-27 | End: 2024-09-30 | Stop reason: HOSPADM

## 2024-09-27 RX ORDER — GLUCAGON 1 MG/ML
1 KIT INJECTION
Status: DISCONTINUED | OUTPATIENT
Start: 2024-09-27 | End: 2024-09-30 | Stop reason: HOSPADM

## 2024-09-27 RX ORDER — INSULIN LISPRO 100 [IU]/ML
12 INJECTION, SOLUTION INTRAVENOUS; SUBCUTANEOUS ONCE
Status: COMPLETED | OUTPATIENT
Start: 2024-09-27 | End: 2024-09-27

## 2024-09-27 RX ORDER — DEXTROSE MONOHYDRATE 25 G/50ML
25 INJECTION, SOLUTION INTRAVENOUS
Status: DISCONTINUED | OUTPATIENT
Start: 2024-09-27 | End: 2024-09-30 | Stop reason: HOSPADM

## 2024-09-27 RX ORDER — INSULIN LISPRO 100 [IU]/ML
4-24 INJECTION, SOLUTION INTRAVENOUS; SUBCUTANEOUS
Status: DISCONTINUED | OUTPATIENT
Start: 2024-09-27 | End: 2024-09-30 | Stop reason: HOSPADM

## 2024-09-27 RX ADMIN — METFORMIN HYDROCHLORIDE 500 MG: 500 TABLET ORAL at 17:01

## 2024-09-27 RX ADMIN — TRAZODONE HYDROCHLORIDE 50 MG: 50 TABLET ORAL at 21:05

## 2024-09-27 RX ADMIN — INSULIN GLARGINE 35 UNITS: 100 INJECTION, SOLUTION SUBCUTANEOUS at 21:06

## 2024-09-27 RX ADMIN — LURASIDONE HYDROCHLORIDE 20 MG: 40 TABLET, FILM COATED ORAL at 17:01

## 2024-09-27 RX ADMIN — INSULIN LISPRO 12 UNITS: 100 INJECTION, SOLUTION INTRAVENOUS; SUBCUTANEOUS at 13:18

## 2024-09-27 RX ADMIN — INSULIN LISPRO 12 UNITS: 100 INJECTION, SOLUTION INTRAVENOUS; SUBCUTANEOUS at 16:30

## 2024-09-27 RX ADMIN — INSULIN LISPRO 12 UNITS: 100 INJECTION, SOLUTION INTRAVENOUS; SUBCUTANEOUS at 21:06

## 2024-09-27 RX ADMIN — IBUPROFEN 400 MG: 400 TABLET, FILM COATED ORAL at 10:22

## 2024-09-27 RX ADMIN — INSULIN LISPRO 12 UNITS: 100 INJECTION, SOLUTION INTRAVENOUS; SUBCUTANEOUS at 12:15

## 2024-09-27 RX ADMIN — LAMOTRIGINE 25 MG: 100 TABLET ORAL at 08:09

## 2024-09-27 RX ADMIN — INSULIN GLARGINE 30 UNITS: 100 INJECTION, SOLUTION SUBCUTANEOUS at 07:09

## 2024-09-27 RX ADMIN — INSULIN LISPRO 24 UNITS: 100 INJECTION, SOLUTION INTRAVENOUS; SUBCUTANEOUS at 11:06

## 2024-09-27 RX ADMIN — METFORMIN HYDROCHLORIDE 500 MG: 500 TABLET ORAL at 08:06

## 2024-09-27 RX ADMIN — INSULIN LISPRO 8 UNITS: 100 INJECTION, SOLUTION INTRAVENOUS; SUBCUTANEOUS at 07:10

## 2024-09-27 RX ADMIN — HYDROXYZINE HYDROCHLORIDE 50 MG: 50 TABLET, FILM COATED ORAL at 08:06

## 2024-09-27 NOTE — PLAN OF CARE
Goal Outcome Evaluation:  Plan of Care Reviewed With: patient  Patient Agreement with Plan of Care: agrees     Progress: improving  Outcome Evaluation: Patient cooperative, interacting with staff and peer. Patient does frequently seek staff, at times requires redirection, tolerates easily.  Patient denies suicidal or homicidal ideation

## 2024-09-27 NOTE — NURSING NOTE
MD made aware of pt's first glucose level of 578 and repeat of 514. Md to change sliding scale insulin.

## 2024-09-27 NOTE — NURSING NOTE
Pt blood sugar 1 hour follow up is 421. Md made aware with new order for humalog 12 units sq x1 dose now. Rbvox2. Recheck sugar in 1 hour

## 2024-09-27 NOTE — NURSING NOTE
Pt follow up blood glucose 436. Dr wheeler on unit and aware. New order Humalog 12 units sq now x1 dose. Rbvox2

## 2024-09-27 NOTE — NURSING NOTE
Patient with follow up BGL of 436, per Peg GRANADOS, see order , see mar  alert, oriented, denies complaints. Patient instructed to avoid snacks other than appropriate diabetic snacks, offered, verbalized understanding

## 2024-09-27 NOTE — PROGRESS NOTES
"INPATIENT PSYCHIATRIC PROGRESS NOTE    Name:  Ajay Xie  :  2005  MRN:  [id number]  Visit Number:  [id number]  Length of stay:  2    SUBJECTIVE    CC/Focus of Exam: Bipolar II disorder    INTERVAL HISTORY:  The patient reports he got upset yesterday because he felt left out and no one seemed to care about him. He states he was not invited to play games in the day room and it hurt his feelings. He admit he wants everyone to like him and he tries to be a people pleaser sometimes. He states he tries to be nice and when he feels not accepted he becomes disappointed and feels angry at himself because he feels he is not good enough.   Depression rating 0/10  Anxiety rating 0/10  Sleep: fair  Withdrawal sx: None  Cravin/10    Review of Systems   Constitutional: Negative.    Respiratory: Negative.     Cardiovascular: Negative.    Gastrointestinal: Negative.        OBJECTIVE    Temp:  [97.6 °F (36.4 °C)-98.4 °F (36.9 °C)] 97.6 °F (36.4 °C)  Heart Rate:  [74-84] 84  Resp:  [16-18] 18  BP: (129-143)/(62-86) 141/62    MENTAL STATUS EXAM:  Appearance:Casually dressed, good hygeine.   Cooperation:Cooperative  Psychomotor: No psychomotor agitation/retardation, No EPS, No motor tics  Speech-normal rate, amount.  Mood \"better\"   Affect-congruent, appropriate, stable  Thought Content-goal directed, no delusional material present  Thought process-linear, organized.  Suicidality: No SI  Homicidality: No HI  Perception: No AH/VH  Insight-fair   Judgement-fair    Lab Results (last 24 hours)       Procedure Component Value Units Date/Time    POC Glucose Once [[id number]]  (Abnormal) Collected: 24 0608    Specimen: Blood Updated: 24 0614     Glucose 362 mg/dL     POC Glucose Once [[id number]]  (Abnormal) Collected: 24    Specimen: Blood Updated: 24     Glucose 299 mg/dL     POC Glucose Once [[id number]]  (Abnormal) Collected: 24 161    Specimen: Blood Updated: 24 161     " Glucose 321 mg/dL     POC Glucose Once [[id number]]  (Abnormal) Collected: 09/26/24 1103    Specimen: Blood Updated: 09/26/24 1109     Glucose 299 mg/dL                Imaging Results (Last 24 Hours)       ** No results found for the last 24 hours. **               ECG/EMG Results (most recent)       Procedure Component Value Units Date/Time    ECG 12 Lead Other; Baseline Cardiac Status [[id number]] Collected: 09/26/24 0113     Updated: 09/26/24 1335     QT Interval 414 ms      QTC Interval 453 ms     Narrative:      Test Reason : Other~  Blood Pressure :   */*   mmHG  Vent. Rate :  72 BPM     Atrial Rate :  72 BPM     P-R Int : 152 ms          QRS Dur : 112 ms      QT Int : 414 ms       P-R-T Axes :  26   6  32 degrees     QTc Int : 453 ms    Normal sinus rhythm  Normal ECG  When compared with ECG of 26-SEP-2024 01:13, (Unconfirmed)  Previous ECG has undetermined rhythm, needs review  Confirmed by Gary Garcia ([id number]) on 9/26/2024 1:34:39 PM    Referred By:            Confirmed By: Gary Garcia             ALLERGIES: Patient has no known allergies.    Medication Review:   Scheduled Medications:  insulin glargine, 30 Units, Subcutaneous, BID  insulin lispro, 2-9 Units, Subcutaneous, 4x Daily AC & at Bedtime  lamoTRIgine, 25 mg, Oral, Daily  Lurasidone HCl, 20 mg, Oral, Daily With Dinner  metFORMIN, 500 mg, Oral, BID With Meals         PRN Medications:    aluminum-magnesium hydroxide-simethicone    benzonatate    benztropine **OR** benztropine    dextrose    dextrose    famotidine    glucagon (human recombinant)    hydrOXYzine    ibuprofen    loperamide    magnesium hydroxide    ondansetron ODT    polyethylene glycol    sodium chloride    traZODone   All medications reviewed.    ASSESSMENT & PLAN:      Suicidal behavior  -SP 3       Bipolar II disorder  -Continue lurasidone  -Continue lamotrigine       Alcohol use disorder, severe, dependence  -Patient reports no use in more than a week. Monitor for withdrawals        DM (diabetes mellitus), type 2  -Increase insulin glargine 35 U bid  -Sliding scale coverage    Special precautions: Special Precautions Level 3 (q15 min checks) .    Behavioral Health Treatment Plan and Problem List: I have reviewed and approved the Behavioral Health Treatment Plan and Problem list.  The patient has had a chance to review and agrees with the treatment plan.    Copied text in portions of this note has been reviewed and is accurate as of 09/27/24         Clinician:  Ashley Thomas MD  09/27/24  10:34 EDT

## 2024-09-27 NOTE — PLAN OF CARE
Goal Outcome Evaluation:        Problem: Adult Behavioral Health Plan of Care  Goal: Patient-Specific Goal (Individualization)  Outcome: Progressing  Flowsheets  Taken 9/26/2024 1114  Patient-Specific Goals (Include Timeframe): Patient will identify 2-3 coping skills, complete aftercare plans, address relapse prevention methods, and deny SI/HI prior to discharge in 1-7 days. Patient's long term goals are to complete residential rehab program and potentially go back to school.  Individualized Care Needs: Therapist to offer 1-4 therapy sessions, aftercare planning, safety planning, group therapy, family education, and brief CBT/MI interventions.  Anxieties, Fears or Concerns: none verbalized  Taken 9/26/2024 1056  Patient Personal Strengths:   resourceful   resilient   motivated for treatment   motivated for recovery   positive educational history   spiritual/Jewish support  Patient Vulnerabilities:   substance abuse/addiction   history of unsuccessful treatment   poor impulse control   occupational insecurity   limited support system   legal concerns   lacks insight into illness  Goal: Optimized Coping Skills in Response to Life Stressors  Outcome: Progressing  Flowsheets (Taken 9/26/2024 1114)  Optimized Coping Skills in Response to Life Stressors: making progress toward outcome  Intervention: Promote Effective Coping Strategies  Flowsheets (Taken 9/27/2024 0733 by Ciarra Mera, RN)  Supportive Measures:   active listening utilized   problem-solving facilitated   relaxation techniques promoted   self-care encouraged   self-responsibility promoted   verbalization of feelings encouraged  Goal: Develops/Participates in Therapeutic [place] to Support Successful Transition  Outcome: Progressing  Flowsheets (Taken 9/26/2024 1114)  Develops/Participates in Therapeutic [place] to Support Successful Transition: making progress toward outcome  Intervention: Foster Therapeutic [place]  Flowsheets (Taken 9/27/2024  1031)  Trust Relationship/Rapport:   care explained   questions encouraged   emotional support provided   reassurance provided   choices provided   thoughts/feelings acknowledged   empathic listening provided   questions answered  Intervention: Mutually Develop Transition Plan  Flowsheets  Taken 9/27/2024 1030  Discharge Coordination/Progress: Patient has [facility] [place]. Patient to return to [email or web address] at discharge, [facility] to provide transportation.  Transportation Anticipated: agency  Transportation Concerns: no car  Current Discharge Risk:   substance use/abuse   psychiatric illness  Concerns to be Addressed:   substance/tobacco abuse/use   discharge planning   mental health   cognitive/perceptual   coping/stress   medication   suicidal  Readmission Within the Last 30 Days: no previous admission in last 30 days  Patient/Family Anticipated Services at Transition: rehabilitation services  Patient's Choice of Community Agency(s): [facility]  Patient/Family Anticipates Transition to: inpatient rehabilitation facility  Offered/Gave Vendor List: no  Taken 9/26/2024 1114  Outpatient/Agency/Support Group Needs: residential services  Transition Support:   follow-up care discussed   community resources reviewed   crisis management plan promoted   crisis management plan verbalized   follow-up care coordinated  Anticipated Discharge Disposition: residential substance use unit         DATA:  Therapist met with Patient individually this date. Patient agreeable to discuss current treatment progress and discharge concerns.     Therapist attempted to contact [facility] per patient request. He would like for them to bring him a pair of his shoes if possible.     CLINICAL MANUVERING/INTERVENTIONS:    Assisted Patient in processing session content; acknowledged and normalized Patient’s thoughts, feelings, and concerns by utilizing a person-centered approach in efforts to build appropriate rapport and a  positive therapeutic relationship with open and honest communication. Allowed Patient to ventilate regarding current stressors and triggers for negative emotions and thoughts in a safe nonjudgmental environment with unconditional positive regard, active listening skills, and empathy. Therapist implemented motivational interviewing techniques to assist Patient with exploring personal growth and change and discussed distress tolerance skills, self soothing techniques, and applied cognitive behavioral strategies to facilitate identification of maladaptive patterns of thinking and behavior. Therapist assisted Patient with identifying and implementing healthier coping strategies.     ASSESSMENT:  Therapist met with patient on this date, he continues to receive treatment for Bipolar II Disorder. Patient reports improvement in mood today, denies feeling depressed or anxious and denies SI/HI/AVH. Patient discussed an incident that happened yesterday, where he became upset that he was not included in the activities the other patients were participating in and he started banging his head against a wall. He did not really elaborate further and just stated that he was feeling better today and seems to be interacting more with peers. Patient plans to return [facility] at discharge, agency will provide transportation. He denies having any additional needs or concerns.     PLAN:   Patient will continue stabilization. Patient will continue to receive services offered by Treatment Team.     Patient to return [facility] at discharge.

## 2024-09-27 NOTE — PLAN OF CARE
Goal Outcome Evaluation:  Plan of Care Reviewed With: patient  Patient Agreement with Plan of Care: agrees     Progress: no change  Outcome Evaluation: Pt rated anxiety and depression 5/10, positive for SI without a plan, denied HI/AVH, rated good sleep and poor appetite.

## 2024-09-27 NOTE — NURSING NOTE
Patient with BGL of 514 per Peg hernandez, Patient alert oriented, denies complaints. Patient educated on importance of compliance with appropriate carb consistent diet, verbalized understanding

## 2024-09-28 LAB
GLUCOSE BLDC GLUCOMTR-MCNC: 191 MG/DL (ref 70–130)
GLUCOSE BLDC GLUCOMTR-MCNC: 282 MG/DL (ref 70–130)
GLUCOSE BLDC GLUCOMTR-MCNC: 293 MG/DL (ref 70–130)
GLUCOSE BLDC GLUCOMTR-MCNC: 326 MG/DL (ref 70–130)

## 2024-09-28 PROCEDURE — 99232 SBSQ HOSP IP/OBS MODERATE 35: CPT | Performed by: PSYCHIATRY & NEUROLOGY

## 2024-09-28 PROCEDURE — 63710000001 ONDANSETRON ODT 4 MG TABLET DISPERSIBLE: Performed by: PSYCHIATRY & NEUROLOGY

## 2024-09-28 PROCEDURE — 63710000001 INSULIN GLARGINE PER 5 UNITS: Performed by: PSYCHIATRY & NEUROLOGY

## 2024-09-28 PROCEDURE — 63710000001 INSULIN LISPRO (HUMAN) PER 5 UNITS: Performed by: PSYCHIATRY & NEUROLOGY

## 2024-09-28 PROCEDURE — 82948 REAGENT STRIP/BLOOD GLUCOSE: CPT

## 2024-09-28 RX ORDER — IBUPROFEN 400 MG/1
600 TABLET, FILM COATED ORAL EVERY 6 HOURS PRN
Status: DISCONTINUED | OUTPATIENT
Start: 2024-09-28 | End: 2024-09-30 | Stop reason: HOSPADM

## 2024-09-28 RX ADMIN — METFORMIN HYDROCHLORIDE 500 MG: 500 TABLET ORAL at 17:01

## 2024-09-28 RX ADMIN — INSULIN LISPRO 4 UNITS: 100 INJECTION, SOLUTION INTRAVENOUS; SUBCUTANEOUS at 16:39

## 2024-09-28 RX ADMIN — INSULIN LISPRO 16 UNITS: 100 INJECTION, SOLUTION INTRAVENOUS; SUBCUTANEOUS at 11:23

## 2024-09-28 RX ADMIN — IBUPROFEN 400 MG: 400 TABLET, FILM COATED ORAL at 05:50

## 2024-09-28 RX ADMIN — INSULIN LISPRO 12 UNITS: 100 INJECTION, SOLUTION INTRAVENOUS; SUBCUTANEOUS at 07:15

## 2024-09-28 RX ADMIN — INSULIN GLARGINE 35 UNITS: 100 INJECTION, SOLUTION SUBCUTANEOUS at 08:07

## 2024-09-28 RX ADMIN — ONDANSETRON 4 MG: 4 TABLET, ORALLY DISINTEGRATING ORAL at 18:44

## 2024-09-28 RX ADMIN — LURASIDONE HYDROCHLORIDE 20 MG: 40 TABLET, FILM COATED ORAL at 17:01

## 2024-09-28 RX ADMIN — TRAZODONE HYDROCHLORIDE 50 MG: 50 TABLET ORAL at 20:32

## 2024-09-28 RX ADMIN — METFORMIN HYDROCHLORIDE 500 MG: 500 TABLET ORAL at 08:07

## 2024-09-28 RX ADMIN — LAMOTRIGINE 25 MG: 100 TABLET ORAL at 08:07

## 2024-09-28 RX ADMIN — INSULIN LISPRO 12 UNITS: 100 INJECTION, SOLUTION INTRAVENOUS; SUBCUTANEOUS at 20:33

## 2024-09-28 RX ADMIN — INSULIN GLARGINE 35 UNITS: 100 INJECTION, SOLUTION SUBCUTANEOUS at 20:36

## 2024-09-28 NOTE — PLAN OF CARE
Goal Outcome Evaluation:  Plan of Care Reviewed With: patient  Patient Agreement with Plan of Care: agrees     Progress: improving  Outcome Evaluation: Pt denied anxiety and depression, denied SI/HI/AVH, rated poor sleep and good appetite.

## 2024-09-28 NOTE — PLAN OF CARE
Goal Outcome Evaluation:  Plan of Care Reviewed With: patient  Patient Agreement with Plan of Care: agrees     Progress: improving  Outcome Evaluation: Patient calm and cooperative. Patient denies anxiety, depression, SI/HI, or AVH. Patient is hopeful to be discharged soon.

## 2024-09-28 NOTE — PROGRESS NOTES
"      Inpatient Psych Progress Note     Clinician: Brent Galindo MD  Admission Date: 9/25/2024  08:02 EDT 09/28/24    Behavioral Health Treatment Plan and Problem List: I have reviewed and approved the Behavioral Health Treatment Plan and Problem list.    Allergies  No Known Allergies    Hospital Day: 3 days      Assessment completed within view of staff    History  CC/clinical focus: bipolar II disorder     Interval HPI: Patient seen and evaluated by me.  Chart reviewed.  Patient with no new problems overnight. No concerns from nursing staff. He reports good mood without any SI/HI/AVH. Tolerating medications okay. He does complain of knee pain and asks about taking Ibuprofen for this. No known injury. Normal ROM.   Med Compliant.  ROS otherwise as below.    Review of Systems   Constitutional: Negative.    HENT: Negative.     Eyes: Negative.    Respiratory: Negative.     Cardiovascular: Negative.    Gastrointestinal: Negative.    Endocrine: Negative.    Genitourinary: Negative.    Musculoskeletal:  Positive for arthralgias.   Skin: Negative.    Allergic/Immunologic: Negative.    Neurological: Negative.    Hematological: Negative.         /81 (BP Location: Right arm, Patient Position: Lying)   Pulse 79   Temp 97.2 °F (36.2 °C) (Temporal)   Resp 20   Ht 175.3 cm (69\")   Wt (!) 141 kg (311 lb)   SpO2 96%   BMI 45.93 kg/m²     Mental Status Exam  Mood:  \"pretty good\"  Affect: mood-congruent   Thought Processes:  linear  Thought Content: normal  Hallucinations: no  Suicidal Thoughts: denies  Suicidal Plan/Intent: denies  Hopelesness:no  Homicidal Thoughts:  denies      Medical Decision Making:   Labs:     Lab Results (last 24 hours)       Procedure Component Value Units Date/Time    POC Glucose Once [[id number]]  (Abnormal) Collected: 09/28/24 0633    Specimen: Blood Updated: 09/28/24 0640     Glucose 293 mg/dL     POC Glucose Once [[id number]]  (Abnormal) Collected: 09/27/24 2101    Specimen: Blood Updated: " 09/27/24 2107     Glucose 299 mg/dL     POC Glucose Once [[id number]]  (Abnormal) Collected: 09/27/24 1604    Specimen: Blood Updated: 09/27/24 1611     Glucose 273 mg/dL     POC Glucose Once [[id number]]  (Abnormal) Collected: 09/27/24 1403    Specimen: Blood Updated: 09/27/24 1410     Glucose 287 mg/dL     POC Glucose Once [[id number]]  (Abnormal) Collected: 09/27/24 1306    Specimen: Blood Updated: 09/27/24 1318     Glucose 421 mg/dL     POC Glucose Once [[id number]]  (Abnormal) Collected: 09/27/24 1208    Specimen: Blood Updated: 09/27/24 1219     Glucose 436 mg/dL               Radiology:     Imaging Results (Last 24 Hours)       ** No results found for the last 24 hours. **              EKG:     ECG/EMG Results (most recent)       Procedure Component Value Units Date/Time    ECG 12 Lead Other; Baseline Cardiac Status [[id number]] Collected: 09/26/24 0113     Updated: 09/26/24 1335     QT Interval 414 ms      QTC Interval 453 ms     Narrative:      Test Reason : Other~  Blood Pressure :   */*   mmHG  Vent. Rate :  72 BPM     Atrial Rate :  72 BPM     P-R Int : 152 ms          QRS Dur : 112 ms      QT Int : 414 ms       P-R-T Axes :  26   6  32 degrees     QTc Int : 453 ms    Normal sinus rhythm  Normal ECG  When compared with ECG of 26-SEP-2024 01:13, (Unconfirmed)  Previous ECG has undetermined rhythm, needs review  Confirmed by Gary Garcia ([id number]) on 9/26/2024 1:34:39 PM    Referred By:            Confirmed By: Gary Garcia             Medications:  insulin glargine, 35 Units, Subcutaneous, BID  insulin lispro, 4-24 Units, Subcutaneous, 4x Daily AC & at Bedtime  lamoTRIgine, 25 mg, Oral, Daily  Lurasidone HCl, 20 mg, Oral, Daily With Dinner  metFORMIN, 500 mg, Oral, BID With Meals           All medications reviewed.      Assessment and Plan:     Suicidal behavior  - Admitted for safety and stabilization   - SP 3     Bipolar II disorder  - Continue lurasidone  - Continue lamotrigine     Alcohol use disorder,  severe, dependence  - Patient reports no use in more than a week. Monitor for withdrawals     DM (diabetes mellitus), type 2  - Increased insulin glargine to 35 U bid  - Sliding scale coverage  - Metformin     Right knee pain  - Ibuprofen 600 mg TID as needed         Continue hospitalization for safety and stabilization.  Continue current level of Special Precautions (q15 minute checks).        This note was generated by a scribe, Dylan Jin. The work documented in this note was completed, reviewed, and approved by the attending psychiatrist as designated Dr. Brent Galindo electronic signature.     IBrent MD, personally performed the services described in this documentation as scribed by the above named individual and is both accurate and complete as of 9/28/2024.

## 2024-09-29 LAB
GLUCOSE BLDC GLUCOMTR-MCNC: 310 MG/DL (ref 70–130)
GLUCOSE BLDC GLUCOMTR-MCNC: 338 MG/DL (ref 70–130)
GLUCOSE BLDC GLUCOMTR-MCNC: 350 MG/DL (ref 70–130)
GLUCOSE BLDC GLUCOMTR-MCNC: 356 MG/DL (ref 70–130)
GLUCOSE BLDC GLUCOMTR-MCNC: 383 MG/DL (ref 70–130)

## 2024-09-29 PROCEDURE — 63710000001 INSULIN LISPRO (HUMAN) PER 5 UNITS: Performed by: PSYCHIATRY & NEUROLOGY

## 2024-09-29 PROCEDURE — 99232 SBSQ HOSP IP/OBS MODERATE 35: CPT | Performed by: PSYCHIATRY & NEUROLOGY

## 2024-09-29 PROCEDURE — 82948 REAGENT STRIP/BLOOD GLUCOSE: CPT

## 2024-09-29 PROCEDURE — 63710000001 ONDANSETRON ODT 4 MG TABLET DISPERSIBLE: Performed by: PSYCHIATRY & NEUROLOGY

## 2024-09-29 PROCEDURE — 63710000001 INSULIN GLARGINE PER 5 UNITS: Performed by: PSYCHIATRY & NEUROLOGY

## 2024-09-29 RX ADMIN — LAMOTRIGINE 25 MG: 100 TABLET ORAL at 08:33

## 2024-09-29 RX ADMIN — INSULIN LISPRO 16 UNITS: 100 INJECTION, SOLUTION INTRAVENOUS; SUBCUTANEOUS at 12:08

## 2024-09-29 RX ADMIN — HYDROXYZINE HYDROCHLORIDE 50 MG: 50 TABLET, FILM COATED ORAL at 20:34

## 2024-09-29 RX ADMIN — INSULIN GLARGINE 35 UNITS: 100 INJECTION, SOLUTION SUBCUTANEOUS at 20:38

## 2024-09-29 RX ADMIN — INSULIN GLARGINE 35 UNITS: 100 INJECTION, SOLUTION SUBCUTANEOUS at 08:34

## 2024-09-29 RX ADMIN — INSULIN LISPRO 16 UNITS: 100 INJECTION, SOLUTION INTRAVENOUS; SUBCUTANEOUS at 16:50

## 2024-09-29 RX ADMIN — TRAZODONE HYDROCHLORIDE 50 MG: 50 TABLET ORAL at 20:34

## 2024-09-29 RX ADMIN — ONDANSETRON 4 MG: 4 TABLET, ORALLY DISINTEGRATING ORAL at 07:00

## 2024-09-29 RX ADMIN — IBUPROFEN 600 MG: 400 TABLET, FILM COATED ORAL at 03:18

## 2024-09-29 RX ADMIN — METFORMIN HYDROCHLORIDE 500 MG: 500 TABLET ORAL at 08:33

## 2024-09-29 RX ADMIN — LURASIDONE HYDROCHLORIDE 20 MG: 40 TABLET, FILM COATED ORAL at 17:14

## 2024-09-29 RX ADMIN — METFORMIN HYDROCHLORIDE 500 MG: 500 TABLET ORAL at 17:14

## 2024-09-29 RX ADMIN — INSULIN LISPRO 20 UNITS: 100 INJECTION, SOLUTION INTRAVENOUS; SUBCUTANEOUS at 07:55

## 2024-09-29 RX ADMIN — INSULIN LISPRO 20 UNITS: 100 INJECTION, SOLUTION INTRAVENOUS; SUBCUTANEOUS at 20:38

## 2024-09-29 NOTE — PLAN OF CARE
Goal Outcome Evaluation:  Plan of Care Reviewed With: patient  Patient Agreement with Plan of Care: agrees     Progress: improving  Outcome Evaluation: Pt denied anxiety and depression, denied SI/HI/AVH. Rated poor sleep and good appetite. Spend the majority of free time in dayroom.

## 2024-09-29 NOTE — PROGRESS NOTES
"      Inpatient Psych Progress Note     Clinician: Brent Galindo MD  Admission Date: 9/25/2024  07:50 EDT 09/29/24    Behavioral Health Treatment Plan and Problem List: I have reviewed and approved the Behavioral Health Treatment Plan and Problem list.    Allergies  No Known Allergies    Hospital Day: 4 days      Assessment completed within view of staff    History  CC/clinical focus: bipolar II disorder     Interval HPI: Patient seen and evaluated by me.  Chart reviewed.  No new problems overnight. Patient is stable from a psychiatric standpoint. He has been spending time in dayroom, interacting appropriately with peers. Denies SI/HI/AVH. Denies any side effects from his medications.   Med Compliant.  ROS otherwise as below.    Review of Systems   Constitutional: Negative.    HENT: Negative.     Eyes: Negative.    Respiratory: Negative.     Cardiovascular: Negative.    Gastrointestinal: Negative.    Endocrine: Negative.    Genitourinary: Negative.    Musculoskeletal:  Positive for arthralgias.   Skin: Negative.    Allergic/Immunologic: Negative.    Neurological: Negative.    Hematological: Negative.         /86 (BP Location: Right arm, Patient Position: Sitting)   Pulse 79   Temp 96.8 °F (36 °C) (Temporal)   Resp 18   Ht 175.3 cm (69\")   Wt (!) 141 kg (311 lb)   SpO2 96%   BMI 45.93 kg/m²     Mental Status Exam  Mood: \"good\"  Affect: mood-congruent   Thought Processes:  linear  Thought Content: normal  Hallucinations: no  Suicidal Thoughts: denies  Suicidal Plan/Intent: denies  Hopelesness:no  Homicidal Thoughts:  denies      Medical Decision Making:   Labs:     Lab Results (last 24 hours)       Procedure Component Value Units Date/Time    POC Glucose Once [[id number]]  (Abnormal) Collected: 09/29/24 1130    Specimen: Blood Updated: 09/29/24 1137     Glucose 338 mg/dL     POC Glucose Once [[id number]]  (Abnormal) Collected: 09/29/24 0625    Specimen: Blood Updated: 09/29/24 0631     Glucose 356 mg/dL  "    POC Glucose Once [[id number]]  (Abnormal) Collected: 09/29/24 0409    Specimen: Blood Updated: 09/29/24 0418     Glucose 383 mg/dL     POC Glucose Once [[id number]]  (Abnormal) Collected: 09/28/24 2027    Specimen: Blood Updated: 09/28/24 2044     Glucose 282 mg/dL     POC Glucose Once [[id number]]  (Abnormal) Collected: 09/28/24 1637    Specimen: Blood Updated: 09/28/24 1645     Glucose 191 mg/dL               Radiology:     Imaging Results (Last 24 Hours)       ** No results found for the last 24 hours. **              EKG:     ECG/EMG Results (most recent)       Procedure Component Value Units Date/Time    ECG 12 Lead Other; Baseline Cardiac Status [[id number]] Collected: 09/26/24 0113     Updated: 09/26/24 1335     QT Interval 414 ms      QTC Interval 453 ms     Narrative:      Test Reason : Other~  Blood Pressure :   */*   mmHG  Vent. Rate :  72 BPM     Atrial Rate :  72 BPM     P-R Int : 152 ms          QRS Dur : 112 ms      QT Int : 414 ms       P-R-T Axes :  26   6  32 degrees     QTc Int : 453 ms    Normal sinus rhythm  Normal ECG  When compared with ECG of 26-SEP-2024 01:13, (Unconfirmed)  Previous ECG has undetermined rhythm, needs review  Confirmed by Gary Garcia ([id number]) on 9/26/2024 1:34:39 PM    Referred By:            Confirmed By: Gary Garcia             Medications:  insulin glargine, 35 Units, Subcutaneous, BID  insulin lispro, 4-24 Units, Subcutaneous, 4x Daily AC & at Bedtime  lamoTRIgine, 25 mg, Oral, Daily  Lurasidone HCl, 20 mg, Oral, Daily With Dinner  metFORMIN, 500 mg, Oral, BID With Meals           All medications reviewed.      Assessment and Plan:     Suicidal behavior  - Admitted for safety and stabilization   - SP 3     Bipolar II disorder  - Continue lurasidone  - Continue lamotrigine     Alcohol use disorder, severe, dependence  - Patient reports no use in more than a week. Monitor for withdrawals     DM (diabetes mellitus), type 2  - Increased insulin glargine to 35 U bid  -  Sliding scale coverage  - Metformin      Right knee pain  - Ibuprofen 600 mg TID as needed         Continue hospitalization for safety and stabilization.  Continue current level of Special Precautions (q15 minute checks).        This note was generated by a scribe, Dylan Jin. The work documented in this note was completed, reviewed, and approved by the attending psychiatrist as designated Dr. Brent Galindo electronic signature.     IBrent MD, personally performed the services described in this documentation as scribed by the above named individual and is both accurate and complete as of 9/29/2024.

## 2024-09-29 NOTE — PLAN OF CARE
Problem: Adult Behavioral Health Plan of Care  Goal: Plan of Care Review  Outcome: Progressing  Flowsheets  Taken 9/29/2024 1152  Progress: improving  Outcome Evaluation: PATIENT DENIES ANY PROBLEMS THIS SHIFT. PATIENT IS AOX3, COOPERATIVE WITH NO S/S OF ACUTE DISTRESS NOTED.  Taken 9/29/2024 0827  Plan of Care Reviewed With: patient  Patient Agreement with Plan of Care: agrees   Goal Outcome Evaluation:  Plan of Care Reviewed With: patient  Patient Agreement with Plan of Care: agrees     Progress: improving  Outcome Evaluation: PATIENT DENIES ANY PROBLEMS THIS SHIFT. PATIENT IS AOX3, COOPERATIVE WITH NO S/S OF ACUTE DISTRESS NOTED.

## 2024-09-30 VITALS
HEART RATE: 74 BPM | HEIGHT: 69 IN | SYSTOLIC BLOOD PRESSURE: 127 MMHG | WEIGHT: 311 LBS | TEMPERATURE: 97.2 F | BODY MASS INDEX: 46.06 KG/M2 | OXYGEN SATURATION: 97 % | RESPIRATION RATE: 18 BRPM | DIASTOLIC BLOOD PRESSURE: 60 MMHG

## 2024-09-30 LAB
GLUCOSE BLDC GLUCOMTR-MCNC: 223 MG/DL (ref 70–130)
GLUCOSE BLDC GLUCOMTR-MCNC: 366 MG/DL (ref 70–130)

## 2024-09-30 PROCEDURE — 63710000001 INSULIN LISPRO (HUMAN) PER 5 UNITS: Performed by: PSYCHIATRY & NEUROLOGY

## 2024-09-30 PROCEDURE — 82948 REAGENT STRIP/BLOOD GLUCOSE: CPT

## 2024-09-30 PROCEDURE — 99239 HOSP IP/OBS DSCHRG MGMT >30: CPT | Performed by: PSYCHIATRY & NEUROLOGY

## 2024-09-30 PROCEDURE — 63710000001 INSULIN GLARGINE PER 5 UNITS: Performed by: PSYCHIATRY & NEUROLOGY

## 2024-09-30 RX ORDER — INSULIN GLARGINE 100 [IU]/ML
35 INJECTION, SOLUTION SUBCUTANEOUS 2 TIMES DAILY
Status: ON HOLD
Start: 2024-09-30 | End: 2024-10-04

## 2024-09-30 RX ADMIN — INSULIN LISPRO 20 UNITS: 100 INJECTION, SOLUTION INTRAVENOUS; SUBCUTANEOUS at 11:13

## 2024-09-30 RX ADMIN — LAMOTRIGINE 25 MG: 100 TABLET ORAL at 09:25

## 2024-09-30 RX ADMIN — METFORMIN HYDROCHLORIDE 500 MG: 500 TABLET ORAL at 09:25

## 2024-09-30 RX ADMIN — IBUPROFEN 600 MG: 400 TABLET, FILM COATED ORAL at 07:11

## 2024-09-30 RX ADMIN — INSULIN GLARGINE 35 UNITS: 100 INJECTION, SOLUTION SUBCUTANEOUS at 09:26

## 2024-09-30 RX ADMIN — INSULIN LISPRO 8 UNITS: 100 INJECTION, SOLUTION INTRAVENOUS; SUBCUTANEOUS at 07:53

## 2024-09-30 NOTE — DISCHARGE SUMMARY
":  2005  MRN:  [id number]  Visit Number:  [id number]      Date of Admission:2024   Date of Discharge:  2024    Discharge Diagnosis:  Principal Problem:    Suicidal behavior  Active Problems:    Bipolar II disorder    Alcohol use disorder, severe, dependence    DM (diabetes mellitus), type 2        Admission Diagnosis:  Suicidal behavior [R45.89]     RAY Xie is a 19 y.o. male who was admitted on 2024 with complaints of depression.  Patient states he came from [facility] because he was having suicidal ideation.   For details please see H&P dated 24.     Hospital Course  Patient is a 19 y.o. male presented with worsening depression and suicidal ideations. The patient was admitted to the [facility] adult psych unit for safety, further evaluation and treatment.  The patient was continued on the medications he was recently started on. He was able to take the medications without any adverse effects. His insulin dose was adjusted to glargine 35 U bid as his blood glucose levels were elevated. He was encouraged to adhere to his dietary restrictions and to choose healthy lifestyle including regular exercise, sleep hygiene and appropriate diet.   The patient was also able to take part in individual and group counseling sessions and work on appropriate coping skills.  The patient made steady improvement in his mood and expressed feeling more positive and hopeful about future. Sleep and appetite were improved.  The day of discharge the patient was calm, cooperative and pleasant. Mood was reported to be good, and denied SI/HI/AVH. Also reported no medication side effects.        Mental Status Exam upon discharge:   Mood \"good\"   Affect-congruent, appropriate, stable  Thought Content-goal directed, no delusional material present  Thought process-linear, organized.  Suicidality: No SI  Homicidality: No HI  Perception: No AH/    Procedures Performed         Consults:   Consults  "      No orders found from 8/27/2024 to 9/26/2024.            Pertinent Test Results:   Admission on 09/25/2024   Component Date Value Ref Range Status    Hepatitis B Surface Ag 09/25/2024 Non-Reactive  Non-Reactive Final    Hep A IgM 09/25/2024 Non-Reactive  Non-Reactive Final    Hep B C IgM 09/25/2024 Non-Reactive  Non-Reactive Final    Hepatitis C Ab 09/25/2024 Non-Reactive  Non-Reactive Final    QT Interval 09/26/2024 414  ms Final    QTC Interval 09/26/2024 453  ms Final    Glucose 09/26/2024 276 (H)  70 - 130 mg/dL Final    Glucose 09/26/2024 299 (H)  70 - 130 mg/dL Final    Glucose 09/26/2024 321 (H)  70 - 130 mg/dL Final    Glucose 09/26/2024 299 (H)  70 - 130 mg/dL Final    Glucose 09/27/2024 362 (H)  70 - 130 mg/dL Final    Glucose 09/27/2024 514 (C)  70 - 130 mg/dL Final    Glucose 09/27/2024 436 (C)  70 - 130 mg/dL Final    Glucose 09/27/2024 421 (C)  70 - 130 mg/dL Final    Glucose 09/27/2024 287 (H)  70 - 130 mg/dL Final    Glucose 09/27/2024 273 (H)  70 - 130 mg/dL Final    Glucose 09/27/2024 299 (H)  70 - 130 mg/dL Final    Glucose 09/28/2024 293 (H)  70 - 130 mg/dL Final    Glucose 09/28/2024 326 (H)  70 - 130 mg/dL Final    Glucose 09/28/2024 191 (H)  70 - 130 mg/dL Final    Glucose 09/28/2024 282 (H)  70 - 130 mg/dL Final    Glucose 09/29/2024 383 (H)  70 - 130 mg/dL Final    Glucose 09/29/2024 356 (H)  70 - 130 mg/dL Final    Glucose 09/29/2024 338 (H)  70 - 130 mg/dL Final    Glucose 09/29/2024 310 (H)  70 - 130 mg/dL Final    Glucose 09/29/2024 350 (H)  70 - 130 mg/dL Final    Glucose 09/30/2024 223 (H)  70 - 130 mg/dL Final   Admission on 09/25/2024, Discharged on 09/25/2024   Component Date Value Ref Range Status    Glucose 09/25/2024 383 (H)  65 - 99 mg/dL Final    BUN 09/25/2024 11  6 - 20 mg/dL Final    Creatinine 09/25/2024 0.70 (L)  0.76 - 1.27 mg/dL Final    Sodium 09/25/2024 136  136 - 145 mmol/L Final    Potassium 09/25/2024 4.1  3.5 - 5.2 mmol/L Final    Slight hemolysis detected by  analyzer. Result may be falsely elevated.    Chloride 09/25/2024 103  98 - 107 mmol/L Final    CO2 09/25/2024 21.2 (L)  22.0 - 29.0 mmol/L Final    Calcium 09/25/2024 9.4  8.6 - 10.5 mg/dL Final    Total Protein 09/25/2024 7.7  6.0 - 8.5 g/dL Final    Albumin 09/25/2024 4.3  3.5 - 5.2 g/dL Final    ALT (SGPT) 09/25/2024 48 (H)  1 - 41 U/L Final    AST (SGOT) 09/25/2024 38  1 - 40 U/L Final    Alkaline Phosphatase 09/25/2024 135 (H)  39 - 117 U/L Final    Total Bilirubin 09/25/2024 0.8  0.0 - 1.2 mg/dL Final    Globulin 09/25/2024 3.4  gm/dL Final    A/G Ratio 09/25/2024 1.3  g/dL Final    BUN/Creatinine Ratio 09/25/2024 15.7  7.0 - 25.0 Final    Anion Gap 09/25/2024 11.8  5.0 - 15.0 mmol/L Final    eGFR 09/25/2024 134.4  >60.0 mL/min/1.73 Final    Color, UA 09/25/2024 Yellow  Yellow, Straw Final    Appearance, UA 09/25/2024 Clear  Clear Final    pH, UA 09/25/2024 7.0  5.0 - 8.0 Final    Specific Gravity, UA 09/25/2024 >1.030 (H)  1.005 - 1.030 Final    Glucose, UA 09/25/2024 >=1000 mg/dL (3+) (A)  Negative Final    Ketones, UA 09/25/2024 Negative  Negative Final    Bilirubin, UA 09/25/2024 Negative  Negative Final    Blood, UA 09/25/2024 Negative  Negative Final    Protein, UA 09/25/2024 Trace (A)  Negative Final    Leuk Esterase, UA 09/25/2024 Negative  Negative Final    Nitrite, UA 09/25/2024 Negative  Negative Final    Urobilinogen, UA 09/25/2024 1.0 E.U./dL  0.2 - 1.0 E.U./dL Final    THC, Screen, Urine 09/25/2024 Negative  Negative Final    Phencyclidine (PCP), Urine 09/25/2024 Negative  Negative Final    Cocaine Screen, Urine 09/25/2024 Negative  Negative Final    Methamphetamine, Ur 09/25/2024 Negative  Negative Final    Opiate Screen 09/25/2024 Negative  Negative Final    Amphetamine Screen, Urine 09/25/2024 Negative  Negative Final    Benzodiazepine Screen, Urine 09/25/2024 Negative  Negative Final    Tricyclic Antidepressants Screen 09/25/2024 Negative  Negative Final    Methadone Screen, Urine 09/25/2024  Negative  Negative Final    Barbiturates Screen, Urine 09/25/2024 Negative  Negative Final    Oxycodone Screen, Urine 09/25/2024 Negative  Negative Final    Buprenorphine, Screen, Urine 09/25/2024 Negative  Negative Final    Magnesium 09/25/2024 2.0  1.6 - 2.6 mg/dL Final    Ethanol 09/25/2024 <10  0 - 10 mg/dL Final    Ethanol % 09/25/2024 <0.010  % Final    WBC 09/25/2024 4.34  3.40 - 10.80 10*3/mm3 Final    RBC 09/25/2024 4.38  4.14 - 5.80 10*6/mm3 Final    Hemoglobin 09/25/2024 14.1  13.0 - 17.7 g/dL Final    Hematocrit 09/25/2024 39.8  37.5 - 51.0 % Final    MCV 09/25/2024 90.9  79.0 - 97.0 fL Final    MCH 09/25/2024 32.2  26.6 - 33.0 pg Final    MCHC 09/25/2024 35.4  31.5 - 35.7 g/dL Final    RDW 09/25/2024 12.8  12.3 - 15.4 % Final    RDW-SD 09/25/2024 42.1  37.0 - 54.0 fl Final    MPV 09/25/2024 10.0  6.0 - 12.0 fL Final    Platelets 09/25/2024 112 (L)  140 - 450 10*3/mm3 Final    Neutrophil % 09/25/2024 46.5  42.7 - 76.0 % Final    Lymphocyte % 09/25/2024 40.6  19.6 - 45.3 % Final    Monocyte % 09/25/2024 10.4  5.0 - 12.0 % Final    Eosinophil % 09/25/2024 1.8  0.3 - 6.2 % Final    Basophil % 09/25/2024 0.2  0.0 - 1.5 % Final    Immature Grans % 09/25/2024 0.5  0.0 - 0.5 % Final    Neutrophils, Absolute 09/25/2024 2.02  1.70 - 7.00 10*3/mm3 Final    Lymphocytes, Absolute 09/25/2024 1.76  0.70 - 3.10 10*3/mm3 Final    Monocytes, Absolute 09/25/2024 0.45  0.10 - 0.90 10*3/mm3 Final    Eosinophils, Absolute 09/25/2024 0.08  0.00 - 0.40 10*3/mm3 Final    Basophils, Absolute 09/25/2024 0.01  0.00 - 0.20 10*3/mm3 Final    Immature Grans, Absolute 09/25/2024 0.02  0.00 - 0.05 10*3/mm3 Final    nRBC 09/25/2024 0.0  0.0 - 0.2 /100 WBC Final    Fentanyl, Urine 09/25/2024 Negative  Negative Final   Admission on 09/25/2024, Discharged on 09/25/2024   Component Date Value Ref Range Status    Glucose 09/25/2024 463 (C)  65 - 99 mg/dL Final    BUN 09/25/2024 11  6 - 20 mg/dL Final    Creatinine 09/25/2024 0.78  0.76 -  1.27 mg/dL Final    Sodium 09/25/2024 135 (L)  136 - 145 mmol/L Final    Potassium 09/25/2024 4.4  3.5 - 5.2 mmol/L Final    Slight hemolysis detected by analyzer. Result may be falsely elevated.    Chloride 09/25/2024 103  98 - 107 mmol/L Final    CO2 09/25/2024 15.2 (L)  22.0 - 29.0 mmol/L Final    Calcium 09/25/2024 9.2  8.6 - 10.5 mg/dL Final    Total Protein 09/25/2024 7.4  6.0 - 8.5 g/dL Final    Albumin 09/25/2024 3.8  3.5 - 5.2 g/dL Final    ALT (SGPT) 09/25/2024 47 (H)  1 - 41 U/L Final    AST (SGOT) 09/25/2024 43 (H)  1 - 40 U/L Final    Alkaline Phosphatase 09/25/2024 142 (H)  39 - 117 U/L Final    Total Bilirubin 09/25/2024 0.9  0.0 - 1.2 mg/dL Final    Globulin 09/25/2024 3.6  gm/dL Final    A/G Ratio 09/25/2024 1.1  g/dL Final    BUN/Creatinine Ratio 09/25/2024 14.1  7.0 - 25.0 Final    Anion Gap 09/25/2024 16.8 (H)  5.0 - 15.0 mmol/L Final    eGFR 09/25/2024 130.1  >60.0 mL/min/1.73 Final    Color, UA 09/25/2024 Yellow  Yellow, Straw Final    Appearance, UA 09/25/2024 Clear  Clear Final    pH, UA 09/25/2024 6.5  5.0 - 8.0 Final    Specific Gravity, UA 09/25/2024 >1.030 (H)  1.005 - 1.030 Final    Glucose, UA 09/25/2024 >=1000 mg/dL (3+) (A)  Negative Final    Ketones, UA 09/25/2024 Negative  Negative Final    Bilirubin, UA 09/25/2024 Negative  Negative Final    Blood, UA 09/25/2024 Negative  Negative Final    Protein, UA 09/25/2024 Negative  Negative Final    Leuk Esterase, UA 09/25/2024 Negative  Negative Final    Nitrite, UA 09/25/2024 Negative  Negative Final    Urobilinogen, UA 09/25/2024 0.2 E.U./dL  0.2 - 1.0 E.U./dL Final    THC, Screen, Urine 09/25/2024 Negative  Negative Final    Phencyclidine (PCP), Urine 09/25/2024 Negative  Negative Final    Cocaine Screen, Urine 09/25/2024 Negative  Negative Final    Methamphetamine, Ur 09/25/2024 Negative  Negative Final    Opiate Screen 09/25/2024 Negative  Negative Final    Amphetamine Screen, Urine 09/25/2024 Negative  Negative Final    Benzodiazepine  Screen, Urine 09/25/2024 Negative  Negative Final    Tricyclic Antidepressants Screen 09/25/2024 Negative  Negative Final    Methadone Screen, Urine 09/25/2024 Negative  Negative Final    Barbiturates Screen, Urine 09/25/2024 Negative  Negative Final    Oxycodone Screen, Urine 09/25/2024 Negative  Negative Final    Buprenorphine, Screen, Urine 09/25/2024 Negative  Negative Final    Ethanol 09/25/2024 <10  0 - 10 mg/dL Final    Ethanol % 09/25/2024 <0.010  % Final    Magnesium 09/25/2024 2.0  1.6 - 2.6 mg/dL Final    WBC 09/25/2024 4.03  3.40 - 10.80 10*3/mm3 Final    RBC 09/25/2024 4.35  4.14 - 5.80 10*6/mm3 Final    Hemoglobin 09/25/2024 14.2  13.0 - 17.7 g/dL Final    Hematocrit 09/25/2024 41.0  37.5 - 51.0 % Final    MCV 09/25/2024 94.3  79.0 - 97.0 fL Final    MCH 09/25/2024 32.6  26.6 - 33.0 pg Final    MCHC 09/25/2024 34.6  31.5 - 35.7 g/dL Final    RDW 09/25/2024 12.7  12.3 - 15.4 % Final    RDW-SD 09/25/2024 43.8  37.0 - 54.0 fl Final    MPV 09/25/2024 10.6  6.0 - 12.0 fL Final    Platelets 09/25/2024 54 (L)  140 - 450 10*3/mm3 Final    Neutrophil % 09/25/2024 54.1  42.7 - 76.0 % Final    Lymphocyte % 09/25/2024 33.3  19.6 - 45.3 % Final    Monocyte % 09/25/2024 10.2  5.0 - 12.0 % Final    Eosinophil % 09/25/2024 2.0  0.3 - 6.2 % Final    Basophil % 09/25/2024 0.2  0.0 - 1.5 % Final    Immature Grans % 09/25/2024 0.2  0.0 - 0.5 % Final    Neutrophils, Absolute 09/25/2024 2.18  1.70 - 7.00 10*3/mm3 Final    Lymphocytes, Absolute 09/25/2024 1.34  0.70 - 3.10 10*3/mm3 Final    Monocytes, Absolute 09/25/2024 0.41  0.10 - 0.90 10*3/mm3 Final    Eosinophils, Absolute 09/25/2024 0.08  0.00 - 0.40 10*3/mm3 Final    Basophils, Absolute 09/25/2024 0.01  0.00 - 0.20 10*3/mm3 Final    Immature Grans, Absolute 09/25/2024 0.01  0.00 - 0.05 10*3/mm3 Final    nRBC 09/25/2024 0.0  0.0 - 0.2 /100 WBC Final    Fentanyl, Urine 09/25/2024 Negative  Negative Final    RBC Morphology 09/25/2024 Normal  Normal Final    Clumped  Platelets 09/25/2024 Present  None Seen Final    Acetone 09/25/2024 Negative  Negative Final          Condition on Discharge:  improved    Vital Signs  Temp:  [97.1 °F (36.2 °C)-97.2 °F (36.2 °C)] 97.2 °F (36.2 °C)  Heart Rate:  [74-83] 74  Resp:  [18-20] 18  BP: (127-131)/(60-69) 127/60      Discharge Disposition:  Rehab Facility or Unit (DC - External)    Discharge Medications:     Discharge Medications        Changes to Medications        Instructions Start Date   Lantus SoloStar 100 UNIT/ML injection pen  Generic drug: Insulin Glargine  What changed: how much to take   35 Units, Subcutaneous, 2 Times Daily             Continue These Medications        Instructions Start Date   hydrOXYzine 25 MG tablet  Commonly known as: ATARAX   25 mg, Oral, Every 4 Hours PRN      lamoTRIgine 25 MG tablet  Commonly known as: LaMICtal   Take 1 tablet by mouth Daily for 14 days, THEN 2 tablets Daily for 14 days. Indications: Manic-Depression   Start Date: September 24, 2024     Lurasidone HCl 20 MG tablet tablet  Commonly known as: LATUDA   20 mg, Oral, Daily With Dinner      metFORMIN 500 MG tablet  Commonly known as: GLUCOPHAGE   500 mg, Oral, 2 Times Daily With Meals      traZODone 50 MG tablet  Commonly known as: DESYREL   50 mg, Oral, Nightly               Discharge Diet: Consistent carbohydrate     Activity at Discharge: As tolerated     Follow-up Appointments  Future Appointments   Date Time Provider Department Center   10/1/2024 11:30 AM Mirta Srinivasan MD [facility] [address]      Time: I spent  > 30  minutes on this discharge activity which included: face-to-face encounter with the patient, reviewing the data in the system, coordination of the care with the nursing staff as well as consultants, documentation, and entering orders.        Clinician:   Ashley Thomas MD  09/30/24  10:54 EDT

## 2024-09-30 NOTE — PLAN OF CARE
Problem: Adult Behavioral Health Plan of Care  Goal: Plan of Care Review  9/30/2024 0328 by Bryan Sharpe RN  Outcome: Progressing  Flowsheets (Taken 9/30/2024 0328)  Progress: improving  Plan of Care Reviewed With: patient  Patient Agreement with Plan of Care: agrees  Outcome Evaluation: Pt denies any Anx Dep SI/HI/AVH reports adequate bowel movement, sleeping routine, and appetite Although pt educated upon increased intake of sugary foods such as ice cream/carbs in continues to overindulge. His last glucose 350 at 9/29/24 recieved insulin 20 units at 2038 9/29/24 No acute distress noted will continue to monitor   Goal Outcome Evaluation:  Plan of Care Reviewed With: patient  Patient Agreement with Plan of Care: agrees     Progress: improving  Outcome Evaluation: Pt denies any Anx Dep SI/HI/AVH reports adequate bowel movement, sleeping routine, and appetite Although pt educated upon increased intake of sugary foods such as ice cream/carbs in continues to overindulge. His last glucose 350 at 9/29/24 recieved insulin 20 units at 2038 9/29/24 No acute distress noted will continue to monitor

## 2024-09-30 NOTE — CASE MANAGEMENT/SOCIAL WORK
Patient Name:  Ajay Xie  YOB: 2005  MRN: [id number]  Admit Date:  9/25/2024    Patient is being discharged back to [facility], [place] on this date, agency to provide transportation. Patient reports improvement in mood, denies SI/HI/AVH. Healthy coping skills have been reviewed. Patient has been assisted with identifying risk factors which would indicate the need for higher level of care including thoughts to harm self or others and/or self-harming behavior. Encouraged patient to notify rehab staff, call [phone] or present to the nearest emergency room should any of these events occur. No other needs identified.     Electronically signed by:  FRANKLIN Murphy  09/30/24 10:59 EDT

## 2024-09-30 NOTE — PAYOR COMM NOTE
"Ramiro Gonzales (19 y.o. Male)       Date of Birth   2005    Social Security Number       Address   [address] [facility] [address] [facility] [address]    Home Phone   [phone]    MRN   [id number]       Scientologist   Denominational    Marital Status   Single                            Admission Date   9/25/24    Admission Type   Emergency    Admitting Provider   Brent Galindo MD    Attending Provider       Department, Room/Bed   [facility] ADULT [facility], 1018/2S       Discharge Date   9/30/2024    Discharge Disposition   Rehab Facility or Unit (DC - External)    Discharge Destination                                 Attending Provider: (none)   Allergies: No Known Allergies    Isolation: None   Infection: None   Code Status: CPR    Ht: 175.3 cm (69\")   Wt: 141 kg (311 lb)    Admission Cmt: None   Principal Problem: Suicidal behavior [R45.89]                   Active Insurance as of 9/25/2024       Primary Coverage       Payor Plan Insurance Group Employer/Plan Group    [employer] [place] [employer] [place]       Payor Plan Address Payor Plan Phone Number Payor Plan Fax Number Effective Dates    [address] [phone]  4/1/2024 - None Entered    [facility] [address]         Subscriber Name Subscriber Birth Date Member ID       RAMIRO GONZALES 2005 [id number]               Secondary Coverage       Payor Plan Insurance Group Employer/Plan Group    [place] [id number]       Payor Plan Address Payor Plan Phone Number Payor Plan Fax Number Effective Dates    [employer] [address] [address] [phone]  9/1/2024 - None Entered    [facility] [id number]         Subscriber Name Subscriber Birth Date Member ID       RAMIRO GONZALES 2005 [id number]                     Emergency Contacts        (Rel.) Home Phone Work Phone Mobile Phone    [address] (Other) [phone] -- --          RETURN FAX:  [phone]    PLEASE ATTACH THIS DISCHARGE " INFORMATION TO AUTHORIZATION # [id number]      RE:  RAMIRO GONZALES  :  2005  PLEASE SEE DEMOGRAPHICS FOR ADDITIONAL INFORMATION    FACILITY:  [facility]  NPI:  [id number]   TAX ID:  [id number]  ADDRESS: [address] [place] [address]  ATTENDING MD:  DR AVEL MUNIZ  NPI:  [id number]  ADDRESS IS THE SAME AS FACILITY       ADMISSION DATE:  2024   DISCHARGE DATE:  2024    Discharge Diagnosis:  Principal Problem:    Suicidal behavior  Active Problems:    Bipolar II disorder (F31.81)    Alcohol use disorder, severe, dependence (F10.20)    DM (diabetes mellitus), type 2    AFTERCARE:   [facility] [address]  [facility] [place] [id number] [phone]     Return at discharge.    DISCHARGE SUMMARY:     Avel Muniz MD   Physician  Psychiatry     Discharge Summary      Signed     Date of Service: 24  Creation Time: 24     Signed         :  2005  MRN:  [id number]  Visit Number:  [id number]        Date of Admission:2024   Date of Discharge:  2024     Discharge Diagnosis:  Principal Problem:    Suicidal behavior  Active Problems:    Bipolar II disorder    Alcohol use disorder, severe, dependence    DM (diabetes mellitus), type 2           Admission Diagnosis:  Suicidal behavior [R45.89]           RAY Gonzales is a 19 y.o. male who was admitted on 2024 with complaints of depression.  Patient states he came from [facility] because he was having suicidal ideation.   For details please see H&P dated 24.      Hospital Course  Patient is a 19 y.o. male presented with worsening depression and suicidal ideations. The patient was admitted to the [facility] adult psych unit for safety, further evaluation and treatment.  The patient was continued on the medications he was recently started on. He was able to take the medications without any adverse effects. His insulin dose was adjusted to glargine 35 U bid as his blood glucose levels  "were elevated. He was encouraged to adhere to his dietary restrictions and to choose healthy lifestyle including regular exercise, sleep hygiene and appropriate diet.   The patient was also able to take part in individual and group counseling sessions and work on appropriate coping skills.  The patient made steady improvement in his mood and expressed feeling more positive and hopeful about future. Sleep and appetite were improved.  The day of discharge the patient was calm, cooperative and pleasant. Mood was reported to be good, and denied SI/HI/AVH. Also reported no medication side effects.         Mental Status Exam upon discharge:   Mood \"good\"   Affect-congruent, appropriate, stable  Thought Content-goal directed, no delusional material present  Thought process-linear, organized.  Suicidality: No SI  Homicidality: No HI  Perception: No AH/VH     Procedures Performed        Consults:   Consults         No orders found from 8/27/2024 to 9/26/2024.                Pertinent Test Results:           Admission on 09/25/2024   Component Date Value Ref Range Status    Hepatitis B Surface Ag 09/25/2024 Non-Reactive  Non-Reactive Final    Hep A IgM 09/25/2024 Non-Reactive  Non-Reactive Final    Hep B C IgM 09/25/2024 Non-Reactive  Non-Reactive Final    Hepatitis C Ab 09/25/2024 Non-Reactive  Non-Reactive Final    QT Interval 09/26/2024 414  ms Final    QTC Interval 09/26/2024 453  ms Final    Glucose 09/26/2024 276 (H)  70 - 130 mg/dL Final    Glucose 09/26/2024 299 (H)  70 - 130 mg/dL Final    Glucose 09/26/2024 321 (H)  70 - 130 mg/dL Final    Glucose 09/26/2024 299 (H)  70 - 130 mg/dL Final    Glucose 09/27/2024 362 (H)  70 - 130 mg/dL Final    Glucose 09/27/2024 514 (C)  70 - 130 mg/dL Final    Glucose 09/27/2024 436 (C)  70 - 130 mg/dL Final    Glucose 09/27/2024 421 (C)  70 - 130 mg/dL Final    Glucose 09/27/2024 287 (H)  70 - 130 mg/dL Final    Glucose 09/27/2024 273 (H)  70 - 130 mg/dL Final    Glucose 09/27/2024 " 299 (H)  70 - 130 mg/dL Final    Glucose 09/28/2024 293 (H)  70 - 130 mg/dL Final    Glucose 09/28/2024 326 (H)  70 - 130 mg/dL Final    Glucose 09/28/2024 191 (H)  70 - 130 mg/dL Final    Glucose 09/28/2024 282 (H)  70 - 130 mg/dL Final    Glucose 09/29/2024 383 (H)  70 - 130 mg/dL Final    Glucose 09/29/2024 356 (H)  70 - 130 mg/dL Final    Glucose 09/29/2024 338 (H)  70 - 130 mg/dL Final    Glucose 09/29/2024 310 (H)  70 - 130 mg/dL Final    Glucose 09/29/2024 350 (H)  70 - 130 mg/dL Final    Glucose 09/30/2024 223 (H)  70 - 130 mg/dL Final   Admission on 09/25/2024, Discharged on 09/25/2024   Component Date Value Ref Range Status    Glucose 09/25/2024 383 (H)  65 - 99 mg/dL Final    BUN 09/25/2024 11  6 - 20 mg/dL Final    Creatinine 09/25/2024 0.70 (L)  0.76 - 1.27 mg/dL Final    Sodium 09/25/2024 136  136 - 145 mmol/L Final    Potassium 09/25/2024 4.1  3.5 - 5.2 mmol/L Final     Slight hemolysis detected by analyzer. Result may be falsely elevated.    Chloride 09/25/2024 103  98 - 107 mmol/L Final    CO2 09/25/2024 21.2 (L)  22.0 - 29.0 mmol/L Final    Calcium 09/25/2024 9.4  8.6 - 10.5 mg/dL Final    Total Protein 09/25/2024 7.7  6.0 - 8.5 g/dL Final    Albumin 09/25/2024 4.3  3.5 - 5.2 g/dL Final    ALT (SGPT) 09/25/2024 48 (H)  1 - 41 U/L Final    AST (SGOT) 09/25/2024 38  1 - 40 U/L Final    Alkaline Phosphatase 09/25/2024 135 (H)  39 - 117 U/L Final    Total Bilirubin 09/25/2024 0.8  0.0 - 1.2 mg/dL Final    Globulin 09/25/2024 3.4  gm/dL Final    A/G Ratio 09/25/2024 1.3  g/dL Final    BUN/Creatinine Ratio 09/25/2024 15.7  7.0 - 25.0 Final    Anion Gap 09/25/2024 11.8  5.0 - 15.0 mmol/L Final    eGFR 09/25/2024 134.4  >60.0 mL/min/1.73 Final    Color, UA 09/25/2024 Yellow  Yellow, Straw Final    Appearance, UA 09/25/2024 Clear  Clear Final    pH, UA 09/25/2024 7.0  5.0 - 8.0 Final    Specific Gravity, UA 09/25/2024 >1.030 (H)  1.005 - 1.030 Final    Glucose, UA 09/25/2024 >=1000 mg/dL (3+) (A)  Negative  Final    Ketones, UA 09/25/2024 Negative  Negative Final    Bilirubin, UA 09/25/2024 Negative  Negative Final    Blood, UA 09/25/2024 Negative  Negative Final    Protein, UA 09/25/2024 Trace (A)  Negative Final    Leuk Esterase, UA 09/25/2024 Negative  Negative Final    Nitrite, UA 09/25/2024 Negative  Negative Final    Urobilinogen, UA 09/25/2024 1.0 E.U./dL  0.2 - 1.0 E.U./dL Final    THC, Screen, Urine 09/25/2024 Negative  Negative Final    Phencyclidine (PCP), Urine 09/25/2024 Negative  Negative Final    Cocaine Screen, Urine 09/25/2024 Negative  Negative Final    Methamphetamine, Ur 09/25/2024 Negative  Negative Final    Opiate Screen 09/25/2024 Negative  Negative Final    Amphetamine Screen, Urine 09/25/2024 Negative  Negative Final    Benzodiazepine Screen, Urine 09/25/2024 Negative  Negative Final    Tricyclic Antidepressants Screen 09/25/2024 Negative  Negative Final    Methadone Screen, Urine 09/25/2024 Negative  Negative Final    Barbiturates Screen, Urine 09/25/2024 Negative  Negative Final    Oxycodone Screen, Urine 09/25/2024 Negative  Negative Final    Buprenorphine, Screen, Urine 09/25/2024 Negative  Negative Final    Magnesium 09/25/2024 2.0  1.6 - 2.6 mg/dL Final    Ethanol 09/25/2024 <10  0 - 10 mg/dL Final    Ethanol % 09/25/2024 <0.010  % Final    WBC 09/25/2024 4.34  3.40 - 10.80 10*3/mm3 Final    RBC 09/25/2024 4.38  4.14 - 5.80 10*6/mm3 Final    Hemoglobin 09/25/2024 14.1  13.0 - 17.7 g/dL Final    Hematocrit 09/25/2024 39.8  37.5 - 51.0 % Final    MCV 09/25/2024 90.9  79.0 - 97.0 fL Final    MCH 09/25/2024 32.2  26.6 - 33.0 pg Final    MCHC 09/25/2024 35.4  31.5 - 35.7 g/dL Final    RDW 09/25/2024 12.8  12.3 - 15.4 % Final    RDW-SD 09/25/2024 42.1  37.0 - 54.0 fl Final    MPV 09/25/2024 10.0  6.0 - 12.0 fL Final    Platelets 09/25/2024 112 (L)  140 - 450 10*3/mm3 Final    Neutrophil % 09/25/2024 46.5  42.7 - 76.0 % Final    Lymphocyte % 09/25/2024 40.6  19.6 - 45.3 % Final    Monocyte %  09/25/2024 10.4  5.0 - 12.0 % Final    Eosinophil % 09/25/2024 1.8  0.3 - 6.2 % Final    Basophil % 09/25/2024 0.2  0.0 - 1.5 % Final    Immature Grans % 09/25/2024 0.5  0.0 - 0.5 % Final    Neutrophils, Absolute 09/25/2024 2.02  1.70 - 7.00 10*3/mm3 Final    Lymphocytes, Absolute 09/25/2024 1.76  0.70 - 3.10 10*3/mm3 Final    Monocytes, Absolute 09/25/2024 0.45  0.10 - 0.90 10*3/mm3 Final    Eosinophils, Absolute 09/25/2024 0.08  0.00 - 0.40 10*3/mm3 Final    Basophils, Absolute 09/25/2024 0.01  0.00 - 0.20 10*3/mm3 Final    Immature Grans, Absolute 09/25/2024 0.02  0.00 - 0.05 10*3/mm3 Final    nRBC 09/25/2024 0.0  0.0 - 0.2 /100 WBC Final    Fentanyl, Urine 09/25/2024 Negative  Negative Final   Admission on 09/25/2024, Discharged on 09/25/2024   Component Date Value Ref Range Status    Glucose 09/25/2024 463 (C)  65 - 99 mg/dL Final    BUN 09/25/2024 11  6 - 20 mg/dL Final    Creatinine 09/25/2024 0.78  0.76 - 1.27 mg/dL Final    Sodium 09/25/2024 135 (L)  136 - 145 mmol/L Final    Potassium 09/25/2024 4.4  3.5 - 5.2 mmol/L Final     Slight hemolysis detected by analyzer. Result may be falsely elevated.    Chloride 09/25/2024 103  98 - 107 mmol/L Final    CO2 09/25/2024 15.2 (L)  22.0 - 29.0 mmol/L Final    Calcium 09/25/2024 9.2  8.6 - 10.5 mg/dL Final    Total Protein 09/25/2024 7.4  6.0 - 8.5 g/dL Final    Albumin 09/25/2024 3.8  3.5 - 5.2 g/dL Final    ALT (SGPT) 09/25/2024 47 (H)  1 - 41 U/L Final    AST (SGOT) 09/25/2024 43 (H)  1 - 40 U/L Final    Alkaline Phosphatase 09/25/2024 142 (H)  39 - 117 U/L Final    Total Bilirubin 09/25/2024 0.9  0.0 - 1.2 mg/dL Final    Globulin 09/25/2024 3.6  gm/dL Final    A/G Ratio 09/25/2024 1.1  g/dL Final    BUN/Creatinine Ratio 09/25/2024 14.1  7.0 - 25.0 Final    Anion Gap 09/25/2024 16.8 (H)  5.0 - 15.0 mmol/L Final    eGFR 09/25/2024 130.1  >60.0 mL/min/1.73 Final    Color, UA 09/25/2024 Yellow  Yellow, Straw Final    Appearance, UA 09/25/2024 Clear  Clear Final     pH, UA 09/25/2024 6.5  5.0 - 8.0 Final    Specific Gravity, UA 09/25/2024 >1.030 (H)  1.005 - 1.030 Final    Glucose, UA 09/25/2024 >=1000 mg/dL (3+) (A)  Negative Final    Ketones, UA 09/25/2024 Negative  Negative Final    Bilirubin, UA 09/25/2024 Negative  Negative Final    Blood, UA 09/25/2024 Negative  Negative Final    Protein, UA 09/25/2024 Negative  Negative Final    Leuk Esterase, UA 09/25/2024 Negative  Negative Final    Nitrite, UA 09/25/2024 Negative  Negative Final    Urobilinogen, UA 09/25/2024 0.2 E.U./dL  0.2 - 1.0 E.U./dL Final    THC, Screen, Urine 09/25/2024 Negative  Negative Final    Phencyclidine (PCP), Urine 09/25/2024 Negative  Negative Final    Cocaine Screen, Urine 09/25/2024 Negative  Negative Final    Methamphetamine, Ur 09/25/2024 Negative  Negative Final    Opiate Screen 09/25/2024 Negative  Negative Final    Amphetamine Screen, Urine 09/25/2024 Negative  Negative Final    Benzodiazepine Screen, Urine 09/25/2024 Negative  Negative Final    Tricyclic Antidepressants Screen 09/25/2024 Negative  Negative Final    Methadone Screen, Urine 09/25/2024 Negative  Negative Final    Barbiturates Screen, Urine 09/25/2024 Negative  Negative Final    Oxycodone Screen, Urine 09/25/2024 Negative  Negative Final    Buprenorphine, Screen, Urine 09/25/2024 Negative  Negative Final    Ethanol 09/25/2024 <10  0 - 10 mg/dL Final    Ethanol % 09/25/2024 <0.010  % Final    Magnesium 09/25/2024 2.0  1.6 - 2.6 mg/dL Final    WBC 09/25/2024 4.03  3.40 - 10.80 10*3/mm3 Final    RBC 09/25/2024 4.35  4.14 - 5.80 10*6/mm3 Final    Hemoglobin 09/25/2024 14.2  13.0 - 17.7 g/dL Final    Hematocrit 09/25/2024 41.0  37.5 - 51.0 % Final    MCV 09/25/2024 94.3  79.0 - 97.0 fL Final    MCH 09/25/2024 32.6  26.6 - 33.0 pg Final    MCHC 09/25/2024 34.6  31.5 - 35.7 g/dL Final    RDW 09/25/2024 12.7  12.3 - 15.4 % Final    RDW-SD 09/25/2024 43.8  37.0 - 54.0 fl Final    MPV 09/25/2024 10.6  6.0 - 12.0 fL Final    Platelets  09/25/2024 54 (L)  140 - 450 10*3/mm3 Final    Neutrophil % 09/25/2024 54.1  42.7 - 76.0 % Final    Lymphocyte % 09/25/2024 33.3  19.6 - 45.3 % Final    Monocyte % 09/25/2024 10.2  5.0 - 12.0 % Final    Eosinophil % 09/25/2024 2.0  0.3 - 6.2 % Final    Basophil % 09/25/2024 0.2  0.0 - 1.5 % Final    Immature Grans % 09/25/2024 0.2  0.0 - 0.5 % Final    Neutrophils, Absolute 09/25/2024 2.18  1.70 - 7.00 10*3/mm3 Final    Lymphocytes, Absolute 09/25/2024 1.34  0.70 - 3.10 10*3/mm3 Final    Monocytes, Absolute 09/25/2024 0.41  0.10 - 0.90 10*3/mm3 Final    Eosinophils, Absolute 09/25/2024 0.08  0.00 - 0.40 10*3/mm3 Final    Basophils, Absolute 09/25/2024 0.01  0.00 - 0.20 10*3/mm3 Final    Immature Grans, Absolute 09/25/2024 0.01  0.00 - 0.05 10*3/mm3 Final    nRBC 09/25/2024 0.0  0.0 - 0.2 /100 WBC Final    Fentanyl, Urine 09/25/2024 Negative  Negative Final    RBC Morphology 09/25/2024 Normal  Normal Final    Clumped Platelets 09/25/2024 Present  None Seen Final    Acetone 09/25/2024 Negative  Negative Final            Condition on Discharge:  improved     Vital Signs  Temp:  [97.1 °F (36.2 °C)-97.2 °F (36.2 °C)] 97.2 °F (36.2 °C)  Heart Rate:  [74-83] 74  Resp:  [18-20] 18  BP: (127-131)/(60-69) 127/60        Discharge Disposition:  Rehab Facility or Unit (DC - External)     Discharge Medications:      Discharge Medications          Changes to Medications         Instructions Start Date   Lantus SoloStar 100 UNIT/ML injection pen  Generic drug: Insulin Glargine  What changed: how much to take    35 Units, Subcutaneous, 2 Times Daily                  Continue These Medications         Instructions Start Date   hydrOXYzine 25 MG tablet  Commonly known as: ATARAX    25 mg, Oral, Every 4 Hours PRN        lamoTRIgine 25 MG tablet  Commonly known as: LaMICtal    Take 1 tablet by mouth Daily for 14 days, THEN 2 tablets Daily for 14 days. Indications: Manic-Depression    Start Date: September 24, 2024      Lurasidone HCl 20  MG tablet tablet  Commonly known as: LATUDA    20 mg, Oral, Daily With Dinner        metFORMIN 500 MG tablet  Commonly known as: GLUCOPHAGE    500 mg, Oral, 2 Times Daily With Meals        traZODone 50 MG tablet  Commonly known as: DESYREL    50 mg, Oral, Nightly                     Discharge Diet: Consistent carbohydrate      Activity at Discharge: As tolerated      Follow-up Appointments         Future Appointments   Date Time Provider Department Center   10/1/2024 11:30 AM Mirta Srinivasan MD [facility]        Time: I spent  > 30  minutes on this discharge activity which included: face-to-face encounter with the patient, reviewing the data in the system, coordination of the care with the nursing staff as well as consultants, documentation, and entering orders.          Clinician:   Ashley Thomas MD  09/30/24  10:54 EDT

## 2024-10-01 ENCOUNTER — OFFICE VISIT (OUTPATIENT)
Age: 19
End: 2024-10-01
Payer: COMMERCIAL

## 2024-10-01 VITALS — BODY MASS INDEX: 46.06 KG/M2 | HEIGHT: 69 IN | WEIGHT: 311 LBS

## 2024-10-01 DIAGNOSIS — K82.8 BILIARY DYSKINESIA: Primary | ICD-10-CM

## 2024-10-02 PROBLEM — K82.8 BILIARY DYSKINESIA: Status: ACTIVE | Noted: 2024-10-01

## 2024-10-02 RX ORDER — INDOCYANINE GREEN AND WATER 25 MG
2.5 KIT INJECTION ONCE
OUTPATIENT
Start: 2024-10-02 | End: 2024-10-02

## 2024-10-02 RX ORDER — SODIUM CHLORIDE 0.9 % (FLUSH) 0.9 %
3 SYRINGE (ML) INJECTION EVERY 12 HOURS SCHEDULED
OUTPATIENT
Start: 2024-10-02

## 2024-10-02 RX ORDER — SODIUM CHLORIDE 0.9 % (FLUSH) 0.9 %
10 SYRINGE (ML) INJECTION AS NEEDED
OUTPATIENT
Start: 2024-10-02

## 2024-10-02 RX ORDER — SODIUM CHLORIDE 9 MG/ML
40 INJECTION, SOLUTION INTRAVENOUS AS NEEDED
OUTPATIENT
Start: 2024-10-02

## 2024-10-02 NOTE — PROGRESS NOTES
"Date of Service: 10/2/2024    Subjective   Ajay Xie is a 19 y.o. male is being in consultation today at the request of Provider, No Known    Chief Complaint: Right upper quadrant abdominal pain    Ajay Xie is a 19 y.o. male who presents to clinic with persistent right upper quadrant abdominal pain.  He reports this is mainly after eating greasy and fatty foods.  He did have a right upper quadrant ultrasound which showed a contracted gallbladder.    Past Medical History:   Diagnosis Date    Anxiety     Bipolar disorder     Depression     Diabetes     HTN (hypertension)     Suicidal behavior        History reviewed. No pertinent surgical history.      History reviewed. No pertinent family history.      Social History     Socioeconomic History    Marital status: Single    Years of education: 12    Highest education level: High school graduate   Tobacco Use    Smoking status: Never    Smokeless tobacco: Never   Vaping Use    Vaping status: Former    Substances: Nicotine    Devices: Disposable   Substance and Sexual Activity    Alcohol use: Not Currently    Drug use: Never    Sexual activity: Not Currently                Review of Systems   Pertinent items are noted in HPI     Constitutional: No fevers, chills or malaise. No unintentional weight loss   Eyes: Denies visual changes    Cardiovascular: Denies chest pain, palpitations   Respiratory: Denies cough or shortness of breath   Abdominal/Gastrointestinal: No abdominal pain, no nausea/vomiting   Genitourinary: Denies dysuria or hematuria   Musculoskeletal: Denies any chronic joint aches, pains or deformities              Skin: No lesions or rashes   Psychiatric: No recent mood changes   Neurologic: No paresthesias or loss of function        Objective       Physical Exam:      10/01/24  1117   Weight: (!) 141 kg (311 lb)    Body mass index is 45.91 kg/m².  Constitution: Ht 175.3 cm (69.02\")   Wt (!) 141 kg (311 lb)   BMI 45.91 kg/m²  . No acute " distress  Head: Normocephalic, atraumatic.   Eyes: Aligned without strabismus. Conjunctivae noninjected   Ears, Nose, Mouth:no lesions noted  CV: Regular rate and rhythm   Respiratory: Symmetric chest expansion. No respiratory distress.   Gastrointestinal:  Soft, nontender, nondistended   Skin:  No cyanosis, clubbing or edema bilaterally  Neurologic: No gross deficits.  Alert and oriented x3  Psychiatric: Normal mood          Assessment   Diagnoses and all orders for this visit:    1. Biliary dyskinesia (Primary)  -     Case Request; Standing  -     Follow Anesthesia Guidelines / Protocol; Standing  -     Verify NPO Status; Standing  -     Obtain Informed Consent; Standing  -     Verify / Perform Chlorhexidine Skin Prep; Standing  -     Notify Physician - Standard; Standing  -     Instructions on coughing, deep breathing, and incentive spirometry.; Standing  -     Insert Peripheral IV; Standing  -     Saline Lock & Maintain IV Access; Standing  -     sodium chloride 0.9 % flush 3 mL  -     sodium chloride 0.9 % flush 10 mL  -     sodium chloride 0.9 % infusion 40 mL  -     Place Sequential Compression Device; Standing  -     Maintain Sequential Compression Device; Standing  -     ceFAZolin 3000 mg IVPB in 100 mL NS (VTB)  -     indocyanine green (IC-GREEN) injection 2.5 mg  -     Case Request      Ajay Xie is a 19 y.o. male with symptoms consistent with biliary dyskinesia.  We will plan for a robotic cholecystectomy on October 23.  And benefits were discussed and he elected to proceed.         Mirta Mccain MD  [facility]

## 2024-10-03 ENCOUNTER — HOSPITAL ENCOUNTER (EMERGENCY)
Facility: HOSPITAL | Age: 19
Discharge: PSYCHIATRIC OR SUBSTANCE USE FACILITY | DRG: 885 | End: 2024-10-03
Attending: EMERGENCY MEDICINE
Payer: COMMERCIAL

## 2024-10-03 ENCOUNTER — HOSPITAL ENCOUNTER (INPATIENT)
Facility: HOSPITAL | Age: 19
LOS: 4 days | Discharge: HOME | DRG: 885 | End: 2024-10-07
Attending: STUDENT IN AN ORGANIZED HEALTH CARE EDUCATION/TRAINING PROGRAM | Admitting: STUDENT IN AN ORGANIZED HEALTH CARE EDUCATION/TRAINING PROGRAM
Payer: COMMERCIAL

## 2024-10-03 VITALS
WEIGHT: 311 LBS | DIASTOLIC BLOOD PRESSURE: 76 MMHG | SYSTOLIC BLOOD PRESSURE: 142 MMHG | HEIGHT: 69 IN | BODY MASS INDEX: 46.06 KG/M2 | OXYGEN SATURATION: 93 % | HEART RATE: 99 BPM | RESPIRATION RATE: 18 BRPM | TEMPERATURE: 97.9 F

## 2024-10-03 DIAGNOSIS — E11.65 HYPERGLYCEMIA DUE TO DIABETES MELLITUS: Primary | ICD-10-CM

## 2024-10-03 DIAGNOSIS — R45.851 SUICIDAL IDEATIONS: ICD-10-CM

## 2024-10-03 LAB
ACETONE BLD QL: NEGATIVE
ALBUMIN SERPL-MCNC: 3.9 G/DL (ref 3.5–5.2)
ALBUMIN/GLOB SERPL: 1.2 G/DL
ALP SERPL-CCNC: 133 U/L (ref 39–117)
ALT SERPL W P-5'-P-CCNC: 7 U/L (ref 1–41)
AMPHET+METHAMPHET UR QL: NEGATIVE
AMPHETAMINES UR QL: NEGATIVE
ANION GAP SERPL CALCULATED.3IONS-SCNC: 14.7 MMOL/L (ref 5–15)
ANISOCYTOSIS BLD QL: NORMAL
AST SERPL-CCNC: 36 U/L (ref 1–40)
BARBITURATES UR QL SCN: NEGATIVE
BASOPHILS # BLD AUTO: 0.02 10*3/MM3 (ref 0–0.2)
BASOPHILS NFR BLD AUTO: 0.5 % (ref 0–1.5)
BENZODIAZ UR QL SCN: NEGATIVE
BILIRUB SERPL-MCNC: 0.6 MG/DL (ref 0–1.2)
BILIRUB UR QL STRIP: NEGATIVE
BUN SERPL-MCNC: 16 MG/DL (ref 6–20)
BUN/CREAT SERPL: 23.5 (ref 7–25)
BUPRENORPHINE SERPL-MCNC: NEGATIVE NG/ML
CALCIUM SPEC-SCNC: 9.4 MG/DL (ref 8.6–10.5)
CANNABINOIDS SERPL QL: NEGATIVE
CHLORIDE SERPL-SCNC: 104 MMOL/L (ref 98–107)
CLARITY UR: CLEAR
CO2 SERPL-SCNC: 19.3 MMOL/L (ref 22–29)
COCAINE UR QL: NEGATIVE
COLOR UR: YELLOW
CREAT SERPL-MCNC: 0.68 MG/DL (ref 0.76–1.27)
DEPRECATED RDW RBC AUTO: 43.9 FL (ref 37–54)
EGFRCR SERPLBLD CKD-EPI 2021: 135.6 ML/MIN/1.73
EOSINOPHIL # BLD AUTO: 0.12 10*3/MM3 (ref 0–0.4)
EOSINOPHIL NFR BLD AUTO: 2.9 % (ref 0.3–6.2)
ERYTHROCYTE [DISTWIDTH] IN BLOOD BY AUTOMATED COUNT: 13 % (ref 12.3–15.4)
ETHANOL BLD-MCNC: <10 MG/DL (ref 0–10)
ETHANOL UR QL: <0.01 %
FENTANYL UR-MCNC: NEGATIVE NG/ML
GLOBULIN UR ELPH-MCNC: 3.3 GM/DL
GLUCOSE BLDC GLUCOMTR-MCNC: 298 MG/DL (ref 70–130)
GLUCOSE BLDC GLUCOMTR-MCNC: 344 MG/DL (ref 70–130)
GLUCOSE BLDC GLUCOMTR-MCNC: 349 MG/DL (ref 70–130)
GLUCOSE SERPL-MCNC: 378 MG/DL (ref 65–99)
GLUCOSE UR STRIP-MCNC: ABNORMAL MG/DL
HCT VFR BLD AUTO: 38.8 % (ref 37.5–51)
HGB BLD-MCNC: 14.4 G/DL (ref 13–17.7)
HGB UR QL STRIP.AUTO: NEGATIVE
HOLD SPECIMEN: NORMAL
HOLD SPECIMEN: NORMAL
IMM GRANULOCYTES # BLD AUTO: 0.01 10*3/MM3 (ref 0–0.05)
IMM GRANULOCYTES NFR BLD AUTO: 0.2 % (ref 0–0.5)
KETONES UR QL STRIP: ABNORMAL
LEUKOCYTE ESTERASE UR QL STRIP.AUTO: NEGATIVE
LYMPHOCYTES # BLD AUTO: 1.32 10*3/MM3 (ref 0.7–3.1)
LYMPHOCYTES NFR BLD AUTO: 31.4 % (ref 19.6–45.3)
MAGNESIUM SERPL-MCNC: 1.6 MG/DL (ref 1.6–2.6)
MCH RBC QN AUTO: 34.3 PG (ref 26.6–33)
MCHC RBC AUTO-ENTMCNC: 37.1 G/DL (ref 31.5–35.7)
MCV RBC AUTO: 92.4 FL (ref 79–97)
METHADONE UR QL SCN: NEGATIVE
MONOCYTES # BLD AUTO: 0.44 10*3/MM3 (ref 0.1–0.9)
MONOCYTES NFR BLD AUTO: 10.5 % (ref 5–12)
NEUTROPHILS NFR BLD AUTO: 2.3 10*3/MM3 (ref 1.7–7)
NEUTROPHILS NFR BLD AUTO: 54.5 % (ref 42.7–76)
NITRITE UR QL STRIP: NEGATIVE
NRBC BLD AUTO-RTO: 0 /100 WBC (ref 0–0.2)
OPIATES UR QL: NEGATIVE
OXYCODONE UR QL SCN: NEGATIVE
PCP UR QL SCN: NEGATIVE
PH UR STRIP.AUTO: 5.5 [PH] (ref 5–8)
PLAT MORPH BLD: NORMAL
PLATELET # BLD AUTO: 133 10*3/MM3 (ref 140–450)
PMV BLD AUTO: 10.5 FL (ref 6–12)
POTASSIUM SERPL-SCNC: 4.4 MMOL/L (ref 3.5–5.2)
PROT SERPL-MCNC: 7.2 G/DL (ref 6–8.5)
PROT UR QL STRIP: NEGATIVE
RBC # BLD AUTO: 4.2 10*6/MM3 (ref 4.14–5.8)
SODIUM SERPL-SCNC: 138 MMOL/L (ref 136–145)
SP GR UR STRIP: >1.03 (ref 1–1.03)
TRICYCLICS UR QL SCN: NEGATIVE
UROBILINOGEN UR QL STRIP: ABNORMAL
WBC NRBC COR # BLD AUTO: 4.21 10*3/MM3 (ref 3.4–10.8)
WHOLE BLOOD HOLD COAG: NORMAL
WHOLE BLOOD HOLD SPECIMEN: NORMAL

## 2024-10-03 PROCEDURE — 63710000001 INSULIN REGULAR HUMAN PER 5 UNITS: Performed by: EMERGENCY MEDICINE

## 2024-10-03 PROCEDURE — 82948 REAGENT STRIP/BLOOD GLUCOSE: CPT

## 2024-10-03 PROCEDURE — 80074 ACUTE HEPATITIS PANEL: CPT | Performed by: STUDENT IN AN ORGANIZED HEALTH CARE EDUCATION/TRAINING PROGRAM

## 2024-10-03 PROCEDURE — 36415 COLL VENOUS BLD VENIPUNCTURE: CPT

## 2024-10-03 PROCEDURE — 99285 EMERGENCY DEPT VISIT HI MDM: CPT

## 2024-10-03 PROCEDURE — 80307 DRUG TEST PRSMV CHEM ANLYZR: CPT | Performed by: PHYSICIAN ASSISTANT

## 2024-10-03 PROCEDURE — 93005 ELECTROCARDIOGRAM TRACING: CPT | Performed by: PHYSICIAN ASSISTANT

## 2024-10-03 PROCEDURE — 85007 BL SMEAR W/DIFF WBC COUNT: CPT | Performed by: PHYSICIAN ASSISTANT

## 2024-10-03 PROCEDURE — 82009 KETONE BODYS QUAL: CPT | Performed by: PHYSICIAN ASSISTANT

## 2024-10-03 PROCEDURE — 63710000001 INSULIN REGULAR HUMAN PER 5 UNITS: Performed by: PHYSICIAN ASSISTANT

## 2024-10-03 PROCEDURE — 85025 COMPLETE CBC W/AUTO DIFF WBC: CPT | Performed by: PHYSICIAN ASSISTANT

## 2024-10-03 PROCEDURE — 80053 COMPREHEN METABOLIC PANEL: CPT | Performed by: PHYSICIAN ASSISTANT

## 2024-10-03 PROCEDURE — 82077 ASSAY SPEC XCP UR&BREATH IA: CPT | Performed by: PHYSICIAN ASSISTANT

## 2024-10-03 PROCEDURE — 81003 URINALYSIS AUTO W/O SCOPE: CPT | Performed by: PHYSICIAN ASSISTANT

## 2024-10-03 PROCEDURE — 83735 ASSAY OF MAGNESIUM: CPT | Performed by: EMERGENCY MEDICINE

## 2024-10-03 PROCEDURE — 93005 ELECTROCARDIOGRAM TRACING: CPT | Performed by: STUDENT IN AN ORGANIZED HEALTH CARE EDUCATION/TRAINING PROGRAM

## 2024-10-03 RX ORDER — ALUMINA, MAGNESIA, AND SIMETHICONE 2400; 2400; 240 MG/30ML; MG/30ML; MG/30ML
15 SUSPENSION ORAL EVERY 6 HOURS PRN
Status: DISCONTINUED | OUTPATIENT
Start: 2024-10-03 | End: 2024-10-07 | Stop reason: HOSPADM

## 2024-10-03 RX ORDER — BENZONATATE 100 MG/1
100 CAPSULE ORAL 3 TIMES DAILY PRN
Status: DISCONTINUED | OUTPATIENT
Start: 2024-10-03 | End: 2024-10-07 | Stop reason: HOSPADM

## 2024-10-03 RX ORDER — ACETAMINOPHEN 325 MG/1
650 TABLET ORAL EVERY 6 HOURS PRN
Status: DISCONTINUED | OUTPATIENT
Start: 2024-10-03 | End: 2024-10-07 | Stop reason: HOSPADM

## 2024-10-03 RX ORDER — HYDROXYZINE HYDROCHLORIDE 50 MG/1
50 TABLET, FILM COATED ORAL EVERY 6 HOURS PRN
Status: DISCONTINUED | OUTPATIENT
Start: 2024-10-03 | End: 2024-10-07 | Stop reason: HOSPADM

## 2024-10-03 RX ORDER — GLUCAGON 1 MG/ML
1 KIT INJECTION
Status: DISCONTINUED | OUTPATIENT
Start: 2024-10-03 | End: 2024-10-07 | Stop reason: HOSPADM

## 2024-10-03 RX ORDER — LOPERAMIDE HCL 2 MG
2 CAPSULE ORAL
Status: DISCONTINUED | OUTPATIENT
Start: 2024-10-03 | End: 2024-10-07 | Stop reason: HOSPADM

## 2024-10-03 RX ORDER — ECHINACEA PURPUREA EXTRACT 125 MG
2 TABLET ORAL AS NEEDED
Status: DISCONTINUED | OUTPATIENT
Start: 2024-10-03 | End: 2024-10-07 | Stop reason: HOSPADM

## 2024-10-03 RX ORDER — ONDANSETRON 4 MG/1
4 TABLET, ORALLY DISINTEGRATING ORAL EVERY 6 HOURS PRN
Status: DISCONTINUED | OUTPATIENT
Start: 2024-10-03 | End: 2024-10-07 | Stop reason: HOSPADM

## 2024-10-03 RX ORDER — FAMOTIDINE 20 MG/1
20 TABLET, FILM COATED ORAL 2 TIMES DAILY PRN
Status: DISCONTINUED | OUTPATIENT
Start: 2024-10-03 | End: 2024-10-07 | Stop reason: HOSPADM

## 2024-10-03 RX ORDER — POLYETHYLENE GLYCOL 3350 17 G/17G
17 POWDER, FOR SOLUTION ORAL DAILY PRN
Status: DISCONTINUED | OUTPATIENT
Start: 2024-10-03 | End: 2024-10-07 | Stop reason: HOSPADM

## 2024-10-03 RX ORDER — BENZTROPINE MESYLATE 1 MG/ML
1 INJECTION, SOLUTION INTRAMUSCULAR; INTRAVENOUS ONCE AS NEEDED
Status: DISCONTINUED | OUTPATIENT
Start: 2024-10-03 | End: 2024-10-07 | Stop reason: HOSPADM

## 2024-10-03 RX ORDER — INSULIN LISPRO 100 [IU]/ML
3-14 INJECTION, SOLUTION INTRAVENOUS; SUBCUTANEOUS
Status: DISCONTINUED | OUTPATIENT
Start: 2024-10-04 | End: 2024-10-07 | Stop reason: HOSPADM

## 2024-10-03 RX ORDER — BENZTROPINE MESYLATE 1 MG/1
2 TABLET ORAL ONCE AS NEEDED
Status: DISCONTINUED | OUTPATIENT
Start: 2024-10-03 | End: 2024-10-07 | Stop reason: HOSPADM

## 2024-10-03 RX ORDER — SODIUM CHLORIDE 0.9 % (FLUSH) 0.9 %
10 SYRINGE (ML) INJECTION AS NEEDED
Status: DISCONTINUED | OUTPATIENT
Start: 2024-10-03 | End: 2024-10-03 | Stop reason: HOSPADM

## 2024-10-03 RX ORDER — IBUPROFEN 400 MG/1
400 TABLET, FILM COATED ORAL EVERY 6 HOURS PRN
Status: DISCONTINUED | OUTPATIENT
Start: 2024-10-03 | End: 2024-10-07 | Stop reason: HOSPADM

## 2024-10-03 RX ORDER — TRAZODONE HYDROCHLORIDE 50 MG/1
50 TABLET, FILM COATED ORAL NIGHTLY PRN
Status: DISCONTINUED | OUTPATIENT
Start: 2024-10-03 | End: 2024-10-07 | Stop reason: HOSPADM

## 2024-10-03 RX ORDER — DEXTROSE MONOHYDRATE 25 G/50ML
25 INJECTION, SOLUTION INTRAVENOUS
Status: DISCONTINUED | OUTPATIENT
Start: 2024-10-03 | End: 2024-10-07 | Stop reason: HOSPADM

## 2024-10-03 RX ORDER — NICOTINE POLACRILEX 4 MG
15 LOZENGE BUCCAL
Status: DISCONTINUED | OUTPATIENT
Start: 2024-10-03 | End: 2024-10-07 | Stop reason: HOSPADM

## 2024-10-03 RX ADMIN — INSULIN HUMAN 5 UNITS: 100 INJECTION, SOLUTION PARENTERAL at 21:00

## 2024-10-03 RX ADMIN — INSULIN HUMAN 5 UNITS: 100 INJECTION, SOLUTION PARENTERAL at 21:03

## 2024-10-03 RX ADMIN — INSULIN HUMAN 10 UNITS: 100 INJECTION, SOLUTION PARENTERAL at 19:18

## 2024-10-03 NOTE — ED PROVIDER NOTES
Subjective   History of Present Illness  19-year-old male with past medical history of anxiety, depression, bipolar disorder, diabetes, hypertension, and suicidal behavior presents to the emergency room for hyperglycemia.  Patient states that he has been a resident at [facility] but states that his sugar has been in the 3-400 range recently.  Patient states he has been taking his insulin, however it is not decreasing his blood sugar.  Patient states he would just like to go to the [facility] at this time.  He denies any suicidal or homicidal ideations.  He denies any current use of illicit drug or alcohol use.  Denies any other complaints or concerns at this time.    History provided by:  Patient   used: No        Review of Systems   Constitutional: Negative.  Negative for fever.   HENT: Negative.     Respiratory: Negative.     Cardiovascular: Negative.  Negative for chest pain.   Gastrointestinal: Negative.  Negative for abdominal pain.   Endocrine: Negative.         (+) hyperglycemia   Genitourinary: Negative.  Negative for dysuria.   Skin: Negative.    Neurological: Negative.    Psychiatric/Behavioral: Negative.     All other systems reviewed and are negative.      Past Medical History:   Diagnosis Date    Anxiety     Bipolar disorder     Depression     Diabetes     Gallstones     HTN (hypertension)     Self-injurious behavior     Suicidal behavior        No Known Allergies    Past Surgical History:   Procedure Laterality Date    EAR TUBES         History reviewed. No pertinent family history.    Social History     Socioeconomic History    Marital status: Single    Years of education: 12    Highest education level: High school graduate   Tobacco Use    Smoking status: Never    Smokeless tobacco: Never   Vaping Use    Vaping status: Former    Substances: Nicotine    Devices: Disposable   Substance and Sexual Activity    Alcohol use: Not Currently    Drug use: Never    Sexual activity:  Not Currently           Objective   Physical Exam  Vitals and nursing note reviewed.   Constitutional:       General: He is not in acute distress.     Appearance: He is well-developed. He is not diaphoretic.   HENT:      Head: Normocephalic and atraumatic.      Right Ear: External ear normal.      Left Ear: External ear normal.      Nose: Nose normal.   Eyes:      Conjunctiva/sclera: Conjunctivae normal.      Pupils: Pupils are equal, round, and reactive to light.   Neck:      Vascular: No JVD.      Trachea: No tracheal deviation.   Cardiovascular:      Rate and Rhythm: Normal rate and regular rhythm.      Heart sounds: Normal heart sounds. No murmur heard.  Pulmonary:      Effort: Pulmonary effort is normal. No respiratory distress.      Breath sounds: Normal breath sounds. No wheezing.   Abdominal:      General: Bowel sounds are normal.      Palpations: Abdomen is soft.      Tenderness: There is no abdominal tenderness.   Musculoskeletal:         General: No deformity. Normal range of motion.      Cervical back: Normal range of motion and neck supple.   Skin:     General: Skin is warm and dry.      Coloration: Skin is not pale.      Findings: No erythema or rash.   Neurological:      Mental Status: He is alert and oriented to person, place, and time.      Cranial Nerves: No cranial nerve deficit.   Psychiatric:         Behavior: Behavior normal.         Thought Content: Thought content normal.         Procedures           ED Course  ED Course as of 10/04/24 0803   Thu Oct 03, 2024   1848 Acetone: Negative [TK]   2104 Pt has just informed that he is suicidal and plans to cut himself. [TK]   2136 Pt medically cleared for psych intake. [TK]   2218 Pt endorsed to Va Almeida NP.    Electronically signed by Gideon Joaquin PA-C, 10/03/24, 10:18 PM EDT.   [TK]   2231 Per Dr. Miramontes patient to have face-to-face evaluation in the morning. [KH]   2251 Patient went in restroom and intact department and cut his left  wrist with his fingernail.  There is a small superficial abrasion to his left wrist that does have some bleeding noted.  Due to the self-harm attempt patient to be admitted to the [facility]. [KH]      ED Course User Index  [KH] Va Almeida, APRN  [TK] Gideon Joaquin PA-C                                   Results for orders placed or performed during the hospital encounter of 10/03/24   Hepatitis Panel, Acute    Specimen: Blood   Result Value Ref Range    Hepatitis B Surface Ag Non-Reactive Non-Reactive    Hep A IgM Non-Reactive Non-Reactive    Hep B C IgM Non-Reactive Non-Reactive    Hepatitis C Ab Non-Reactive Non-Reactive   POC Glucose Once    Specimen: Blood   Result Value Ref Range    Glucose 313 (H) 70 - 130 mg/dL       No orders to display                 Medical Decision Making  19-year-old male with past medical history of anxiety, depression, bipolar disorder, diabetes, hypertension, and suicidal behavior presents to the emergency room for hyperglycemia.  Patient states that he has been a resident at [facility] but states that his sugar has been in the 3-400 range recently.  Patient states he has been taking his insulin, however it is not decreasing his blood sugar.  Patient states he would just like to go to the [facility] at this time.  He denies any suicidal or homicidal ideations.  He denies any current use of illicit drug or alcohol use.  Denies any other complaints or concerns at this time.      Problems Addressed:  Hyperglycemia due to diabetes mellitus: complicated acute illness or injury  Suicidal ideations: complicated acute illness or injury    Amount and/or Complexity of Data Reviewed  Labs: ordered. Decision-making details documented in ED Course.  ECG/medicine tests: ordered. Decision-making details documented in ED Course.    Risk  OTC drugs.  Prescription drug management.        Final diagnoses:   Hyperglycemia due to diabetes mellitus   Suicidal ideations        ED Disposition  ED Disposition       ED Disposition   DC/Transfer to Behavioral Health    Condition   Stable    Comment   --               No follow-up provider specified.       Medication List      No changes were made to your prescriptions during this visit.            Va Almeida, APRN  10/04/24 0803

## 2024-10-04 LAB
GLUCOSE BLDC GLUCOMTR-MCNC: 232 MG/DL (ref 70–130)
GLUCOSE BLDC GLUCOMTR-MCNC: 309 MG/DL (ref 70–130)
GLUCOSE BLDC GLUCOMTR-MCNC: 313 MG/DL (ref 70–130)
GLUCOSE BLDC GLUCOMTR-MCNC: 319 MG/DL (ref 70–130)
GLUCOSE BLDC GLUCOMTR-MCNC: 400 MG/DL (ref 70–130)
GLUCOSE BLDC GLUCOMTR-MCNC: 424 MG/DL (ref 70–130)
GLUCOSE BLDC GLUCOMTR-MCNC: 431 MG/DL (ref 70–130)
HAV IGM SERPL QL IA: NORMAL
HBV CORE IGM SERPL QL IA: NORMAL
HBV SURFACE AG SERPL QL IA: NORMAL
HCV AB SER QL: NORMAL
QT INTERVAL: 342 MS
QT INTERVAL: 370 MS
QTC INTERVAL: 440 MS
QTC INTERVAL: 452 MS

## 2024-10-04 PROCEDURE — 99223 1ST HOSP IP/OBS HIGH 75: CPT | Performed by: PSYCHIATRY & NEUROLOGY

## 2024-10-04 PROCEDURE — 63710000001 INSULIN LISPRO (HUMAN) PER 5 UNITS: Performed by: STUDENT IN AN ORGANIZED HEALTH CARE EDUCATION/TRAINING PROGRAM

## 2024-10-04 PROCEDURE — 82948 REAGENT STRIP/BLOOD GLUCOSE: CPT

## 2024-10-04 PROCEDURE — 63710000001 INSULIN GLARGINE PER 5 UNITS: Performed by: PSYCHIATRY & NEUROLOGY

## 2024-10-04 RX ORDER — FAMOTIDINE 20 MG/1
20 TABLET, FILM COATED ORAL 2 TIMES DAILY PRN
COMMUNITY

## 2024-10-04 RX ORDER — INSULIN LISPRO 100 [IU]/ML
14 INJECTION, SOLUTION INTRAVENOUS; SUBCUTANEOUS ONCE
Status: COMPLETED | OUTPATIENT
Start: 2024-10-04 | End: 2024-10-04

## 2024-10-04 RX ORDER — INSULIN GLARGINE 100 [IU]/ML
35 INJECTION, SOLUTION SUBCUTANEOUS 2 TIMES DAILY
COMMUNITY

## 2024-10-04 RX ORDER — INSULIN LISPRO 100 [IU]/ML
0-50 INJECTION, SOLUTION INTRAVENOUS; SUBCUTANEOUS DAILY
Status: ON HOLD | COMMUNITY
End: 2024-10-04

## 2024-10-04 RX ORDER — INSULIN LISPRO 100 [IU]/ML
0-25 INJECTION, SOLUTION INTRAVENOUS; SUBCUTANEOUS
Status: CANCELLED | OUTPATIENT
Start: 2024-10-04

## 2024-10-04 RX ORDER — LURASIDONE HYDROCHLORIDE 40 MG/1
20 TABLET, FILM COATED ORAL
Status: DISCONTINUED | OUTPATIENT
Start: 2024-10-04 | End: 2024-10-07 | Stop reason: HOSPADM

## 2024-10-04 RX ORDER — INSULIN LISPRO 100 [IU]/ML
0-25 INJECTION, SOLUTION INTRAVENOUS; SUBCUTANEOUS
COMMUNITY

## 2024-10-04 RX ORDER — LAMOTRIGINE 100 MG/1
25 TABLET ORAL DAILY
Status: COMPLETED | OUTPATIENT
Start: 2024-10-04 | End: 2024-10-06

## 2024-10-04 RX ORDER — HYDROXYZINE HYDROCHLORIDE 25 MG/1
25 TABLET, FILM COATED ORAL EVERY 4 HOURS PRN
Status: CANCELLED | OUTPATIENT
Start: 2024-10-04

## 2024-10-04 RX ADMIN — LAMOTRIGINE 25 MG: 100 TABLET ORAL at 11:05

## 2024-10-04 RX ADMIN — INSULIN GLARGINE 35 UNITS: 100 INJECTION, SOLUTION SUBCUTANEOUS at 20:44

## 2024-10-04 RX ADMIN — METFORMIN HYDROCHLORIDE 500 MG: 500 TABLET ORAL at 11:05

## 2024-10-04 RX ADMIN — HYDROXYZINE HYDROCHLORIDE 50 MG: 50 TABLET, FILM COATED ORAL at 20:44

## 2024-10-04 RX ADMIN — INSULIN GLARGINE 35 UNITS: 100 INJECTION, SOLUTION SUBCUTANEOUS at 11:05

## 2024-10-04 RX ADMIN — INSULIN LISPRO 14 UNITS: 100 INJECTION, SOLUTION INTRAVENOUS; SUBCUTANEOUS at 16:48

## 2024-10-04 RX ADMIN — METFORMIN HYDROCHLORIDE 500 MG: 500 TABLET ORAL at 17:30

## 2024-10-04 RX ADMIN — LURASIDONE HYDROCHLORIDE 20 MG: 40 TABLET, FILM COATED ORAL at 17:30

## 2024-10-04 RX ADMIN — INSULIN LISPRO 10 UNITS: 100 INJECTION, SOLUTION INTRAVENOUS; SUBCUTANEOUS at 07:13

## 2024-10-04 RX ADMIN — HYDROXYZINE HYDROCHLORIDE 50 MG: 50 TABLET, FILM COATED ORAL at 00:33

## 2024-10-04 RX ADMIN — TRAZODONE HYDROCHLORIDE 50 MG: 50 TABLET ORAL at 20:44

## 2024-10-04 RX ADMIN — INSULIN LISPRO 5 UNITS: 100 INJECTION, SOLUTION INTRAVENOUS; SUBCUTANEOUS at 20:44

## 2024-10-04 RX ADMIN — INSULIN LISPRO 14 UNITS: 100 INJECTION, SOLUTION INTRAVENOUS; SUBCUTANEOUS at 11:05

## 2024-10-04 RX ADMIN — INSULIN LISPRO 14 UNITS: 100 INJECTION, SOLUTION INTRAVENOUS; SUBCUTANEOUS at 17:34

## 2024-10-04 RX ADMIN — TRAZODONE HYDROCHLORIDE 50 MG: 50 TABLET ORAL at 00:33

## 2024-10-04 NOTE — PLAN OF CARE
"Goal Outcome Evaluation:  Plan of Care Reviewed With: patient  Patient Agreement with Plan of Care: agrees     Progress: no change  Outcome Evaluation: Patient came to the ED for suicidal thoughts. Patient states he has been having suicidal thoughts all day caused by his anxiety and depression. Patient reports he had self harming behaviors in the ED and scratched his left wrist with his finger nail. Patient reports that he has been living at [facility] and he is unable to go back there because he had wrapped a blanket around his neck. Patient reports he is homeless and has no family that will help him because they are tired of his anger issues. Patient reports he has been having auditory hallucinations \"can't make out what they are saying.\" Patient rates anxiety and depression 10/10. Denies HI. Denies drug or alcohol abuse. Appetite-good. Sleep-poor. Patient also reports that he is scheduled to get his gallbladder removed on October 23 rd and wants to know if he can just stay here until his surgery.                               "

## 2024-10-04 NOTE — PROGRESS NOTES
[id number]    Therapist contacted [facility] [phone]; reviewed patient's insurance plan and set patient up with phone to do interview this date. Patient was accepted for early next week sometime. Patient prefers local referral such as this because he reports hat he must stay in the area to do his gallbladder surgery on 10/23.

## 2024-10-04 NOTE — DISCHARGE INSTR - APPOINTMENTS
[facility]   [address] [place] [id number] [phone]     Admission: upon discharge.

## 2024-10-04 NOTE — NURSING NOTE
Pt bs 400 and repeat was 431 dr wheeler made aware and made aware pt received 14 units humalog, 35 units lantus, and metformin 500mg. Recheck blood sugar in 1 hour

## 2024-10-04 NOTE — NURSING NOTE
Spoke to Dr. Miramontes via phone. Intake information, labs, and vital sign provided. Instructed to hold patient for face to face consult in the morning. [id number]

## 2024-10-04 NOTE — NURSING NOTE
PT BLOOD SUGAR 424 14UNITS HUMALOG GIVEN PER [facility]SI AND DR MUNIZ AWARE. NO NEW ORDERS NOTED. WILL RECHECK SUGAR IN 1 HOUR

## 2024-10-04 NOTE — PLAN OF CARE
Problem: Adult Behavioral Health Plan of Care  Goal: Plan of Care Review  Outcome: Progressing  Flowsheets (Taken 10/4/2024 1137)  Consent Given to Review Plan with: Grandmother Bella  Progress: no change  Plan of Care Reviewed With:   patient   grandparent  Patient Agreement with Plan of Care: agrees  Outcome Evaluation: New admit. Completed social history and integrated summary  Goal: Patient-Specific Goal (Individualization)  Outcome: Progressing  Flowsheets  Taken 10/4/2024 1137 by Ama Verdugo  Patient-Specific Goals (Include Timeframe): Patient will deny SI/HI in 1-4 days. Patient will identify 1-3 healthy coping skills for mood lability in 1-4 days. Patient will demonstrate positive peer interaction and healthy boundaries in 1-7 days.  Patient will consent to appropriate aftercare planning with sober living or homeless shelter referral in 1-7 days.  Individualized Care Needs: Therapist will offer 1-4 individual sessions focusing on CBT/DBT healthy coping skills for Bipolar II.  Therapist will offer family education and safety planning. Therapist will offer aftercare planning with sober living or shelter.  Taken 10/4/2024 1049 by Ama Verdugo  Patient Personal Strengths:   expressive of needs   expressive of emotions   motivated for treatment   resilient   resourceful   spiritual/Sabianist support  Patient Vulnerabilities:   adverse childhood experience(s)   family/relationship conflict   history of unsuccessful treatment   housing insecurity   lacks insight into illness   legal concerns   limited support system   occupational insecurity   poor impulse control   substance abuse/addiction  Taken 10/3/2024 4336 by Isaura Silver RN  Anxieties, Fears or Concerns: none voiced  Goal: Optimized Coping Skills in Response to Life Stressors  Outcome: Progressing  Intervention: Promote Effective Coping Strategies  Flowsheets (Taken 10/4/2024 1137)  Supportive Measures:   active  listening utilized   counseling provided  Goal: Develops/Participates in [facility] [place] to Support Successful Transition  Outcome: Progressing  Intervention: Foster [facility] [place]  Flowsheets (Taken 10/4/2024 1137)  Trust Relationship/Rapport:   care explained   thoughts/feelings acknowledged   choices provided   emotional support provided   empathic listening provided   questions answered   questions encouraged   reassurance provided  Intervention: Mutually Develop Transition Plan  Flowsheets  Taken 10/4/2024 1133  Outpatient/Agency/Support Group Needs: (sober living or homeless shelter.)   residential services   other (see comments)  Transition Support:   community resources reviewed   crisis management plan promoted   crisis management plan verbalized   follow-up care coordinated   follow-up care discussed  Anticipated Discharge Disposition: (sober living or homeless shelter referral)   homeless   homeless shelter   residential substance use unit   other (see comments)  Taken 10/4/2024 1135  Discharge Coordination/Progress: Patient has [employer] [facility] [place] and signed consent to attempt [facility] and [facility] referrals.  Concerns Comments: NA  Transportation Anticipated: agency  Transportation Concerns: no car  Current Discharge Risk:   psychiatric illness   substance use/abuse  Concerns to be Addressed:   substance/tobacco abuse/use   compliance issue   coping/stress   discharge planning   developmental   mental health   employment/school   relationship   homelessness   medication   suicidal   financial/insurance  Readmission Within the Last 30 Days: current reason for admission unrelated to previous admission  Patient/Family Anticipated Services at Transition: rehabilitation services  Patient's Choice of Community Agency(s): [facility] or [facility]  Patient/Family Anticipates Transition to: inpatient rehabilitation facility  Offered/Gave Vendor List: no   Goal Outcome  Evaluation:  Plan of Care Reviewed With: patient, grandparent  Patient Agreement with Plan of Care: agrees  Consent Given to Review Plan with: Grandmother Bella  Progress: no change  Outcome Evaluation: New admit. Completed social history and integrated summary

## 2024-10-04 NOTE — PROGRESS NOTES
[facility]    COMPLETE AND FAX -[phone]    INPATIENT AUTHORIZATION FORM     IMPATIENT SERVICE CODE (SEE THE LIST AT THE BOTTOM OF THE FORM) 529    STANDARD REQUESTS - DETERMINATION WITHIN 2 BUSINESS DAYS OF RECEIVING ALL NECESSARY INFORMATION.    URGENT REQUESTS - I CERTIFY THIS REQUEST IS URGENT AND MEDICALLY NECESSARY TO TREAT AN INJURY, ILLNESS OR CONDIT (NOT LIFE THREATENING) WITHIN 2 BUSINESS DAYS TO AVOID COMPLICATIONS AND UNNECESSARY SUFFERING OR SEVER PAIN.  URGENT REQUESTS MUST BE SIGNED BY THE PHYSICIAN TO RECEIVE PRIORITY.         MEDICAID/MEMBER ID  [id number]    LAST NAME. FIRST NAME CHRISTIAN RUBIO    REQUESTING PROVIDER INFORMATION     REQUESTING NPI # [id number]       REQUESTING TIN    [id number]         REQUESTING PROVIDER CONTACT NAME (UR)  IVIS JIMENEZ RN UR    REQUESTING PROVIDER NAME  ASHLEY GARZA           REQUESTING PROVIDER PHONE # [phone]       REQUESTING PROVIDER FAX # [phone]    SERVICING PROVIDER / FACILITY INFORMATION     SERVICING NPI [id number]  SERVICING TIN [id number]     SERVICING PROVIDER CONTACT NAME IVIS JIMENEZ   SERVICING PROVIDER/FACILITY NAME   [facility]       PHONE # [phone]    FAX # [phone]    AUTHORIZATION REQUEST     PRIMARY PROCEDURE CODE    ADDITIONAL PROCEDURE CODE       START DATE OR ADMISSION DATE: 10/03/24       DIAGNOSIS CODE: F33      ADDITIONALS PROCEDURE CODE:  F31.81     DISCHARGE DATE (IF APPLICABLE) OTHERWISE LENGTH OF STAY WILL BE BASED ON MEDICAL NECESSITY   ADDITIONAL DIAGNOSIS CODE       BEHAVIORAL HEALTH SERVICE TYPES  528  CHEMICAL SUBSTANCE ABUSE   529  PSYCHIATRIC ADMISSION   531  EATING DISORDERS  532 CRISIS STABILIZATION UNIT  535 RESIDENTIAL TREATMENT  - SUBSTANCE USE   536 RESIDENTIAL TREATMENT - MENTAL HEALTH             Ashley Thomas MD   Physician  Psychiatry     H&P      Signed     Date of Service: 10/04/24 0853  Creation Time: 10/04/24 0853     Signed       Expand All Collapse  "All            INITIAL PSYCHIATRIC HISTORY & PHYSICAL     Patient Identification:  Name:  Ajay Xie  Age:  19 y.o.  Sex:  male  :  2005  MRN:  [id number]   Visit Number:  [id number]  Primary Care Physician:  Provider, No Known     SUBJECTIVE     CC/Focus of Exam: SI, depression     HPI: Ajay Xie is a 19 y.o. male who was admitted on 10/3/2024 with complaints of worsening depression and SI. He at first denied any reason for feeling suicidal. He was here from 24 to 24 and went back to rehab. He admits that he has been in trouble for not following rules. He states he was explained the rules at that place and he understood and knows that he broke the rules and then he doesn't like the consequences and becomes suicidal. He states he is still feeling a little suicidal.      PAST PSYCHIATRIC HX: The patient reports he has a history of bipolar disorder. He states he has had it since he was little. When asked about the symptoms, he reports he finds it hard to understand stuff, he is always breaking rules, and getting into trouble. He reports episodes of elevated moods, need for little sleep, racing thoughts. He also reports episodes of severe depression lasting at times for more than two weeks, he feels down, withdrawn, poor sleep and appetite, has suicidal thoughts.      SUBSTANCE USE HX: The patient reports a history of substance use. First use was about a year ago when he started drinking moonshine. Over time the use increased and the patient  continued to use despite negative consequences including relationship problems, social and financial problems. The patient endorses symptoms of tolerance and withdrawals and ongoing cravings to use. Has tried to cut down and stop but has not been successful. Spends too much time and resources in pursuit of substance use. Longest period of sobriety is reported to be 2 weeks.   He  reports he had been drinking a \"lot\" of moonshine everyday.   Last use " was about little over two weeks ago.   Withdrawal symptoms are not reported today.         SOCIAL HX:   Social History   Social History           Socioeconomic History    Marital status: Single    Years of education: 12    Highest education level: High school graduate   Tobacco Use    Smoking status: Never    Smokeless tobacco: Never   Vaping Use    Vaping status: Former    Substances: Nicotine    Devices: Disposable   Substance and Sexual Activity    Alcohol use: Not Currently    Drug use: Never    Sexual activity: Not Currently               Medical History        Past Medical History:   Diagnosis Date    Anxiety      Bipolar disorder      Depression      Diabetes      Gallstones      HTN (hypertension)      Self-injurious behavior      Suicidal behavior                 Surgical History         Past Surgical History:   Procedure Laterality Date    EAR TUBES                History reviewed. No pertinent family history.        Prescriptions Prior to Admission           Medications Prior to Admission   Medication Sig Dispense Refill Last Dose    famotidine (PEPCID) 20 MG tablet Take 1 tablet by mouth 2 (Two) Times a Day As Needed for Heartburn.          hydrOXYzine (ATARAX) 25 MG tablet Take 1 tablet by mouth Every 4 (Four) Hours As Needed for Itching or Anxiety. Indications: Feeling Anxious          Insulin Glargine (Lantus SoloStar) 100 UNIT/ML injection pen Inject 35 Units under the skin into the appropriate area as directed 2 (Two) Times a Day. Indications: Type 2 Diabetes          Insulin Lispro, 1 Unit Dial, (HumaLOG KwikPen) 100 UNIT/ML solution pen-injector Inject 0-50 Units under the skin into the appropriate area as directed Daily.          lamoTRIgine (LaMICtal) 25 MG tablet Take 1 tablet by mouth Daily for 14 days, THEN 2 tablets Daily for 14 days. Indications: Manic-Depression 42 tablet 0      Lurasidone HCl (LATUDA) 20 MG tablet tablet Take 1 tablet by mouth Daily With Dinner. Indications: MIXED BIPOLAR  AFFECTIVE DISORDER 30 tablet 0      metFORMIN (GLUCOPHAGE) 500 MG tablet Take 1 tablet by mouth 2 (Two) Times a Day With Meals. Indications: Type 2 Diabetes 60 tablet 0      traZODone (DESYREL) 50 MG tablet Take 1 tablet by mouth Every Night. Indications: Trouble Sleeping 30 tablet 0                 ALLERGIES:  Patient has no known allergies.     Temp:  [97.4 °F (36.3 °C)-98.3 °F (36.8 °C)] 98.1 °F (36.7 °C)  Heart Rate:  [] 86  Resp:  [16-18] 18  BP: (104-148)/(64-87) 148/87     REVIEW OF SYSTEMS:  Review of Systems   Constitutional: Negative.    HENT: Negative.     Eyes: Negative.    Respiratory: Negative.     Cardiovascular: Negative.    Gastrointestinal: Negative.    Endocrine: Negative.    Genitourinary: Negative.    Musculoskeletal: Negative.    Skin: Negative.    Allergic/Immunologic: Negative.    Neurological: Negative.    Hematological: Negative.    Psychiatric/Behavioral:  Positive for dysphoric mood and suicidal ideas. The patient is nervous/anxious.          OBJECTIVE    PHYSICAL EXAM:  Physical Exam  Constitutional:  Appears well-developed and well-nourished.   HENT:   Head: Normocephalic and atraumatic.   Right Ear: External ear normal.   Left Ear: External ear normal.   Mouth/Throat: Oropharynx is clear and moist.   Eyes: Pupils are equal, round, and reactive to light. Conjunctivae and EOM are normal.   Neck: Normal range of motion. Neck supple.   Cardiovascular: Normal rate, regular rhythm and normal heart sounds.    Respiratory: Effort normal and breath sounds normal. No respiratory distress. No wheezes.   GI: Soft. Bowel sounds are normal.No distension. There is no tenderness.   Musculoskeletal: Normal range of motion. No edema or deformity.   Neurological:  Cranial Nerves: I. No anosmia. II: No visual disturbance. III, IV VI: EOMI, PERRLA. V: Corneal reflext intact, no abnormal sensations. VII: No facial palsy, or altered sensation. VIII: Hearing intact, balance intact. IX: Intact ah reflex.  X: Normal phonation, swallowing. XI: Normal shrug and head movement. XII: Intact tongue movements  Coordination normal. No lateralizing signs.  Skin: Skin is warm and dry. No rash noted. No erythema.      MENTAL STATUS EXAM:   Hygiene:   fair  Cooperation:  Cooperative  Eye Contact:  Fair  Psychomotor Behavior:  Appropriate  Affect:  Restricted  Hopelessness: 5  Speech:  Normal  Thought Progress: Goal directed  Thought Content:  Normal  Suicidal:  Suicidal Ideation  Homicidal:  None  Hallucinations:  None  Delusion:  None  Memory:  Intact  Orientation:  Person, Place, Time, and Situation  Reliability:  fair  Insight:  Fair  Judgement:  Poor  Impulse Control:  Poor     Imaging Results (Last 24 Hours)         ** No results found for the last 24 hours. **                ECG/EMG Results (most recent)         None                      Lab Results   Component Value Date     GLUCOSE 378 (H) 10/03/2024     BUN 16 10/03/2024     CREATININE 0.68 (L) 10/03/2024     BCR 23.5 10/03/2024     CO2 19.3 (L) 10/03/2024     CALCIUM 9.4 10/03/2024     ALBUMIN 3.9 10/03/2024     AST 36 10/03/2024     ALT 7 10/03/2024               Lab Results   Component Value Date     WBC 4.21 10/03/2024     HGB 14.4 10/03/2024     HCT 38.8 10/03/2024     MCV 92.4 10/03/2024      (L) 10/03/2024         Last Urine Toxicity  More data exists              Latest Ref Rng & Units 10/3/2024 9/25/2024   LAST URINE TOXICITY RESULTS   Amphetamine, Urine Qual Negative Negative  Negative  Negative    Barbiturates Screen, Urine Negative Negative  Negative  Negative    Benzodiazepine Screen, Urine Negative Negative  Negative  Negative    Buprenorphine, Screen, Urine Negative Negative  Negative  Negative    Cocaine Screen, Urine Negative Negative  Negative  Negative    Fentanyl, Urine Negative Negative  Negative  Negative    Methadone Screen , Urine Negative Negative  Negative  Negative    Methamphetamine, Ur Negative Negative  Negative  Negative          Details            Multiple values from one day are sorted in reverse-chronological order                      Brief Urine Lab Results  (Last result in the past 365 days)          Color   Clarity   Blood   Leuk Est   Nitrite   Protein   CREAT   Urine HCG         10/03/24 1829 Yellow    Clear    Negative    Negative    Negative    Negative                             DATA  Labs reviewed. Glucose 313. Alk phos 133. Platelets 133. UA  glucose >= 1000 mg/dL. UDS negative.   EKG reviewed. QTc 440 ms. BERRIOS reviewed.   Record reviewed. The patient has been admitted here twice for a similar presentation. Last admission was from 9/25/24 to 9/30/24.      Strengths: Minimal     Weaknesses:Substance use, Poor coping skills, and Personality issues     Code status:  Full  Discussed code status with patient.     ASSESSMENT & PLAN:     Hospital bed: No       Suicidal ideations  -SP 3  -Patient appears to be using suicidal threats to get out of consequences of his negative behaviors.        Bipolar II disorder  -Continue lurasidone and lamotrigine       Alcohol use disorder, severe, dependence  -Encourage sobriety       DM (diabetes mellitus), type 2  -Metformin  -Lantus  -Sliding scale coverage        The patient has been admitted for safety and stabilization.  Patient will be monitored for suicidality daily and maintained on Special Precautions Level 3 (q15 min checks) .  The patient will have individual and group therapy with a master's level therapist. A master treatment plan will be developed and agreed upon by the patient and his/her treatment team.  The patient's estimated length of stay in the hospital is 5-7 days.

## 2024-10-04 NOTE — NURSING NOTE
Pt just exited restroom holding out his Lt wrist which is bleeding. Pt states he cut his wrist with his fingernail. Dr. Miramontes notified of pt's actions. Orders received to admit pt. SP3 routine orders, sliding scale glucose management RBTOx2. Patient and ED provider made aware of plan of care. Safety precautions maintained. EKG completed  per current admission protocol.

## 2024-10-04 NOTE — PROGRESS NOTES
1042    DATA:      Therapist met individually with patient this date to introduce role and to discuss hospitalization expectations. Patient agreeable. Reviewed medical record and staffed case with treatment team this date. No major issues identified.       Clinical Maneuvering/Intervention:     Therapist assisted patient in processing above session content; acknowledged and normalized patient’s thoughts, feelings, and concerns.  Discussed the therapist/patient relationship and explain the parameters and limitations of relative confidentiality.  Also discussed the importance of active participation, and honesty to the treatment process.  Encouraged the patient to discuss/vent their feelings, frustrations, and fears concerning their ongoing medical issues and validated their feelings.     Allowed patient to freely discuss issues without interruption or judgment. Provided safe, confidential environment to facilitate the development of positive therapeutic relationship and encourage open, honest communication.      Concern was voiced regarding substance use and educated patient on risks associated with use. Patient was advised to abstain from use and educated on community resources that can help with sobriety and recovery.  Patient is declining to return to [facility] and signed consent for [facility] and [facility].     Therapist addressed discharge safety planning this date. Assisted patient in identifying risk factors which would indicate the need for higher level of care after discharge;  including thoughts to harm self or others and/or self-harming behavior. Encouraged patient to call [phone],  911, or present to the nearest emergency room should any of these events occur. Discussed crisis intervention services and means to access.  Encouraged securing any objects of harm.       Therapist completed integrated summary, treatment plan, and initiated social history this date.  Therapist is strongly encouraging  "family involvement in treatment.       ASSESSMENT:      The patient is a 19 year old  male. Patient is single, unemployed.  He is 12th grade educated-Special educated.  Patient is currently homeless.  He was last residing at [facility].  Patient has pending court date in [place] on 10-27-24 for DVO.       Per report, \"Intake assessment completed at this time. Pt presents to Intake stating \"I was here for a blood sugar and then I started randomly having suicidal thoughts with a plan to use anything. [facility] kicked me out today and I have nowhere else to go\" Pt initially denied SI where brought to the ED but then approached the ED nurses station and stated that he was randomly suicidal again with a plan to use anything to hurt himself and that he wanted to go to the [facility]. Pt also reports auditory hallucinations of mumbled voices earlier today. Pt has been admitted here twice in the last couple weeks for suicidal ideations and has a long hx of psych treatment. Pt is type 2 diabetic and is on sliding scale at home. [id number]  [id number] on scale of 0-10. Sleep and appetite good.\"     Today, patient was seen 1-1 in the office.  Patient calm, cooperative, oriented x 4.  Patient observed to have appropriate affect, congruent mood, normal thought content, linear thought processes. Patient currently denies SI/HI/AVH.  Patient rates depression/anxiety at 10/10.  Patient reports poor sleep, good appetite.  Patient reports that he is here today due to conflict and trouble following the rules at [facility]. He reports that he gets in trouble for playing video games too loudly and then wants to scratch himself with his finger nails. He reports that he needs the hospital to find him housing. Patient admits that he does not have income. He was educated about resources such as shelter, sober living, and residential treatment.  He reports that he does not want to return to [facility] due to all " the rules there.  He asked to be sent to sober living near by so that he can keep his scheduled gallbladder surgery on 10/23.     Patient signed consent for his grandmother Bella at [phone]; No answer this date.     Patient signed consent and Therapist assisted  navigator with aftercare referrals:     [facility] [phone]; no male beds open   [facility] [phone]; No answer x 3  [facility] - [phone];  Patient can not return. He will not follow diabetic diet despite education.  He also reports SI all the time there.  Staff report that patient can not return there because they do not have adequate medical staff to help patient.     It appears that patient continues to struggle with following rules leading to several recent issues: being discharged from both [facility], [facility] and also leading to legal trouble including a court hearing for DVO in October.  Patient possibly impacted by intellectual disability and personality traits at this time.  He asked to call a girlfriend he had met on [employer]. She lives in another state, but he has visited her on the [employer] bus.  He has also had issues with poor boundaries here leading to getting numbers from female peers and calling them after discharge.  He was educated about the rules and the importance of adhering to appropriate boundaries.  Patient has been at least 1 month sober according to self report. He may have difficulty finding alternative residential PEPE. He is aware that he may have to seek sober living or shelter referrals.  Therapist has been told in the past that he can not stay with his grandmother and there is a standing DVO from his father in place.     PLAN:       Patient to remain hospitalized this date.      Treatment team will focus efforts on stabilizing patient's acute symptoms while providing education on healthy coping and crisis management to reduce hospitalizations.   Patient requires daily  psychiatrist evaluation and 24/7 nursing supervision to promote patient  safety.     Therapist will offer 1-4 individual sessions, family education, and appropriate referral.     Therapist recommends continued evaluation. Patient signed consent for [facility] and [facility].  [facility] is currently full. No answer with [facility].

## 2024-10-04 NOTE — PLAN OF CARE
Goal Outcome Evaluation:  Plan of Care Reviewed With: patient  Patient Agreement with Plan of Care: agrees     Progress: improving  Outcome Evaluation: Patient rates both anxiety and depression 10/10. Pt denies any SI/HI/AVH. Pt reports good sleep and appetite. Pt had no complaints this shift.

## 2024-10-04 NOTE — ED NOTES
"Patient came to the nurses station stating, \"Can I go trillium now? I'm now suicidal?\". Provider made aware.   Patient states that he just now developed these thoughts while being here in the ED. He states that he does have a plan and the plan is that he wants to cut himself.   "

## 2024-10-04 NOTE — NURSING NOTE
" Intake assessment completed at this time. Pt presents to Intake stating \"I was here for a blood sugar and then I started randomly having suicidal thoughts with a plan to use anything. [facility] kicked me out today and I have nowhere else to go\" Pt initially denied SI where brought to the ED but then approached the ED nurses station and stated that he was randomly suicidal again with a plan to use anything to hurt himself and that he wanted to go to the [facility]. Pt also reports auditory hallucinations of mumbled voices earlier today. Pt has been admitted here twice in the last couple weeks for suicidal ideations and has a long hx of psych treatment. Pt is type 2 diabetic and is on sliding scale at home.     Labs: , 3+ GLU in UA  Creatinine 0.68,     [id number]  [id number] on scale of 0-10. Sleep and appetite good.    Pt denies any HI     Pt A&Ox4  "

## 2024-10-04 NOTE — H&P
"      INITIAL PSYCHIATRIC HISTORY & PHYSICAL    Patient Identification:  Name:  Ajay Xie  Age:  19 y.o.  Sex:  male  :  2005  MRN:  [id number]   Visit Number:  [id number]  Primary Care Physician:  Provider, No Known    SUBJECTIVE    CC/Focus of Exam: SI, depression    HPI: Ajay Xie is a 19 y.o. male who was admitted on 10/3/2024 with complaints of worsening depression and SI. He at first denied any reason for feeling suicidal. He was here from 24 to 24 and went back to rehab. He admits that he has been in trouble for not following rules. He states he was explained the rules at that place and he understood and knows that he broke the rules and then he doesn't like the consequences and becomes suicidal. He states he is still feeling a little suicidal.     PAST PSYCHIATRIC HX: The patient reports he has a history of bipolar disorder. He states he has had it since he was little. When asked about the symptoms, he reports he finds it hard to understand stuff, he is always breaking rules, and getting into trouble. He reports episodes of elevated moods, need for little sleep, racing thoughts. He also reports episodes of severe depression lasting at times for more than two weeks, he feels down, withdrawn, poor sleep and appetite, has suicidal thoughts.     SUBSTANCE USE HX: The patient reports a history of substance use. First use was about a year ago when he started drinking moonshine. Over time the use increased and the patient  continued to use despite negative consequences including relationship problems, social and financial problems. The patient endorses symptoms of tolerance and withdrawals and ongoing cravings to use. Has tried to cut down and stop but has not been successful. Spends too much time and resources in pursuit of substance use. Longest period of sobriety is reported to be 2 weeks.   He  reports he had been drinking a \"lot\" of moonshine everyday.   Last use was about little " over two weeks ago.   Withdrawal symptoms are not reported today.       SOCIAL HX:   Social History     Socioeconomic History    Marital status: Single    Years of education: 12    Highest education level: High school graduate   Tobacco Use    Smoking status: Never    Smokeless tobacco: Never   Vaping Use    Vaping status: Former    Substances: Nicotine    Devices: Disposable   Substance and Sexual Activity    Alcohol use: Not Currently    Drug use: Never    Sexual activity: Not Currently         Past Medical History:   Diagnosis Date    Anxiety     Bipolar disorder     Depression     Diabetes     Gallstones     HTN (hypertension)     Self-injurious behavior     Suicidal behavior           Past Surgical History:   Procedure Laterality Date    EAR TUBES         History reviewed. No pertinent family history.      Medications Prior to Admission   Medication Sig Dispense Refill Last Dose    famotidine (PEPCID) 20 MG tablet Take 1 tablet by mouth 2 (Two) Times a Day As Needed for Heartburn.       hydrOXYzine (ATARAX) 25 MG tablet Take 1 tablet by mouth Every 4 (Four) Hours As Needed for Itching or Anxiety. Indications: Feeling Anxious       Insulin Glargine (Lantus SoloStar) 100 UNIT/ML injection pen Inject 35 Units under the skin into the appropriate area as directed 2 (Two) Times a Day. Indications: Type 2 Diabetes       Insulin Lispro, 1 Unit Dial, (HumaLOG KwikPen) 100 UNIT/ML solution pen-injector Inject 0-50 Units under the skin into the appropriate area as directed Daily.       lamoTRIgine (LaMICtal) 25 MG tablet Take 1 tablet by mouth Daily for 14 days, THEN 2 tablets Daily for 14 days. Indications: Manic-Depression 42 tablet 0     Lurasidone HCl (LATUDA) 20 MG tablet tablet Take 1 tablet by mouth Daily With Dinner. Indications: MIXED BIPOLAR AFFECTIVE DISORDER 30 tablet 0     metFORMIN (GLUCOPHAGE) 500 MG tablet Take 1 tablet by mouth 2 (Two) Times a Day With Meals. Indications: Type 2 Diabetes 60 tablet 0      traZODone (DESYREL) 50 MG tablet Take 1 tablet by mouth Every Night. Indications: Trouble Sleeping 30 tablet 0          ALLERGIES:  Patient has no known allergies.    Temp:  [97.4 °F (36.3 °C)-98.3 °F (36.8 °C)] 98.1 °F (36.7 °C)  Heart Rate:  [] 86  Resp:  [16-18] 18  BP: (104-148)/(64-87) 148/87    REVIEW OF SYSTEMS:  Review of Systems   Constitutional: Negative.    HENT: Negative.     Eyes: Negative.    Respiratory: Negative.     Cardiovascular: Negative.    Gastrointestinal: Negative.    Endocrine: Negative.    Genitourinary: Negative.    Musculoskeletal: Negative.    Skin: Negative.    Allergic/Immunologic: Negative.    Neurological: Negative.    Hematological: Negative.    Psychiatric/Behavioral:  Positive for dysphoric mood and suicidal ideas. The patient is nervous/anxious.         OBJECTIVE    PHYSICAL EXAM:  Physical Exam  Constitutional:  Appears well-developed and well-nourished.   HENT:   Head: Normocephalic and atraumatic.   Right Ear: External ear normal.   Left Ear: External ear normal.   Mouth/Throat: Oropharynx is clear and moist.   Eyes: Pupils are equal, round, and reactive to light. Conjunctivae and EOM are normal.   Neck: Normal range of motion. Neck supple.   Cardiovascular: Normal rate, regular rhythm and normal heart sounds.    Respiratory: Effort normal and breath sounds normal. No respiratory distress. No wheezes.   GI: Soft. Bowel sounds are normal.No distension. There is no tenderness.   Musculoskeletal: Normal range of motion. No edema or deformity.   Neurological:  Cranial Nerves: I. No anosmia. II: No visual disturbance. III, IV VI: EOMI, PERRLA. V: Corneal reflext intact, no abnormal sensations. VII: No facial palsy, or altered sensation. VIII: Hearing intact, balance intact. IX: Intact ah reflex. X: Normal phonation, swallowing. XI: Normal shrug and head movement. XII: Intact tongue movements  Coordination normal. No lateralizing signs.  Skin: Skin is warm and dry. No rash  noted. No erythema.     MENTAL STATUS EXAM:   Hygiene:   fair  Cooperation:  Cooperative  Eye Contact:  Fair  Psychomotor Behavior:  Appropriate  Affect:  Restricted  Hopelessness: 5  Speech:  Normal  Thought Progress: Goal directed  Thought Content:  Normal  Suicidal:  Suicidal Ideation  Homicidal:  None  Hallucinations:  None  Delusion:  None  Memory:  Intact  Orientation:  Person, Place, Time, and Situation  Reliability:  fair  Insight:  Fair  Judgement:  Poor  Impulse Control:  Poor    Imaging Results (Last 24 Hours)       ** No results found for the last 24 hours. **             ECG/EMG Results (most recent)       None             Lab Results   Component Value Date    GLUCOSE 378 (H) 10/03/2024    BUN 16 10/03/2024    CREATININE 0.68 (L) 10/03/2024    BCR 23.5 10/03/2024    CO2 19.3 (L) 10/03/2024    CALCIUM 9.4 10/03/2024    ALBUMIN 3.9 10/03/2024    AST 36 10/03/2024    ALT 7 10/03/2024       Lab Results   Component Value Date    WBC 4.21 10/03/2024    HGB 14.4 10/03/2024    HCT 38.8 10/03/2024    MCV 92.4 10/03/2024     (L) 10/03/2024       Last Urine Toxicity  More data exists         Latest Ref Rng & Units 10/3/2024 9/25/2024   LAST URINE TOXICITY RESULTS   Amphetamine, Urine Qual Negative Negative  Negative  Negative    Barbiturates Screen, Urine Negative Negative  Negative  Negative    Benzodiazepine Screen, Urine Negative Negative  Negative  Negative    Buprenorphine, Screen, Urine Negative Negative  Negative  Negative    Cocaine Screen, Urine Negative Negative  Negative  Negative    Fentanyl, Urine Negative Negative  Negative  Negative    Methadone Screen , Urine Negative Negative  Negative  Negative    Methamphetamine, Ur Negative Negative  Negative  Negative       Details          Multiple values from one day are sorted in reverse-chronological order               Brief Urine Lab Results  (Last result in the past 365 days)        Color   Clarity   Blood   Leuk Est   Nitrite   Protein   CREAT    Urine HCG        10/03/24 1829 Yellow   Clear   Negative   Negative   Negative   Negative                   DATA  Labs reviewed. Glucose 313. Alk phos 133. Platelets 133. UA  glucose >= 1000 mg/dL. UDS negative.   EKG reviewed. QTc 440 ms. BERRIOS reviewed.   Record reviewed. The patient has been admitted here twice for a similar presentation. Last admission was from 9/25/24 to 9/30/24.     Strengths: Minimal    Weaknesses:Substance use, Poor coping skills, and Personality issues    Code status:  Full  Discussed code status with patient.    ASSESSMENT & PLAN:    Hospital bed: No      Suicidal ideations  -SP 3  -Patient appears to be using suicidal threats to get out of consequences of his negative behaviors.       Bipolar II disorder  -Continue lurasidone and lamotrigine      Alcohol use disorder, severe, dependence  -Encourage sobriety      DM (diabetes mellitus), type 2  -Metformin  -Lantus  -Sliding scale coverage      The patient has been admitted for safety and stabilization.  Patient will be monitored for suicidality daily and maintained on Special Precautions Level 3 (q15 min checks) .  The patient will have individual and group therapy with a master's level therapist. A master treatment plan will be developed and agreed upon by the patient and his/her treatment team.  The patient's estimated length of stay in the hospital is 5-7 days.

## 2024-10-05 LAB
GLUCOSE BLDC GLUCOMTR-MCNC: 261 MG/DL (ref 70–130)
GLUCOSE BLDC GLUCOMTR-MCNC: 284 MG/DL (ref 70–130)
GLUCOSE BLDC GLUCOMTR-MCNC: 331 MG/DL (ref 70–130)
GLUCOSE BLDC GLUCOMTR-MCNC: 472 MG/DL (ref 70–130)
GLUCOSE BLDC GLUCOMTR-MCNC: 505 MG/DL (ref 70–130)

## 2024-10-05 PROCEDURE — 82948 REAGENT STRIP/BLOOD GLUCOSE: CPT

## 2024-10-05 PROCEDURE — 63710000001 INSULIN LISPRO (HUMAN) PER 5 UNITS: Performed by: STUDENT IN AN ORGANIZED HEALTH CARE EDUCATION/TRAINING PROGRAM

## 2024-10-05 PROCEDURE — 63710000001 INSULIN GLARGINE PER 5 UNITS: Performed by: PSYCHIATRY & NEUROLOGY

## 2024-10-05 PROCEDURE — 99232 SBSQ HOSP IP/OBS MODERATE 35: CPT | Performed by: PSYCHIATRY & NEUROLOGY

## 2024-10-05 RX ORDER — INSULIN LISPRO 100 [IU]/ML
25 INJECTION, SOLUTION INTRAVENOUS; SUBCUTANEOUS ONCE
Status: COMPLETED | OUTPATIENT
Start: 2024-10-05 | End: 2024-10-05

## 2024-10-05 RX ADMIN — HYDROXYZINE HYDROCHLORIDE 50 MG: 50 TABLET, FILM COATED ORAL at 07:41

## 2024-10-05 RX ADMIN — INSULIN LISPRO 25 UNITS: 100 INJECTION, SOLUTION INTRAVENOUS; SUBCUTANEOUS at 06:37

## 2024-10-05 RX ADMIN — METFORMIN HYDROCHLORIDE 500 MG: 500 TABLET ORAL at 17:22

## 2024-10-05 RX ADMIN — INSULIN LISPRO 10 UNITS: 100 INJECTION, SOLUTION INTRAVENOUS; SUBCUTANEOUS at 11:21

## 2024-10-05 RX ADMIN — INSULIN GLARGINE 35 UNITS: 100 INJECTION, SOLUTION SUBCUTANEOUS at 10:03

## 2024-10-05 RX ADMIN — TRAZODONE HYDROCHLORIDE 50 MG: 50 TABLET ORAL at 21:18

## 2024-10-05 RX ADMIN — INSULIN LISPRO 8 UNITS: 100 INJECTION, SOLUTION INTRAVENOUS; SUBCUTANEOUS at 16:37

## 2024-10-05 RX ADMIN — INSULIN LISPRO 8 UNITS: 100 INJECTION, SOLUTION INTRAVENOUS; SUBCUTANEOUS at 21:18

## 2024-10-05 RX ADMIN — LURASIDONE HYDROCHLORIDE 20 MG: 40 TABLET, FILM COATED ORAL at 17:22

## 2024-10-05 RX ADMIN — LAMOTRIGINE 25 MG: 100 TABLET ORAL at 08:53

## 2024-10-05 RX ADMIN — INSULIN GLARGINE 35 UNITS: 100 INJECTION, SOLUTION SUBCUTANEOUS at 21:18

## 2024-10-05 RX ADMIN — METFORMIN HYDROCHLORIDE 500 MG: 500 TABLET ORAL at 08:53

## 2024-10-05 RX ADMIN — INSULIN LISPRO 14 UNITS: 100 INJECTION, SOLUTION INTRAVENOUS; SUBCUTANEOUS at 07:41

## 2024-10-05 NOTE — NURSING NOTE
This RN spoke to Dr Miramontes due to pt attempting to cut self with comb while in room. New order to place pt in scrubs and pt to be on dayroom restrictions. RBO x 2. Von RN made aware of order. This RN in room and explaining to pt that he will be placed in scrubs and will not be allowed out of dayroom for anything other than to use the restroom. Pt voiced understanding. No questions, comments or concerns for this RN or MD.

## 2024-10-05 NOTE — PROGRESS NOTES
"INPATIENT PSYCHIATRIC PROGRESS NOTE    Name:  Ajay Xie  :  2005  MRN:  [id number]  Visit Number:  [id number]  Length of stay:  2    SUBJECTIVE    CC/Focus of Exam: SI, bipolar disorder    INTERVAL HISTORY:  The patient got upset last evening when he didn't get to select the TV channel as the majority of his peers wanted to watch a certain channel. He then went to his room and tried to scratch himself in a suicide attempt. He admits that he was not happy that he didn't get his way and tried to use a comb to cut himself. He has been very inappropriate with staff and refused to follow the unit rules.     He has been put on SP1. He also refuses to adhere to his dietary restrictions and tries to take food from his peers. He continues to act in manipulative and at times dangerous manners and is disruptive to the milieu and will need to be maintained on 1:1 monitoring.     Review of Systems   Constitutional: Negative.    Respiratory: Negative.     Cardiovascular: Negative.    Gastrointestinal: Negative.        OBJECTIVE    Temp:  [97.1 °F (36.2 °C)-98.6 °F (37 °C)] 97.1 °F (36.2 °C)  Heart Rate:  [71-78] 78  Resp:  [16-18] 18  BP: (121-122)/(52-67) 122/67    MENTAL STATUS EXAM:  Appearance:Casually dressed, good hygeine.   Cooperation:Cooperative  Psychomotor: No psychomotor agitation/retardation, No EPS, No motor tics  Speech-normal rate, amount.  Mood \"better\"   Affect-congruent, appropriate, stable  Thought Content-goal directed, no delusional material present  Thought process-linear, organized.  Suicidality: No SI  Homicidality: No HI  Perception: No AH/VH  Insight-fair   Judgement-poor    Lab Results (last 24 hours)       Procedure Component Value Units Date/Time    POC Glucose Once [[id number]]  (Abnormal) Collected: 10/05/24 1119    Specimen: Blood Updated: 10/05/24 1139     Glucose 331 mg/dL     POC Glucose Once [[id number]]  (Abnormal) Collected: 10/05/24 0700    Specimen: Blood Updated: 10/05/24 " 0707     Glucose 472 mg/dL     POC Glucose Once [[id number]]  (Abnormal) Collected: 10/05/24 0632    Specimen: Blood Updated: 10/05/24 0640     Glucose 505 mg/dL     POC Glucose Once [[id number]]  (Abnormal) Collected: 10/04/24 1947    Specimen: Blood Updated: 10/04/24 1956     Glucose 232 mg/dL     POC Glucose Once [[id number]]  (Abnormal) Collected: 10/04/24 1838    Specimen: Blood Updated: 10/04/24 1845     Glucose 309 mg/dL     POC Glucose Once [[id number]]  (Abnormal) Collected: 10/04/24 1728    Specimen: Blood Updated: 10/04/24 1737     Glucose 400 mg/dL     POC Glucose Once [[id number]]  (Abnormal) Collected: 10/04/24 1637    Specimen: Blood Updated: 10/04/24 1648     Glucose 424 mg/dL                Imaging Results (Last 24 Hours)       ** No results found for the last 24 hours. **               ECG/EMG Results (most recent)       None             ALLERGIES: Patient has no known allergies.    Medication Review:   Scheduled Medications:  insulin glargine, 35 Units, Subcutaneous, BID  insulin lispro, 3-14 Units, Subcutaneous, 4x Daily AC & at Bedtime  lamoTRIgine, 25 mg, Oral, Daily  Lurasidone HCl, 20 mg, Oral, Daily With Dinner  metFORMIN, 500 mg, Oral, BID With Meals         PRN Medications:    acetaminophen    aluminum-magnesium hydroxide-simethicone    benzonatate    benztropine **OR** benztropine    dextrose    dextrose    famotidine    glucagon (human recombinant)    hydrOXYzine    ibuprofen    loperamide    magnesium hydroxide    ondansetron ODT    polyethylene glycol    sodium chloride    traZODone   All medications reviewed.    ASSESSMENT & PLAN:      Suicidal ideations  -SP 1  -Patient has been acting in an inappropriate and disruptive manner and has not adhered to his dietary restrictions which has been dangerous. Will continue with SP1 for safety.       Bipolar II disorder  -Continue lurasidone and lamotrigine       Alcohol use disorder, severe, dependence  -Encourage sobriety       DM (diabetes  mellitus), type 2  -Metformin  -Lantus  -Sliding scale coverage    Special precautions: Special Precautions Level 1 (1:1 sitter).    Behavioral Health Treatment Plan and Problem List: I have reviewed and approved the Behavioral Health Treatment Plan and Problem list.  The patient has had a chance to review and agrees with the treatment plan.    Copied text in portions of this note has been reviewed and is accurate as of 10/05/24         Clinician:  Ashley Thomas MD  10/05/24  13:23 EDT

## 2024-10-05 NOTE — PLAN OF CARE
Goal Outcome Evaluation:  Plan of Care Reviewed With: patient  Patient Agreement with Plan of Care: agrees     Progress: no change  Outcome Evaluation: Patient reports [id number] /depression0 . Patient became upset with staff and other patient's over TV dispute and attempted self harm in his room. Orders for pt to be 1-1 sitter. Pt denies any HI/AVH. Pt reports poor sleep and good appetite. No distress noted at this time.

## 2024-10-05 NOTE — NURSING NOTE
"Pt's room alarm sounded, when staff arrived to the room, pt was seen actively attempting to cut his R wrist with a comb. Staff then had to pry the comb from the pt's hand.  Pt states \"I want to die. Everyone is being so mean to me and its not fair\". Small superficial cut was observed on pt's right wrist. Staff and security calmed pt down and stayed with pt awaiting further orders.   Prior to this event, pt voiced being upset about the group room TV and not watching what he wants. Emily Darby explained unit rules and pt then remained withdrawn in his room until event occurred.  "

## 2024-10-05 NOTE — PLAN OF CARE
Goal Outcome Evaluation:  Plan of Care Reviewed With: patient  Patient Agreement with Plan of Care: agrees     Progress: improving  Outcome Evaluation: Patient rates anxiety 5/10 and depression 10/10. Pt denies any SI/HI/AVH. Pt remains on SP1 precautions due to self injurous behavior. Pt became irritated and verbally agressive with staff early in shift due to having someone follow him around. Pts behavior improved as day progressed. Pt had no other complaints this shift.

## 2024-10-05 NOTE — NURSING NOTE
"Patient approached nurses station and stated that he \"promises us that he will come off of being a sitter today\". States he can't stand someone following him around all the time. Staff asked if that was a threat and he said yes \"that is a promise and you all will be sorry\". Patient became tearful and continues to yell and curse at staff across room. Patient eventually calmed down and apologized to staff for his outburst.   "

## 2024-10-05 NOTE — NURSING NOTE
Recheck- -092- Notified Dr Thomas at this time to update on patient status/previous treamtent.  Advised to continue with Sliding scale coverage of 14 unit. RBVX2    Patient updated on plan of care.

## 2024-10-05 NOTE — NURSING NOTE
Pt's blood sugar resulted at 514. Per protocol, Dr. Miramontes was contacted. Instructed to administer 25 units of Humalog stat. RBVO

## 2024-10-06 LAB
GLUCOSE BLDC GLUCOMTR-MCNC: 284 MG/DL (ref 70–130)
GLUCOSE BLDC GLUCOMTR-MCNC: 312 MG/DL (ref 70–130)
GLUCOSE BLDC GLUCOMTR-MCNC: 349 MG/DL (ref 70–130)
GLUCOSE BLDC GLUCOMTR-MCNC: 367 MG/DL (ref 70–130)

## 2024-10-06 PROCEDURE — 63710000001 INSULIN GLARGINE PER 5 UNITS: Performed by: PSYCHIATRY & NEUROLOGY

## 2024-10-06 PROCEDURE — 63710000001 INSULIN LISPRO (HUMAN) PER 5 UNITS: Performed by: STUDENT IN AN ORGANIZED HEALTH CARE EDUCATION/TRAINING PROGRAM

## 2024-10-06 PROCEDURE — 82948 REAGENT STRIP/BLOOD GLUCOSE: CPT

## 2024-10-06 PROCEDURE — 99232 SBSQ HOSP IP/OBS MODERATE 35: CPT | Performed by: PSYCHIATRY & NEUROLOGY

## 2024-10-06 RX ADMIN — INSULIN GLARGINE 35 UNITS: 100 INJECTION, SOLUTION SUBCUTANEOUS at 21:15

## 2024-10-06 RX ADMIN — INSULIN LISPRO 10 UNITS: 100 INJECTION, SOLUTION INTRAVENOUS; SUBCUTANEOUS at 12:20

## 2024-10-06 RX ADMIN — INSULIN GLARGINE 35 UNITS: 100 INJECTION, SOLUTION SUBCUTANEOUS at 08:50

## 2024-10-06 RX ADMIN — INSULIN LISPRO 12 UNITS: 100 INJECTION, SOLUTION INTRAVENOUS; SUBCUTANEOUS at 21:15

## 2024-10-06 RX ADMIN — LURASIDONE HYDROCHLORIDE 20 MG: 40 TABLET, FILM COATED ORAL at 17:11

## 2024-10-06 RX ADMIN — METFORMIN HYDROCHLORIDE 500 MG: 500 TABLET ORAL at 17:11

## 2024-10-06 RX ADMIN — METFORMIN HYDROCHLORIDE 500 MG: 500 TABLET ORAL at 08:48

## 2024-10-06 RX ADMIN — INSULIN LISPRO 10 UNITS: 100 INJECTION, SOLUTION INTRAVENOUS; SUBCUTANEOUS at 17:12

## 2024-10-06 RX ADMIN — TRAZODONE HYDROCHLORIDE 50 MG: 50 TABLET ORAL at 21:17

## 2024-10-06 RX ADMIN — HYDROXYZINE HYDROCHLORIDE 50 MG: 50 TABLET, FILM COATED ORAL at 21:17

## 2024-10-06 RX ADMIN — INSULIN LISPRO 8 UNITS: 100 INJECTION, SOLUTION INTRAVENOUS; SUBCUTANEOUS at 07:42

## 2024-10-06 RX ADMIN — LAMOTRIGINE 25 MG: 100 TABLET ORAL at 08:48

## 2024-10-06 RX ADMIN — ALUMINUM HYDROXIDE, MAGNESIUM HYDROXIDE, AND DIMETHICONE 15 ML: 400; 400; 40 SUSPENSION ORAL at 16:25

## 2024-10-06 RX ADMIN — FAMOTIDINE 20 MG: 20 TABLET ORAL at 05:51

## 2024-10-06 NOTE — PLAN OF CARE
Goal Outcome Evaluation:  Plan of Care Reviewed With: patient  Patient Agreement with Plan of Care: agrees        Outcome Evaluation: Pt reports good sleep and appetite. Denies A/D, SI/HI/AVH

## 2024-10-06 NOTE — PLAN OF CARE
Problem: Adult Behavioral Health Plan of Care  Goal: Plan of Care Review  Outcome: Progressing  Flowsheets  Taken 10/6/2024 1131 by Farhat Woodall, RN  Outcome Evaluation: PATIENT DENIES ANY PROBLEMS THIS SHIFT. PATIENT REMAINS SP1 WITH  NO S/S OF ACUTE DISTRESS NOTED  Taken 10/6/2024 0724 by Farhat Woodall, RN  Plan of Care Reviewed With: patient  Patient Agreement with Plan of Care: agrees  Taken 10/5/2024 1640 by Nikki Hillman RN  Progress: improving   Goal Outcome Evaluation:  Plan of Care Reviewed With: patient  Patient Agreement with Plan of Care: agrees        Outcome Evaluation: PATIENT DENIES ANY PROBLEMS THIS SHIFT. PATIENT REMAINS SP1 WITH  NO S/S OF ACUTE DISTRESS NOTED

## 2024-10-06 NOTE — PROGRESS NOTES
"INPATIENT PSYCHIATRIC PROGRESS NOTE    Name:  Ajay Xie  :  2005  MRN:  [id number]  Visit Number:  [id number]  Length of stay:  3    SUBJECTIVE    CC/Focus of Exam: SI, bipolar disorder    INTERVAL HISTORY:  The patient states he is feeling pretty good and stayed to himself and avoided getting into any kind of altercations with anyone. Also trying to eat better.      Review of Systems   Constitutional: Negative.    Respiratory: Negative.     Cardiovascular: Negative.    Gastrointestinal: Negative.        OBJECTIVE    Temp:  [97.3 °F (36.3 °C)] 97.3 °F (36.3 °C)  Heart Rate:  [83] 83  Resp:  [16] 16  BP: (113)/(58) 113/58    MENTAL STATUS EXAM:  Appearance:Casually dressed, good hygeine.   Cooperation:Cooperative  Psychomotor: No psychomotor agitation/retardation, No EPS, No motor tics  Speech-normal rate, amount.  Mood \"better\"   Affect-congruent, appropriate, stable  Thought Content-goal directed, no delusional material present  Thought process-linear, organized.  Suicidality: No SI  Homicidality: No HI  Perception: No AH/VH  Insight-fair   Judgement-poor    Lab Results (last 24 hours)       Procedure Component Value Units Date/Time    POC Glucose Once [[id number]]  (Abnormal) Collected: 10/06/24 0615    Specimen: Blood Updated: 10/06/24 06     Glucose 284 mg/dL     POC Glucose Once [[id number]]  (Abnormal) Collected: 10/05/24 1957    Specimen: Blood Updated: 10/05/24 2005     Glucose 261 mg/dL     POC Glucose Once [[id number]]  (Abnormal) Collected: 10/05/24 1618    Specimen: Blood Updated: 10/05/24 1635     Glucose 284 mg/dL     POC Glucose Once [[id number]]  (Abnormal) Collected: 10/05/24 1119    Specimen: Blood Updated: 10/05/24 1139     Glucose 331 mg/dL                Imaging Results (Last 24 Hours)       ** No results found for the last 24 hours. **               ECG/EMG Results (most recent)       None             ALLERGIES: Patient has no known allergies.    Medication Review:   Scheduled " Medications:  insulin glargine, 35 Units, Subcutaneous, BID  insulin lispro, 3-14 Units, Subcutaneous, 4x Daily AC & at Bedtime  lamoTRIgine, 25 mg, Oral, Daily  Lurasidone HCl, 20 mg, Oral, Daily With Dinner  metFORMIN, 500 mg, Oral, BID With Meals         PRN Medications:    acetaminophen    aluminum-magnesium hydroxide-simethicone    benzonatate    benztropine **OR** benztropine    dextrose    dextrose    famotidine    glucagon (human recombinant)    hydrOXYzine    ibuprofen    loperamide    magnesium hydroxide    ondansetron ODT    polyethylene glycol    sodium chloride    traZODone   All medications reviewed.    ASSESSMENT & PLAN:      Suicidal ideations  -SP 1  -Patient has been acting in an inappropriate and disruptive manner and has not adhered to his dietary restrictions which has been dangerous. Will continue with SP1 for safety.       Bipolar II disorder  -Continue lurasidone and lamotrigine       Alcohol use disorder, severe, dependence  -Encourage sobriety       DM (diabetes mellitus), type 2  -Metformin  -Lantus  -Sliding scale coverage    Special precautions: Special Precautions Level 1 (1:1 sitter).    Behavioral Health Treatment Plan and Problem List: I have reviewed and approved the Behavioral Health Treatment Plan and Problem list.  The patient has had a chance to review and agrees with the treatment plan.    Copied text in portions of this note has been reviewed and is accurate as of 10/06/24         Clinician:  Ashley Thomas MD  10/06/24  08:01 EDT

## 2024-10-07 ENCOUNTER — HOSPITAL ENCOUNTER (EMERGENCY)
Facility: HOSPITAL | Age: 19
Discharge: OTHER INSTITUTION | End: 2024-10-08
Attending: STUDENT IN AN ORGANIZED HEALTH CARE EDUCATION/TRAINING PROGRAM
Payer: COMMERCIAL

## 2024-10-07 VITALS
BODY MASS INDEX: 46.27 KG/M2 | SYSTOLIC BLOOD PRESSURE: 125 MMHG | HEIGHT: 69 IN | OXYGEN SATURATION: 97 % | WEIGHT: 312.4 LBS | TEMPERATURE: 97 F | DIASTOLIC BLOOD PRESSURE: 64 MMHG | RESPIRATION RATE: 18 BRPM | HEART RATE: 85 BPM

## 2024-10-07 DIAGNOSIS — R45.851 SUICIDAL IDEATION: Primary | ICD-10-CM

## 2024-10-07 LAB
GLUCOSE BLDC GLUCOMTR-MCNC: 273 MG/DL (ref 70–130)
GLUCOSE BLDC GLUCOMTR-MCNC: 377 MG/DL (ref 70–130)

## 2024-10-07 PROCEDURE — 81003 URINALYSIS AUTO W/O SCOPE: CPT | Performed by: STUDENT IN AN ORGANIZED HEALTH CARE EDUCATION/TRAINING PROGRAM

## 2024-10-07 PROCEDURE — 63710000001 INSULIN GLARGINE PER 5 UNITS: Performed by: PSYCHIATRY & NEUROLOGY

## 2024-10-07 PROCEDURE — 99239 HOSP IP/OBS DSCHRG MGMT >30: CPT | Performed by: PSYCHIATRY & NEUROLOGY

## 2024-10-07 PROCEDURE — 63710000001 INSULIN LISPRO (HUMAN) PER 5 UNITS: Performed by: STUDENT IN AN ORGANIZED HEALTH CARE EDUCATION/TRAINING PROGRAM

## 2024-10-07 PROCEDURE — 80307 DRUG TEST PRSMV CHEM ANLYZR: CPT | Performed by: STUDENT IN AN ORGANIZED HEALTH CARE EDUCATION/TRAINING PROGRAM

## 2024-10-07 PROCEDURE — 82948 REAGENT STRIP/BLOOD GLUCOSE: CPT

## 2024-10-07 PROCEDURE — 99285 EMERGENCY DEPT VISIT HI MDM: CPT

## 2024-10-07 RX ADMIN — IBUPROFEN 400 MG: 400 TABLET, FILM COATED ORAL at 15:08

## 2024-10-07 RX ADMIN — INSULIN GLARGINE 35 UNITS: 100 INJECTION, SOLUTION SUBCUTANEOUS at 08:25

## 2024-10-07 RX ADMIN — INSULIN LISPRO 8 UNITS: 100 INJECTION, SOLUTION INTRAVENOUS; SUBCUTANEOUS at 06:35

## 2024-10-07 RX ADMIN — INSULIN LISPRO 12 UNITS: 100 INJECTION, SOLUTION INTRAVENOUS; SUBCUTANEOUS at 10:56

## 2024-10-07 RX ADMIN — METFORMIN HYDROCHLORIDE 500 MG: 500 TABLET ORAL at 08:24

## 2024-10-07 NOTE — PROGRESS NOTES
0807    DATA:     Therapist met with the patient individually. Therapist continues reviewing plan of care and aftercare plan.  The patient was agreeable.     ASSESSMENT:     Patient was seen for follow up of Suicidal ideations. Bipolar II disorder. Alcohol use disorder, severe, dependence     Today, patient was seen 1-1 in the office.  Patient observed to be calm, cooperate, and oriented x 4.  Patient observed to have appropriate affect, congruent mood, normal thought content.  Patient denies SI/HI/AVH.  Patient rates depression/anxiety at 0/10.  Patient rates anger at 0/10.  Patient reports good sleep and appetite. Patient reports that he got upset on Friday. He had conflict with some peers here over the tv and went to his room and used a comb to scratch his wrist. Patient observed to have small abrasion to right wrist.  Patient reports that he has done things like that since he was a small child. He reports that he should have walked away, used deep breathing.       CLINICAL MANEUVERING:     Therapist provided safe, secure environment for patient to share.  Provided reflective listening and psychoeducation.  Assisted patient in processing the above session. Assisted patient in identifying any questions or needs to be addressed today.         Concern was voiced regarding substance use and educated patient on risks associated with use. Patient was advised to abstain from use and educated on community resources that can help with sobriety and recovery.  Patient signed consent for [facility]. Their admin was contacted today and agreeable to pick patient up at 2 p.m. if discharged. Dr. Thomas made aware.     Therapist continues to staff daily with Dr. Thomas, RN staff and the patient.      Plan:      Therapist recommends continued assessment.  Patient signed consent for [facility] and has a bed upon discharge.  Their agency will transport.     Therapist assisted the patient in completing the following safety  plan:     SAFETY/MENTAL HEALTH PLAN    1. Recognizing warning signs: Warning signs that a crisis may be developing such as thoughts, images, mood, situation, behaviors:    Suicidal thoughts, nervous break down, anxiety attack     2. Internal coping strategies: Things that the patient can do to take their mind off problems without contacting another person such as relaxation techniques, physical activity, etc:    Walk away, take deep breathes. Use my phone    3. Socialization strategies for distraction and support: People and social settings that provide distraction or support     Get to know the guys where I am going, [facility].     4. Social contact for assistance in resolving crisis: People the patient can ask for help    Grandmother Bella.   Friend Rosalva     5. Professionals or agencies contacts to help resolve crisis: Professionals or agencies the patient can contact during a crisis - clinician name/location/phone/emergency contact number, local urgent care services with address/phone, National Suicide Prevention Lifeline (561), Emergency contact 911:       985, Tell the place I am going to that I need you all again.     6. Means restriction: Ways to make the environment safe    Patient denies access to firearms, weapons, medications. Patient going to secure sober living at [facility].

## 2024-10-07 NOTE — DISCHARGE SUMMARY
:  2005  MRN:  [id number]  Visit Number:  [id number]      Date of Admission:10/3/2024   Date of Discharge:  10/7/2024    Discharge Diagnosis:  Principal Problem:    Suicidal ideations  Active Problems:    Bipolar II disorder    Alcohol use disorder, severe, dependence    DM (diabetes mellitus), type 2        Admission Diagnosis:  Suicidal ideations [R45.851]     HPI  Ajay Xie is a 19 y.o. male who was admitted on 10/3/2024 with complaints of worsening depression and SI.   For details please see H&P dated 10/4/24.     Hospital Course  Patient is a 19 y.o. male presented with depression and suicidal ideations. The patient got upset at the rehab facility for being held accountable for his behaviors and verbalized suicidal ideations and was brought to the hospital. The patient was admitted to the adult psych unit for safety, further evaluation and treatment.  He reported that he got upset because he was not following the rules and although he was explained all the rules when he went to the rehab he deliberately chose not to follow them.  The patient's behaviors appear to be driven more by his underlying personality disorder with antisocial traits.  He got upset with the peers in the day room when he did not get to dictate what channel to watch on TV.  He went into his room and tried to use a comb to scratch his wrist.  He was then put on SP1.  Another reason for SP1 was that patient refused to adhere to his dietary restrictions for his diabetes and when the snacks were taken away he would take food from other patients place.  After patient was put on SP1 with constant one-to-one monitoring he expressed remorse for his behaviors and wanted to go to rehab.  The patient was accepted to the [facility] for rehab treatment.  He verbalized that he had learned his lesson and that he would try to follow the rules and work on his sobriety.  The patient was continued on his home medications and was also able to  "participate in individual and group counseling sessions.    Mental Status Exam upon discharge:   Mood \"good\"   Affect-congruent, appropriate, stable  Thought Content-goal directed, no delusional material present  Thought process-linear, organized.  Suicidality: No SI  Homicidality: No HI  Perception: No AH/VH    Procedures Performed         Consults:   Consults       No orders found from 9/4/2024 to 10/4/2024.            Pertinent Test Results:   Admission on 10/03/2024   Component Date Value Ref Range Status    Hepatitis B Surface Ag 10/03/2024 Non-Reactive  Non-Reactive Final    Hep A IgM 10/03/2024 Non-Reactive  Non-Reactive Final    Hep B C IgM 10/03/2024 Non-Reactive  Non-Reactive Final    Hepatitis C Ab 10/03/2024 Non-Reactive  Non-Reactive Final    Glucose 10/04/2024 313 (H)  70 - 130 mg/dL Final    Glucose 10/04/2024 431 (C)  70 - 130 mg/dL Final    Glucose 10/04/2024 319 (H)  70 - 130 mg/dL Final    Glucose 10/04/2024 424 (C)  70 - 130 mg/dL Final    Glucose 10/04/2024 400 (H)  70 - 130 mg/dL Final    Glucose 10/04/2024 309 (H)  70 - 130 mg/dL Final    Glucose 10/04/2024 232 (H)  70 - 130 mg/dL Final    Glucose 10/05/2024 505 (C)  70 - 130 mg/dL Final    Glucose 10/05/2024 472 (C)  70 - 130 mg/dL Final    Glucose 10/05/2024 331 (H)  70 - 130 mg/dL Final    Glucose 10/05/2024 284 (H)  70 - 130 mg/dL Final    Glucose 10/05/2024 261 (H)  70 - 130 mg/dL Final    Glucose 10/06/2024 284 (H)  70 - 130 mg/dL Final    Glucose 10/06/2024 349 (H)  70 - 130 mg/dL Final    Glucose 10/06/2024 312 (H)  70 - 130 mg/dL Final    Glucose 10/06/2024 367 (H)  70 - 130 mg/dL Final    Glucose 10/07/2024 273 (H)  70 - 130 mg/dL Final   Admission on 10/03/2024, Discharged on 10/03/2024   Component Date Value Ref Range Status    Glucose 10/03/2024 344 (H)  70 - 130 mg/dL Final    QT Interval 10/03/2024 342  ms Final    QTC Interval 10/03/2024 452  ms Final    Glucose 10/03/2024 378 (H)  65 - 99 mg/dL Final    BUN 10/03/2024 16  6 - " 20 mg/dL Final    Creatinine 10/03/2024 0.68 (L)  0.76 - 1.27 mg/dL Final    Sodium 10/03/2024 138  136 - 145 mmol/L Final    Potassium 10/03/2024 4.4  3.5 - 5.2 mmol/L Final    Specimen hemolyzed.  Result may be falsely elevated.  4+ lipemia    Chloride 10/03/2024 104  98 - 107 mmol/L Final    CO2 10/03/2024 19.3 (L)  22.0 - 29.0 mmol/L Final    Calcium 10/03/2024 9.4  8.6 - 10.5 mg/dL Final    Total Protein 10/03/2024 7.2  6.0 - 8.5 g/dL Final    Albumin 10/03/2024 3.9  3.5 - 5.2 g/dL Final    ALT (SGPT) 10/03/2024 7  1 - 41 U/L Final    Specimen hemolyzed.  Result may  be falsely elevated.  4+ lipemia    AST (SGOT) 10/03/2024 36  1 - 40 U/L Final    Alkaline Phosphatase 10/03/2024 133 (H)  39 - 117 U/L Final    Total Bilirubin 10/03/2024 0.6  0.0 - 1.2 mg/dL Final    Globulin 10/03/2024 3.3  gm/dL Final    A/G Ratio 10/03/2024 1.2  g/dL Final    BUN/Creatinine Ratio 10/03/2024 23.5  7.0 - 25.0 Final    Anion Gap 10/03/2024 14.7  5.0 - 15.0 mmol/L Final    eGFR 10/03/2024 135.6  >60.0 mL/min/1.73 Final    Color, UA 10/03/2024 Yellow  Yellow, Straw Final    Appearance, UA 10/03/2024 Clear  Clear Final    pH, UA 10/03/2024 5.5  5.0 - 8.0 Final    Specific Gravity, UA 10/03/2024 >1.030 (H)  1.005 - 1.030 Final    Glucose, UA 10/03/2024 >=1000 mg/dL (3+) (A)  Negative Final    Ketones, UA 10/03/2024 Trace (A)  Negative Final    Bilirubin, UA 10/03/2024 Negative  Negative Final    Blood, UA 10/03/2024 Negative  Negative Final    Protein, UA 10/03/2024 Negative  Negative Final    Leuk Esterase, UA 10/03/2024 Negative  Negative Final    Nitrite, UA 10/03/2024 Negative  Negative Final    Urobilinogen, UA 10/03/2024 1.0 E.U./dL  0.2 - 1.0 E.U./dL Final    Acetone 10/03/2024 Negative  Negative Final    THC, Screen, Urine 10/03/2024 Negative  Negative Final    Phencyclidine (PCP), Urine 10/03/2024 Negative  Negative Final    Cocaine Screen, Urine 10/03/2024 Negative  Negative Final    Methamphetamine, Ur 10/03/2024 Negative   Negative Final    Opiate Screen 10/03/2024 Negative  Negative Final    Amphetamine Screen, Urine 10/03/2024 Negative  Negative Final    Benzodiazepine Screen, Urine 10/03/2024 Negative  Negative Final    Tricyclic Antidepressants Screen 10/03/2024 Negative  Negative Final    Methadone Screen, Urine 10/03/2024 Negative  Negative Final    Barbiturates Screen, Urine 10/03/2024 Negative  Negative Final    Oxycodone Screen, Urine 10/03/2024 Negative  Negative Final    Buprenorphine, Screen, Urine 10/03/2024 Negative  Negative Final    Extra Tube 10/03/2024 Hold for add-ons.   Final    Auto resulted.    Extra Tube 10/03/2024 hold for add-on   Final    Auto resulted    Extra Tube 10/03/2024 Hold for add-ons.   Final    Auto resulted.    Extra Tube 10/03/2024 Hold for add-ons.   Final    Auto resulted    WBC 10/03/2024 4.21  3.40 - 10.80 10*3/mm3 Final    RBC 10/03/2024 4.20  4.14 - 5.80 10*6/mm3 Final    Hemoglobin 10/03/2024 14.4  13.0 - 17.7 g/dL Final    Hematocrit 10/03/2024 38.8  37.5 - 51.0 % Final    MCV 10/03/2024 92.4  79.0 - 97.0 fL Final    MCH 10/03/2024 34.3 (H)  26.6 - 33.0 pg Final    MCHC 10/03/2024 37.1 (H)  31.5 - 35.7 g/dL Final    RDW 10/03/2024 13.0  12.3 - 15.4 % Final    RDW-SD 10/03/2024 43.9  37.0 - 54.0 fl Final    MPV 10/03/2024 10.5  6.0 - 12.0 fL Final    Platelets 10/03/2024 133 (L)  140 - 450 10*3/mm3 Final    Neutrophil % 10/03/2024 54.5  42.7 - 76.0 % Final    Lymphocyte % 10/03/2024 31.4  19.6 - 45.3 % Final    Monocyte % 10/03/2024 10.5  5.0 - 12.0 % Final    Eosinophil % 10/03/2024 2.9  0.3 - 6.2 % Final    Basophil % 10/03/2024 0.5  0.0 - 1.5 % Final    Immature Grans % 10/03/2024 0.2  0.0 - 0.5 % Final    Neutrophils, Absolute 10/03/2024 2.30  1.70 - 7.00 10*3/mm3 Final    Lymphocytes, Absolute 10/03/2024 1.32  0.70 - 3.10 10*3/mm3 Final    Monocytes, Absolute 10/03/2024 0.44  0.10 - 0.90 10*3/mm3 Final    Eosinophils, Absolute 10/03/2024 0.12  0.00 - 0.40 10*3/mm3 Final    Basophils,  Absolute 10/03/2024 0.02  0.00 - 0.20 10*3/mm3 Final    Immature Grans, Absolute 10/03/2024 0.01  0.00 - 0.05 10*3/mm3 Final    nRBC 10/03/2024 0.0  0.0 - 0.2 /100 WBC Final    Anisocytosis 10/03/2024 Slight/1+  None Seen Final    Platelet Morphology 10/03/2024 Normal  Normal Final    Fentanyl, Urine 10/03/2024 Negative  Negative Final    Glucose 10/03/2024 349 (H)  70 - 130 mg/dL Final    Ethanol 10/03/2024 <10  0 - 10 mg/dL Final    Ethanol % 10/03/2024 <0.010  % Final    Magnesium 10/03/2024 1.6  1.6 - 2.6 mg/dL Final    Glucose 10/03/2024 298 (H)  70 - 130 mg/dL Final    QT Interval 10/03/2024 370  ms Final    QTC Interval 10/03/2024 440  ms Final   Admission on 09/25/2024, Discharged on 09/30/2024   Component Date Value Ref Range Status    Hepatitis B Surface Ag 09/25/2024 Non-Reactive  Non-Reactive Final    Hep A IgM 09/25/2024 Non-Reactive  Non-Reactive Final    Hep B C IgM 09/25/2024 Non-Reactive  Non-Reactive Final    Hepatitis C Ab 09/25/2024 Non-Reactive  Non-Reactive Final    QT Interval 09/26/2024 414  ms Final    QTC Interval 09/26/2024 453  ms Final    Glucose 09/26/2024 276 (H)  70 - 130 mg/dL Final    Glucose 09/26/2024 299 (H)  70 - 130 mg/dL Final    Glucose 09/26/2024 321 (H)  70 - 130 mg/dL Final    Glucose 09/26/2024 299 (H)  70 - 130 mg/dL Final    Glucose 09/27/2024 362 (H)  70 - 130 mg/dL Final    Glucose 09/27/2024 514 (C)  70 - 130 mg/dL Final    Glucose 09/27/2024 436 (C)  70 - 130 mg/dL Final    Glucose 09/27/2024 421 (C)  70 - 130 mg/dL Final    Glucose 09/27/2024 287 (H)  70 - 130 mg/dL Final    Glucose 09/27/2024 273 (H)  70 - 130 mg/dL Final    Glucose 09/27/2024 299 (H)  70 - 130 mg/dL Final    Glucose 09/28/2024 293 (H)  70 - 130 mg/dL Final    Glucose 09/28/2024 326 (H)  70 - 130 mg/dL Final    Glucose 09/28/2024 191 (H)  70 - 130 mg/dL Final    Glucose 09/28/2024 282 (H)  70 - 130 mg/dL Final    Glucose 09/29/2024 383 (H)  70 - 130 mg/dL Final    Glucose 09/29/2024 356 (H)  70 -  130 mg/dL Final    Glucose 09/29/2024 338 (H)  70 - 130 mg/dL Final    Glucose 09/29/2024 310 (H)  70 - 130 mg/dL Final    Glucose 09/29/2024 350 (H)  70 - 130 mg/dL Final    Glucose 09/30/2024 223 (H)  70 - 130 mg/dL Final    Glucose 09/30/2024 366 (H)  70 - 130 mg/dL Final   Admission on 09/25/2024, Discharged on 09/25/2024   Component Date Value Ref Range Status    Glucose 09/25/2024 383 (H)  65 - 99 mg/dL Final    BUN 09/25/2024 11  6 - 20 mg/dL Final    Creatinine 09/25/2024 0.70 (L)  0.76 - 1.27 mg/dL Final    Sodium 09/25/2024 136  136 - 145 mmol/L Final    Potassium 09/25/2024 4.1  3.5 - 5.2 mmol/L Final    Slight hemolysis detected by analyzer. Result may be falsely elevated.    Chloride 09/25/2024 103  98 - 107 mmol/L Final    CO2 09/25/2024 21.2 (L)  22.0 - 29.0 mmol/L Final    Calcium 09/25/2024 9.4  8.6 - 10.5 mg/dL Final    Total Protein 09/25/2024 7.7  6.0 - 8.5 g/dL Final    Albumin 09/25/2024 4.3  3.5 - 5.2 g/dL Final    ALT (SGPT) 09/25/2024 48 (H)  1 - 41 U/L Final    AST (SGOT) 09/25/2024 38  1 - 40 U/L Final    Alkaline Phosphatase 09/25/2024 135 (H)  39 - 117 U/L Final    Total Bilirubin 09/25/2024 0.8  0.0 - 1.2 mg/dL Final    Globulin 09/25/2024 3.4  gm/dL Final    A/G Ratio 09/25/2024 1.3  g/dL Final    BUN/Creatinine Ratio 09/25/2024 15.7  7.0 - 25.0 Final    Anion Gap 09/25/2024 11.8  5.0 - 15.0 mmol/L Final    eGFR 09/25/2024 134.4  >60.0 mL/min/1.73 Final    Color, UA 09/25/2024 Yellow  Yellow, Straw Final    Appearance, UA 09/25/2024 Clear  Clear Final    pH, UA 09/25/2024 7.0  5.0 - 8.0 Final    Specific Gravity, UA 09/25/2024 >1.030 (H)  1.005 - 1.030 Final    Glucose, UA 09/25/2024 >=1000 mg/dL (3+) (A)  Negative Final    Ketones, UA 09/25/2024 Negative  Negative Final    Bilirubin, UA 09/25/2024 Negative  Negative Final    Blood, UA 09/25/2024 Negative  Negative Final    Protein, UA 09/25/2024 Trace (A)  Negative Final    Leuk Esterase, UA 09/25/2024 Negative  Negative Final     Nitrite, UA 09/25/2024 Negative  Negative Final    Urobilinogen, UA 09/25/2024 1.0 E.U./dL  0.2 - 1.0 E.U./dL Final    THC, Screen, Urine 09/25/2024 Negative  Negative Final    Phencyclidine (PCP), Urine 09/25/2024 Negative  Negative Final    Cocaine Screen, Urine 09/25/2024 Negative  Negative Final    Methamphetamine, Ur 09/25/2024 Negative  Negative Final    Opiate Screen 09/25/2024 Negative  Negative Final    Amphetamine Screen, Urine 09/25/2024 Negative  Negative Final    Benzodiazepine Screen, Urine 09/25/2024 Negative  Negative Final    Tricyclic Antidepressants Screen 09/25/2024 Negative  Negative Final    Methadone Screen, Urine 09/25/2024 Negative  Negative Final    Barbiturates Screen, Urine 09/25/2024 Negative  Negative Final    Oxycodone Screen, Urine 09/25/2024 Negative  Negative Final    Buprenorphine, Screen, Urine 09/25/2024 Negative  Negative Final    Magnesium 09/25/2024 2.0  1.6 - 2.6 mg/dL Final    Ethanol 09/25/2024 <10  0 - 10 mg/dL Final    Ethanol % 09/25/2024 <0.010  % Final    WBC 09/25/2024 4.34  3.40 - 10.80 10*3/mm3 Final    RBC 09/25/2024 4.38  4.14 - 5.80 10*6/mm3 Final    Hemoglobin 09/25/2024 14.1  13.0 - 17.7 g/dL Final    Hematocrit 09/25/2024 39.8  37.5 - 51.0 % Final    MCV 09/25/2024 90.9  79.0 - 97.0 fL Final    MCH 09/25/2024 32.2  26.6 - 33.0 pg Final    MCHC 09/25/2024 35.4  31.5 - 35.7 g/dL Final    RDW 09/25/2024 12.8  12.3 - 15.4 % Final    RDW-SD 09/25/2024 42.1  37.0 - 54.0 fl Final    MPV 09/25/2024 10.0  6.0 - 12.0 fL Final    Platelets 09/25/2024 112 (L)  140 - 450 10*3/mm3 Final    Neutrophil % 09/25/2024 46.5  42.7 - 76.0 % Final    Lymphocyte % 09/25/2024 40.6  19.6 - 45.3 % Final    Monocyte % 09/25/2024 10.4  5.0 - 12.0 % Final    Eosinophil % 09/25/2024 1.8  0.3 - 6.2 % Final    Basophil % 09/25/2024 0.2  0.0 - 1.5 % Final    Immature Grans % 09/25/2024 0.5  0.0 - 0.5 % Final    Neutrophils, Absolute 09/25/2024 2.02  1.70 - 7.00 10*3/mm3 Final    Lymphocytes,  Absolute 09/25/2024 1.76  0.70 - 3.10 10*3/mm3 Final    Monocytes, Absolute 09/25/2024 0.45  0.10 - 0.90 10*3/mm3 Final    Eosinophils, Absolute 09/25/2024 0.08  0.00 - 0.40 10*3/mm3 Final    Basophils, Absolute 09/25/2024 0.01  0.00 - 0.20 10*3/mm3 Final    Immature Grans, Absolute 09/25/2024 0.02  0.00 - 0.05 10*3/mm3 Final    nRBC 09/25/2024 0.0  0.0 - 0.2 /100 WBC Final    Fentanyl, Urine 09/25/2024 Negative  Negative Final   Admission on 09/25/2024, Discharged on 09/25/2024   Component Date Value Ref Range Status    Glucose 09/25/2024 463 (C)  65 - 99 mg/dL Final    BUN 09/25/2024 11  6 - 20 mg/dL Final    Creatinine 09/25/2024 0.78  0.76 - 1.27 mg/dL Final    Sodium 09/25/2024 135 (L)  136 - 145 mmol/L Final    Potassium 09/25/2024 4.4  3.5 - 5.2 mmol/L Final    Slight hemolysis detected by analyzer. Result may be falsely elevated.    Chloride 09/25/2024 103  98 - 107 mmol/L Final    CO2 09/25/2024 15.2 (L)  22.0 - 29.0 mmol/L Final    Calcium 09/25/2024 9.2  8.6 - 10.5 mg/dL Final    Total Protein 09/25/2024 7.4  6.0 - 8.5 g/dL Final    Albumin 09/25/2024 3.8  3.5 - 5.2 g/dL Final    ALT (SGPT) 09/25/2024 47 (H)  1 - 41 U/L Final    AST (SGOT) 09/25/2024 43 (H)  1 - 40 U/L Final    Alkaline Phosphatase 09/25/2024 142 (H)  39 - 117 U/L Final    Total Bilirubin 09/25/2024 0.9  0.0 - 1.2 mg/dL Final    Globulin 09/25/2024 3.6  gm/dL Final    A/G Ratio 09/25/2024 1.1  g/dL Final    BUN/Creatinine Ratio 09/25/2024 14.1  7.0 - 25.0 Final    Anion Gap 09/25/2024 16.8 (H)  5.0 - 15.0 mmol/L Final    eGFR 09/25/2024 130.1  >60.0 mL/min/1.73 Final    Color, UA 09/25/2024 Yellow  Yellow, Straw Final    Appearance, UA 09/25/2024 Clear  Clear Final    pH, UA 09/25/2024 6.5  5.0 - 8.0 Final    Specific Gravity, UA 09/25/2024 >1.030 (H)  1.005 - 1.030 Final    Glucose, UA 09/25/2024 >=1000 mg/dL (3+) (A)  Negative Final    Ketones, UA 09/25/2024 Negative  Negative Final    Bilirubin, UA 09/25/2024 Negative  Negative Final     Blood, UA 09/25/2024 Negative  Negative Final    Protein, UA 09/25/2024 Negative  Negative Final    Leuk Esterase, UA 09/25/2024 Negative  Negative Final    Nitrite, UA 09/25/2024 Negative  Negative Final    Urobilinogen, UA 09/25/2024 0.2 E.U./dL  0.2 - 1.0 E.U./dL Final    THC, Screen, Urine 09/25/2024 Negative  Negative Final    Phencyclidine (PCP), Urine 09/25/2024 Negative  Negative Final    Cocaine Screen, Urine 09/25/2024 Negative  Negative Final    Methamphetamine, Ur 09/25/2024 Negative  Negative Final    Opiate Screen 09/25/2024 Negative  Negative Final    Amphetamine Screen, Urine 09/25/2024 Negative  Negative Final    Benzodiazepine Screen, Urine 09/25/2024 Negative  Negative Final    Tricyclic Antidepressants Screen 09/25/2024 Negative  Negative Final    Methadone Screen, Urine 09/25/2024 Negative  Negative Final    Barbiturates Screen, Urine 09/25/2024 Negative  Negative Final    Oxycodone Screen, Urine 09/25/2024 Negative  Negative Final    Buprenorphine, Screen, Urine 09/25/2024 Negative  Negative Final    Ethanol 09/25/2024 <10  0 - 10 mg/dL Final    Ethanol % 09/25/2024 <0.010  % Final    Magnesium 09/25/2024 2.0  1.6 - 2.6 mg/dL Final    WBC 09/25/2024 4.03  3.40 - 10.80 10*3/mm3 Final    RBC 09/25/2024 4.35  4.14 - 5.80 10*6/mm3 Final    Hemoglobin 09/25/2024 14.2  13.0 - 17.7 g/dL Final    Hematocrit 09/25/2024 41.0  37.5 - 51.0 % Final    MCV 09/25/2024 94.3  79.0 - 97.0 fL Final    MCH 09/25/2024 32.6  26.6 - 33.0 pg Final    MCHC 09/25/2024 34.6  31.5 - 35.7 g/dL Final    RDW 09/25/2024 12.7  12.3 - 15.4 % Final    RDW-SD 09/25/2024 43.8  37.0 - 54.0 fl Final    MPV 09/25/2024 10.6  6.0 - 12.0 fL Final    Platelets 09/25/2024 54 (L)  140 - 450 10*3/mm3 Final    Neutrophil % 09/25/2024 54.1  42.7 - 76.0 % Final    Lymphocyte % 09/25/2024 33.3  19.6 - 45.3 % Final    Monocyte % 09/25/2024 10.2  5.0 - 12.0 % Final    Eosinophil % 09/25/2024 2.0  0.3 - 6.2 % Final    Basophil % 09/25/2024 0.2   0.0 - 1.5 % Final    Immature Grans % 09/25/2024 0.2  0.0 - 0.5 % Final    Neutrophils, Absolute 09/25/2024 2.18  1.70 - 7.00 10*3/mm3 Final    Lymphocytes, Absolute 09/25/2024 1.34  0.70 - 3.10 10*3/mm3 Final    Monocytes, Absolute 09/25/2024 0.41  0.10 - 0.90 10*3/mm3 Final    Eosinophils, Absolute 09/25/2024 0.08  0.00 - 0.40 10*3/mm3 Final    Basophils, Absolute 09/25/2024 0.01  0.00 - 0.20 10*3/mm3 Final    Immature Grans, Absolute 09/25/2024 0.01  0.00 - 0.05 10*3/mm3 Final    nRBC 09/25/2024 0.0  0.0 - 0.2 /100 WBC Final    Fentanyl, Urine 09/25/2024 Negative  Negative Final    RBC Morphology 09/25/2024 Normal  Normal Final    Clumped Platelets 09/25/2024 Present  None Seen Final    Acetone 09/25/2024 Negative  Negative Final   Admission on 09/18/2024, Discharged on 09/23/2024   Component Date Value Ref Range Status    Hepatitis B Surface Ag 09/18/2024 Non-Reactive  Non-Reactive Final    Hep A IgM 09/18/2024 Non-Reactive  Non-Reactive Final    Hep B C IgM 09/18/2024 Non-Reactive  Non-Reactive Final    Hepatitis C Ab 09/18/2024 Non-Reactive  Non-Reactive Final    QT Interval 09/18/2024 408  ms Final    QTC Interval 09/18/2024 443  ms Final    Glucose 09/18/2024 208 (H)  70 - 130 mg/dL Final    Glucose 09/18/2024 273 (H)  70 - 130 mg/dL Final    Glucose 09/18/2024 261 (H)  70 - 130 mg/dL Final    Glucose 09/18/2024 320 (H)  70 - 130 mg/dL Final    Glucose 09/19/2024 270 (H)  70 - 130 mg/dL Final    Glucose 09/19/2024 238 (H)  70 - 130 mg/dL Final    Glucose 09/19/2024 241 (H)  70 - 130 mg/dL Final    Glucose 09/19/2024 336 (H)  70 - 130 mg/dL Final    TSH 09/20/2024 1.470  0.270 - 4.200 uIU/mL Final    Glucose 09/20/2024 268 (H)  70 - 130 mg/dL Final    Glucose 09/20/2024 282 (H)  70 - 130 mg/dL Final    Glucose 09/20/2024 379 (H)  70 - 130 mg/dL Final    Glucose 09/20/2024 351 (H)  70 - 130 mg/dL Final    Glucose 09/21/2024 289 (H)  70 - 130 mg/dL Final    Glucose 09/21/2024 300 (H)  70 - 130 mg/dL Final     Glucose 09/21/2024 394 (H)  70 - 130 mg/dL Final    Glucose 09/21/2024 375 (H)  70 - 130 mg/dL Final    Glucose 09/22/2024 275 (H)  70 - 130 mg/dL Final    Glucose 09/22/2024 434 (C)  70 - 130 mg/dL Final    Glucose 09/22/2024 284 (H)  70 - 130 mg/dL Final    Glucose 09/22/2024 314 (H)  70 - 130 mg/dL Final    Glucose 09/23/2024 213 (H)  70 - 130 mg/dL Final    Glucose 09/23/2024 237 (H)  70 - 130 mg/dL Final   Admission on 09/17/2024, Discharged on 09/18/2024   Component Date Value Ref Range Status    Glucose 09/17/2024 272 (H)  65 - 99 mg/dL Final    BUN 09/17/2024 13  6 - 20 mg/dL Final    Creatinine 09/17/2024 0.66 (L)  0.76 - 1.27 mg/dL Final    Sodium 09/17/2024 137  136 - 145 mmol/L Final    Potassium 09/17/2024 4.0  3.5 - 5.2 mmol/L Final    Slight hemolysis detected by analyzer. Result may be falsely elevated.    Chloride 09/17/2024 106  98 - 107 mmol/L Final    CO2 09/17/2024 21.7 (L)  22.0 - 29.0 mmol/L Final    Calcium 09/17/2024 9.4  8.6 - 10.5 mg/dL Final    Total Protein 09/17/2024 7.0  6.0 - 8.5 g/dL Final    Albumin 09/17/2024 4.0  3.5 - 5.2 g/dL Final    ALT (SGPT) 09/17/2024 34  1 - 41 U/L Final    AST (SGOT) 09/17/2024 35  1 - 40 U/L Final    Alkaline Phosphatase 09/17/2024 119 (H)  39 - 117 U/L Final    Total Bilirubin 09/17/2024 0.5  0.0 - 1.2 mg/dL Final    Globulin 09/17/2024 3.0  gm/dL Final    A/G Ratio 09/17/2024 1.3  g/dL Final    BUN/Creatinine Ratio 09/17/2024 19.7  7.0 - 25.0 Final    Anion Gap 09/17/2024 9.3  5.0 - 15.0 mmol/L Final    eGFR 09/17/2024 136.9  >60.0 mL/min/1.73 Final    Color, UA 09/17/2024 Yellow  Yellow, Straw Final    Appearance, UA 09/17/2024 Turbid (A)  Clear Final    pH, UA 09/17/2024 7.0  5.0 - 8.0 Final    Specific Gravity, UA 09/17/2024 1.026  1.005 - 1.030 Final    Glucose, UA 09/17/2024 >=1000 mg/dL (3+) (A)  Negative Final    Ketones, UA 09/17/2024 Negative  Negative Final    Bilirubin, UA 09/17/2024 Negative  Negative Final    Blood, UA 09/17/2024 Negative   Negative Final    Protein, UA 09/17/2024 Negative  Negative Final    Leuk Esterase, UA 09/17/2024 Negative  Negative Final    Nitrite, UA 09/17/2024 Negative  Negative Final    Urobilinogen, UA 09/17/2024 1.0 E.U./dL  0.2 - 1.0 E.U./dL Final    THC, Screen, Urine 09/17/2024 Negative  Negative Final    Phencyclidine (PCP), Urine 09/17/2024 Negative  Negative Final    Cocaine Screen, Urine 09/17/2024 Negative  Negative Final    Methamphetamine, Ur 09/17/2024 Negative  Negative Final    Opiate Screen 09/17/2024 Negative  Negative Final    Amphetamine Screen, Urine 09/17/2024 Negative  Negative Final    Benzodiazepine Screen, Urine 09/17/2024 Negative  Negative Final    Tricyclic Antidepressants Screen 09/17/2024 Negative  Negative Final    Methadone Screen, Urine 09/17/2024 Negative  Negative Final    Barbiturates Screen, Urine 09/17/2024 Negative  Negative Final    Oxycodone Screen, Urine 09/17/2024 Negative  Negative Final    Buprenorphine, Screen, Urine 09/17/2024 Negative  Negative Final    Magnesium 09/17/2024 1.7  1.6 - 2.6 mg/dL Final    Ethanol 09/17/2024 <10  0 - 10 mg/dL Final    Ethanol % 09/17/2024 <0.010  % Final    WBC 09/17/2024 3.63  3.40 - 10.80 10*3/mm3 Final    RBC 09/17/2024 4.00 (L)  4.14 - 5.80 10*6/mm3 Final    Hemoglobin 09/17/2024 12.9 (L)  13.0 - 17.7 g/dL Final    Hematocrit 09/17/2024 36.1 (L)  37.5 - 51.0 % Final    MCV 09/17/2024 90.3  79.0 - 97.0 fL Final    MCH 09/17/2024 32.3  26.6 - 33.0 pg Final    MCHC 09/17/2024 35.7  31.5 - 35.7 g/dL Final    RDW 09/17/2024 12.4  12.3 - 15.4 % Final    RDW-SD 09/17/2024 40.8  37.0 - 54.0 fl Final    MPV 09/17/2024 10.0  6.0 - 12.0 fL Final    Platelets 09/17/2024 104 (L)  140 - 450 10*3/mm3 Final    Neutrophil % 09/17/2024 44.6  42.7 - 76.0 % Final    Lymphocyte % 09/17/2024 42.1  19.6 - 45.3 % Final    Monocyte % 09/17/2024 10.5  5.0 - 12.0 % Final    Eosinophil % 09/17/2024 2.2  0.3 - 6.2 % Final    Basophil % 09/17/2024 0.3  0.0 - 1.5 % Final     Immature Grans % 09/17/2024 0.3  0.0 - 0.5 % Final    Neutrophils, Absolute 09/17/2024 1.62 (L)  1.70 - 7.00 10*3/mm3 Final    Lymphocytes, Absolute 09/17/2024 1.53  0.70 - 3.10 10*3/mm3 Final    Monocytes, Absolute 09/17/2024 0.38  0.10 - 0.90 10*3/mm3 Final    Eosinophils, Absolute 09/17/2024 0.08  0.00 - 0.40 10*3/mm3 Final    Basophils, Absolute 09/17/2024 0.01  0.00 - 0.20 10*3/mm3 Final    Immature Grans, Absolute 09/17/2024 0.01  0.00 - 0.05 10*3/mm3 Final    nRBC 09/17/2024 0.0  0.0 - 0.2 /100 WBC Final    Extra Tube 09/17/2024 Hold for add-ons.   Final    Auto resulted.    Extra Tube 09/17/2024 hold for add-on   Final    Auto resulted    Extra Tube 09/17/2024 Hold for add-ons.   Final    Auto resulted.    Extra Tube 09/17/2024 Hold for add-ons.   Final    Auto resulted    Fentanyl, Urine 09/17/2024 Negative  Negative Final    Glucose 09/18/2024 177 (H)  70 - 130 mg/dL Final   Admission on 09/10/2024, Discharged on 09/11/2024   Component Date Value Ref Range Status    Glucose 09/10/2024 179 (H)  65 - 99 mg/dL Final    BUN 09/10/2024 13  6 - 20 mg/dL Final    Creatinine 09/10/2024 0.75 (L)  0.76 - 1.27 mg/dL Final    Sodium 09/10/2024 139  136 - 145 mmol/L Final    Potassium 09/10/2024 3.5  3.5 - 5.2 mmol/L Final    Slight hemolysis detected by analyzer. Result may be falsely elevated.    Chloride 09/10/2024 103  98 - 107 mmol/L Final    CO2 09/10/2024 23.8  22.0 - 29.0 mmol/L Final    Calcium 09/10/2024 10.4  8.6 - 10.5 mg/dL Final    Total Protein 09/10/2024 7.4  6.0 - 8.5 g/dL Final    Albumin 09/10/2024 4.2  3.5 - 5.2 g/dL Final    ALT (SGPT) 09/10/2024 30  1 - 41 U/L Final    AST (SGOT) 09/10/2024 31  1 - 40 U/L Final    Alkaline Phosphatase 09/10/2024 127 (H)  39 - 117 U/L Final    Total Bilirubin 09/10/2024 0.6  0.0 - 1.2 mg/dL Final    Globulin 09/10/2024 3.2  gm/dL Final    A/G Ratio 09/10/2024 1.3  g/dL Final    BUN/Creatinine Ratio 09/10/2024 17.3  7.0 - 25.0 Final    Anion Gap 09/10/2024 12.2  5.0  - 15.0 mmol/L Final    eGFR 09/10/2024 131.7  >60.0 mL/min/1.73 Final    Lipase 09/10/2024 35  13 - 60 U/L Final    Color, UA 09/10/2024 Yellow  Yellow, Straw Final    Appearance, UA 09/10/2024 Clear  Clear Final    pH, UA 09/10/2024 6.0  5.0 - 8.0 Final    Specific Gravity, UA 09/10/2024 1.018  1.005 - 1.030 Final    Glucose, UA 09/10/2024 100 mg/dL (Trace) (A)  Negative Final    Ketones, UA 09/10/2024 Negative  Negative Final    Bilirubin, UA 09/10/2024 Negative  Negative Final    Blood, UA 09/10/2024 Negative  Negative Final    Protein, UA 09/10/2024 Negative  Negative Final    Leuk Esterase, UA 09/10/2024 Negative  Negative Final    Nitrite, UA 09/10/2024 Negative  Negative Final    Urobilinogen, UA 09/10/2024 1.0 E.U./dL  0.2 - 1.0 E.U./dL Final    Extra Tube 09/10/2024 Hold for add-ons.   Final    Auto resulted.    Extra Tube 09/10/2024 hold for add-on   Final    Auto resulted    Extra Tube 09/10/2024 Hold for add-ons.   Final    Auto resulted.    Extra Tube 09/10/2024 Hold for add-ons.   Final    Auto resulted    WBC 09/10/2024 4.84  3.40 - 10.80 10*3/mm3 Final    RBC 09/10/2024 4.33  4.14 - 5.80 10*6/mm3 Final    Hemoglobin 09/10/2024 13.8  13.0 - 17.7 g/dL Final    Hematocrit 09/10/2024 39.2  37.5 - 51.0 % Final    MCV 09/10/2024 90.5  79.0 - 97.0 fL Final    MCH 09/10/2024 31.9  26.6 - 33.0 pg Final    MCHC 09/10/2024 35.2  31.5 - 35.7 g/dL Final    RDW 09/10/2024 12.5  12.3 - 15.4 % Final    RDW-SD 09/10/2024 40.8  37.0 - 54.0 fl Final    MPV 09/10/2024 9.9  6.0 - 12.0 fL Final    Platelets 09/10/2024 130 (L)  140 - 450 10*3/mm3 Final    Neutrophil % 09/10/2024 47.9  42.7 - 76.0 % Final    Lymphocyte % 09/10/2024 40.5  19.6 - 45.3 % Final    Monocyte % 09/10/2024 9.1  5.0 - 12.0 % Final    Eosinophil % 09/10/2024 2.1  0.3 - 6.2 % Final    Basophil % 09/10/2024 0.2  0.0 - 1.5 % Final    Immature Grans % 09/10/2024 0.2  0.0 - 0.5 % Final    Neutrophils, Absolute 09/10/2024 2.32  1.70 - 7.00 10*3/mm3 Final     Lymphocytes, Absolute 09/10/2024 1.96  0.70 - 3.10 10*3/mm3 Final    Monocytes, Absolute 09/10/2024 0.44  0.10 - 0.90 10*3/mm3 Final    Eosinophils, Absolute 09/10/2024 0.10  0.00 - 0.40 10*3/mm3 Final    Basophils, Absolute 09/10/2024 0.01  0.00 - 0.20 10*3/mm3 Final    Immature Grans, Absolute 09/10/2024 0.01  0.00 - 0.05 10*3/mm3 Final    nRBC 09/10/2024 0.0  0.0 - 0.2 /100 WBC Final        Condition on Discharge:  improved    Vital Signs  Temp:  [97 °F (36.1 °C)-97.4 °F (36.3 °C)] 97 °F (36.1 °C)  Heart Rate:  [64-85] 85  Resp:  [16-18] 18  BP: (125-144)/(64-70) 125/64      Discharge Disposition:  Rehab Facility or Unit (DC - External)    Discharge Medications:     Discharge Medications        Continue These Medications        Instructions Start Date   famotidine 20 MG tablet  Commonly known as: PEPCID   20 mg, Oral, 2 Times Daily PRN      HumaLOG KwikPen 100 UNIT/ML solution pen-injector  Generic drug: Insulin Lispro (1 Unit Dial)   0-25 Units, Subcutaneous, 4 Times Daily With Meals & Nightly      hydrOXYzine 25 MG tablet  Commonly known as: ATARAX   25 mg, Oral, Every 4 Hours PRN      lamoTRIgine 25 MG tablet  Commonly known as: LaMICtal   Take 1 tablet by mouth Daily for 14 days, THEN 2 tablets Daily for 14 days. Indications: Manic-Depression   Start Date: September 24, 2024     Lantus SoloStar 100 UNIT/ML injection pen  Generic drug: Insulin Glargine   35 Units, Subcutaneous, 2 Times Daily      Lurasidone HCl 20 MG tablet tablet  Commonly known as: LATUDA   20 mg, Oral, Daily With Dinner      metFORMIN 500 MG tablet  Commonly known as: GLUCOPHAGE   500 mg, Oral, 2 Times Daily With Meals      traZODone 50 MG tablet  Commonly known as: DESYREL   50 mg, Oral, Nightly               Discharge Diet: Consistent carbohydrate diet     Activity at Discharge: As tolerated     Follow-up Appointments  Future Appointments   Date Time Provider Department Center   10/21/2024  3:15 PM PAT 3 COR BH COR PAT COR     [facility]        Time: I spent  >30  minutes on this discharge activity which included: face-to-face encounter with the patient, reviewing the data in the system, coordination of the care with the nursing staff as well as consultants, documentation, and entering orders.        Clinician:   Ashley Thomas MD  10/07/24  09:39 EDT

## 2024-10-07 NOTE — PROGRESS NOTES
1015:     Spoke with Nathan at [facility]. She reports that the patient can come today, but does not have a  until 2 p.m. Patient requested to discharge ASAP. Nathan was agreeable to therapist scheduling [facility] and sending a statement that patient had been accepted and has a bed at [facility] location. No other issues identified this date. Therapist has faxed discharge release and statement of bed location.

## 2024-10-07 NOTE — NURSING NOTE
Careful discussion with patient about discharge. No objective or reported signs of SI or acute distress, or self harm. Safety plan discussed, patient contracted. Advised pt thought may return or may become worse. Crisis number provided. Advised if change of condition or worsening of symptoms return to ed and/or seek outpt services.

## 2024-10-07 NOTE — PLAN OF CARE
Goal Outcome Evaluation:  Plan of Care Reviewed With: patient  Patient Agreement with Plan of Care: agrees     Progress: improving  Outcome Evaluation: Patient denies any issues at time of assessment Still SP1 with sitter He did become agitated somewhat with staff before going to bed It was attention seeking behavior No acute distress noted will continue to monitor

## 2024-10-07 NOTE — PROGRESS NOTES
"1340:     Therapist has contacted [facility] through out the day to check on ride.  Patient was initially scheduled for 1030 , but staff there had not noticed his hospital was release was faxed. They acknowledged that they had received it around 1130 and stated patient would be added to the schedule.  Therapist called again at 1345 to see why patient had not been picked up. Was informed, \"patient accidentally was not added to the 's schedule. Will get him added now but can not give you a ETA.\"   "

## 2024-10-07 NOTE — PROGRESS NOTES
1023    Patient signed consent for his grandmother Bella at [phone];  Therapist provided clinical and support.  Provided information regarding patient discharging to [facility] sober living in [facility].  Bella verbalized understanding.  No other issues identified. Bella reports that patient has a consistent history of being defiant and not complying with his diet. He also often seeks attention which also lands him in trouble.   She reports that patient needs a group home. At his time he does not have income for personal care home referral. Patient is his own guardian at this time and requests to be referred to [facility].  Bella verbalized understanding.     Therapist has educated patient about seriousness of his diabetes diagnosed and complying with his diet.  Patient is able to verbalized understanding and the significant health risks if he does not. Patient reports that he knows how to administer his own medications and that he will provide his discharge papers to staff at [facility] once he arrives.  He reports that he will comply and that he will also comply with his surgery in late October.

## 2024-10-07 NOTE — ED PROVIDER NOTES
Subjective   Patient seen in intake for complaint of suicidal ideations.  He was recently discharged and his plan was to scratch himself to death.  States that he tried to injure himself today by scratching himself.  He also has dose ideations to cut his wrist.  Denied any hallucinations.  No homicidal ideations.  Depression and anxiety continue rated as a 10 out of 10    Review of Systems    Past Medical History:   Diagnosis Date    Anxiety     Bipolar disorder     Depression     Diabetes     Gallstones     HTN (hypertension)     Self-injurious behavior     Suicidal behavior        No Known Allergies    Past Surgical History:   Procedure Laterality Date    EAR TUBES         History reviewed. No pertinent family history.    Social History     Socioeconomic History    Marital status: Single    Years of education: 12    Highest education level: High school graduate   Tobacco Use    Smoking status: Never    Smokeless tobacco: Never   Vaping Use    Vaping status: Former    Substances: Nicotine    Devices: Disposable   Substance and Sexual Activity    Alcohol use: Not Currently    Drug use: Never    Sexual activity: Not Currently           Objective   Physical Exam  Vitals and nursing note reviewed. Exam conducted with a chaperone present.   Constitutional:       General: He is not in acute distress.     Appearance: Normal appearance. He is not ill-appearing, toxic-appearing or diaphoretic.   HENT:      Head: Normocephalic and atraumatic.      Nose: Nose normal.      Mouth/Throat:      Pharynx: No oropharyngeal exudate or posterior oropharyngeal erythema.   Eyes:      Extraocular Movements: Extraocular movements intact.      Conjunctiva/sclera: Conjunctivae normal.      Pupils: Pupils are equal, round, and reactive to light.   Cardiovascular:      Rate and Rhythm: Normal rate and regular rhythm.   Pulmonary:      Effort: Pulmonary effort is normal. No respiratory distress.      Breath sounds: Normal breath sounds. No  stridor. No wheezing, rhonchi or rales.   Abdominal:      General: Abdomen is flat. There is no distension.      Tenderness: There is no abdominal tenderness. There is no guarding or rebound.   Musculoskeletal:         General: No swelling, tenderness, deformity or signs of injury. Normal range of motion.      Cervical back: Normal range of motion.   Skin:     General: Skin is warm and dry.      Capillary Refill: Capillary refill takes less than 2 seconds.      Findings: No erythema or rash.   Neurological:      General: No focal deficit present.      Mental Status: He is alert and oriented to person, place, and time. Mental status is at baseline.      Cranial Nerves: No cranial nerve deficit.      Sensory: No sensory deficit.      Motor: No weakness.   Psychiatric:         Mood and Affect: Mood normal.         Procedures           ED Course                                             Medical Decision Making  Patient seen here for suicidal ideations and evaluated by the psychiatrist who is agreeable to admit the patient for continued suicidal ideations for his second visit today    Problems Addressed:  Suicidal ideation: complicated acute illness or injury    Amount and/or Complexity of Data Reviewed  Labs: ordered.        Final diagnoses:   Suicidal ideation       ED Disposition  ED Disposition       ED Disposition   DC/Transfer to Behavioral Health Condition   Stable    Comment   --               No follow-up provider specified.       Medication List      No changes were made to your prescriptions during this visit.            Von Levi MD  10/07/24 0066

## 2024-10-08 VITALS
SYSTOLIC BLOOD PRESSURE: 129 MMHG | HEART RATE: 85 BPM | RESPIRATION RATE: 16 BRPM | DIASTOLIC BLOOD PRESSURE: 85 MMHG | BODY MASS INDEX: 46.21 KG/M2 | OXYGEN SATURATION: 99 % | WEIGHT: 312 LBS | HEIGHT: 69 IN | TEMPERATURE: 98.7 F

## 2024-10-08 LAB
ALBUMIN SERPL-MCNC: 4 G/DL (ref 3.5–5.2)
ALBUMIN/GLOB SERPL: 1.3 G/DL
ALP SERPL-CCNC: 120 U/L (ref 39–117)
ALT SERPL W P-5'-P-CCNC: 50 U/L (ref 1–41)
AMPHET+METHAMPHET UR QL: NEGATIVE
AMPHETAMINES UR QL: NEGATIVE
ANION GAP SERPL CALCULATED.3IONS-SCNC: 9.1 MMOL/L (ref 5–15)
AST SERPL-CCNC: 40 U/L (ref 1–40)
BARBITURATES UR QL SCN: NEGATIVE
BASOPHILS # BLD AUTO: 0.01 10*3/MM3 (ref 0–0.2)
BASOPHILS NFR BLD AUTO: 0.2 % (ref 0–1.5)
BENZODIAZ UR QL SCN: NEGATIVE
BILIRUB SERPL-MCNC: 1.2 MG/DL (ref 0–1.2)
BILIRUB UR QL STRIP: NEGATIVE
BUN SERPL-MCNC: 14 MG/DL (ref 6–20)
BUN/CREAT SERPL: 22.6 (ref 7–25)
BUPRENORPHINE SERPL-MCNC: NEGATIVE NG/ML
CALCIUM SPEC-SCNC: 8.9 MG/DL (ref 8.6–10.5)
CANNABINOIDS SERPL QL: NEGATIVE
CHLORIDE SERPL-SCNC: 109 MMOL/L (ref 98–107)
CLARITY UR: ABNORMAL
CO2 SERPL-SCNC: 22.9 MMOL/L (ref 22–29)
COCAINE UR QL: NEGATIVE
COLOR UR: YELLOW
CREAT SERPL-MCNC: 0.62 MG/DL (ref 0.76–1.27)
DEPRECATED RDW RBC AUTO: 42.7 FL (ref 37–54)
EGFRCR SERPLBLD CKD-EPI 2021: 139.5 ML/MIN/1.73
EOSINOPHIL # BLD AUTO: 0.1 10*3/MM3 (ref 0–0.4)
EOSINOPHIL NFR BLD AUTO: 2.3 % (ref 0.3–6.2)
ERYTHROCYTE [DISTWIDTH] IN BLOOD BY AUTOMATED COUNT: 12.8 % (ref 12.3–15.4)
ETHANOL BLD-MCNC: <10 MG/DL (ref 0–10)
ETHANOL UR QL: <0.01 %
FENTANYL UR-MCNC: NEGATIVE NG/ML
GLOBULIN UR ELPH-MCNC: 3.1 GM/DL
GLUCOSE SERPL-MCNC: 277 MG/DL (ref 65–99)
GLUCOSE UR STRIP-MCNC: ABNORMAL MG/DL
HCT VFR BLD AUTO: 39.1 % (ref 37.5–51)
HGB BLD-MCNC: 13.7 G/DL (ref 13–17.7)
HGB UR QL STRIP.AUTO: NEGATIVE
IMM GRANULOCYTES # BLD AUTO: 0.01 10*3/MM3 (ref 0–0.05)
IMM GRANULOCYTES NFR BLD AUTO: 0.2 % (ref 0–0.5)
KETONES UR QL STRIP: NEGATIVE
LEUKOCYTE ESTERASE UR QL STRIP.AUTO: NEGATIVE
LYMPHOCYTES # BLD AUTO: 1.88 10*3/MM3 (ref 0.7–3.1)
LYMPHOCYTES NFR BLD AUTO: 42.7 % (ref 19.6–45.3)
MAGNESIUM SERPL-MCNC: 1.9 MG/DL (ref 1.6–2.6)
MCH RBC QN AUTO: 32.4 PG (ref 26.6–33)
MCHC RBC AUTO-ENTMCNC: 35 G/DL (ref 31.5–35.7)
MCV RBC AUTO: 92.4 FL (ref 79–97)
METHADONE UR QL SCN: NEGATIVE
MONOCYTES # BLD AUTO: 0.54 10*3/MM3 (ref 0.1–0.9)
MONOCYTES NFR BLD AUTO: 12.3 % (ref 5–12)
NEUTROPHILS NFR BLD AUTO: 1.86 10*3/MM3 (ref 1.7–7)
NEUTROPHILS NFR BLD AUTO: 42.3 % (ref 42.7–76)
NITRITE UR QL STRIP: NEGATIVE
NRBC BLD AUTO-RTO: 0 /100 WBC (ref 0–0.2)
OPIATES UR QL: NEGATIVE
OXYCODONE UR QL SCN: NEGATIVE
PCP UR QL SCN: NEGATIVE
PH UR STRIP.AUTO: 7.5 [PH] (ref 5–8)
PLATELET # BLD AUTO: 115 10*3/MM3 (ref 140–450)
PMV BLD AUTO: 10 FL (ref 6–12)
POTASSIUM SERPL-SCNC: 3.9 MMOL/L (ref 3.5–5.2)
PROT SERPL-MCNC: 7.1 G/DL (ref 6–8.5)
PROT UR QL STRIP: NEGATIVE
RBC # BLD AUTO: 4.23 10*6/MM3 (ref 4.14–5.8)
SODIUM SERPL-SCNC: 141 MMOL/L (ref 136–145)
SP GR UR STRIP: 1.03 (ref 1–1.03)
TRICYCLICS UR QL SCN: NEGATIVE
UROBILINOGEN UR QL STRIP: ABNORMAL
WBC NRBC COR # BLD AUTO: 4.4 10*3/MM3 (ref 3.4–10.8)

## 2024-10-08 PROCEDURE — 82077 ASSAY SPEC XCP UR&BREATH IA: CPT | Performed by: STUDENT IN AN ORGANIZED HEALTH CARE EDUCATION/TRAINING PROGRAM

## 2024-10-08 PROCEDURE — 36415 COLL VENOUS BLD VENIPUNCTURE: CPT

## 2024-10-08 PROCEDURE — 85025 COMPLETE CBC W/AUTO DIFF WBC: CPT | Performed by: STUDENT IN AN ORGANIZED HEALTH CARE EDUCATION/TRAINING PROGRAM

## 2024-10-08 PROCEDURE — 83735 ASSAY OF MAGNESIUM: CPT | Performed by: STUDENT IN AN ORGANIZED HEALTH CARE EDUCATION/TRAINING PROGRAM

## 2024-10-08 PROCEDURE — 80053 COMPREHEN METABOLIC PANEL: CPT | Performed by: STUDENT IN AN ORGANIZED HEALTH CARE EDUCATION/TRAINING PROGRAM

## 2024-10-08 NOTE — NURSING NOTE
"Patient presented to intake after attempting to grab a knife to cut his wrist after settling into [facility], where he was discharged to after leaving the [facility] on 10/7. He reports that upon arrival to this facility he \"got in his feels,\" and decided that he felt suicidal again. Fortunately, he reports that police arrived in time to prevent him from cutting himself. Denied HI/AVH.     Patient was admitted to our facility on 10/3 for similar complaints, becoming upset for being held accountable for his behaviors and verbalizing suicidal ideations. While admitted here, he refused to adhere to dietary restrictions related to his diabetes, and also  became upset at peers after not being allowed to dictate what to watch on the TV in our day room. According to Dr. Thomas's note on day of discharge, \"The patient's behaviors appear to be driven more by his underlying personality disorder with antisocial traits .\"     Glucose 277  Creatinine 0.62  Platelets 115    UDS negative.    "

## 2024-10-08 NOTE — ED PROVIDER NOTES
Subjective     History provided by:  Patient  Mental Health Problem  Presenting symptoms: self-mutilation and suicidal thoughts    Patient accompanied by:  Law enforcement  Onset quality:  Sudden  Duration:  1 day  Timing:  Intermittent  Progression:  Waxing and waning  Chronicity:  Recurrent (discharged from [facility] today)  Treatment compliance:  Most of the time  Relieved by:  Nothing  Ineffective treatments:  Antidepressants and antipsychotics  Associated symptoms: anxiety and feelings of worthlessness    Associated symptoms: no abdominal pain and no chest pain    Risk factors: hx of mental illness        Review of Systems   Constitutional: Negative.  Negative for fever.   HENT: Negative.     Respiratory: Negative.     Cardiovascular: Negative.  Negative for chest pain.   Gastrointestinal: Negative.  Negative for abdominal pain.   Endocrine: Negative.    Genitourinary: Negative.  Negative for dysuria.   Skin: Negative.    Neurological: Negative.    Psychiatric/Behavioral:  Positive for self-injury and suicidal ideas. The patient is nervous/anxious.    All other systems reviewed and are negative.      Past Medical History:   Diagnosis Date    Anxiety     Bipolar disorder     Depression     Diabetes     Gallstones     HTN (hypertension)     Self-injurious behavior     Suicidal behavior        No Known Allergies    Past Surgical History:   Procedure Laterality Date    EAR TUBES         History reviewed. No pertinent family history.    Social History     Socioeconomic History    Marital status: Single    Years of education: 12    Highest education level: High school graduate   Tobacco Use    Smoking status: Never    Smokeless tobacco: Never   Vaping Use    Vaping status: Former    Substances: Nicotine    Devices: Disposable   Substance and Sexual Activity    Alcohol use: Not Currently    Drug use: Not Currently    Sexual activity: Not Currently           Objective   Physical Exam  Vitals and nursing note reviewed.    Constitutional:       General: He is not in acute distress.     Appearance: He is well-developed. He is not diaphoretic.   HENT:      Head: Normocephalic and atraumatic.      Right Ear: External ear normal.      Left Ear: External ear normal.      Nose: Nose normal.   Eyes:      Conjunctiva/sclera: Conjunctivae normal.   Neck:      Vascular: No JVD.      Trachea: No tracheal deviation.   Cardiovascular:      Rate and Rhythm: Normal rate.      Heart sounds: No murmur heard.  Pulmonary:      Effort: Pulmonary effort is normal. No respiratory distress.      Breath sounds: No wheezing.   Abdominal:      Palpations: Abdomen is soft.      Tenderness: There is no abdominal tenderness.   Musculoskeletal:         General: No deformity. Normal range of motion.      Cervical back: Normal range of motion and neck supple.   Skin:     General: Skin is warm and dry.      Coloration: Skin is not pale.      Findings: No erythema or rash.   Neurological:      Mental Status: He is alert and oriented to person, place, and time.      Cranial Nerves: No cranial nerve deficit.   Psychiatric:      Comments: Realize he was discharged from [facility] today.  Went to [facility].  [place] thoughts of suicide took his prescribed medication did not think it was working law enforcement brought him to the ER for mental health assessment.  Patient does have history of self-mutilation.         Procedures           ED Course  ED Course as of 10/08/24 0749   Tue Oct 08, 2024   0208 Patient be set up to transfer to the outside inpatient behavioral health facility. [RB]      ED Course User Index  [RB] Zane Santamaria II, PA                                             Medical Decision Making  Problems Addressed:  Suicidal ideation: acute illness or injury        Final diagnoses:   Suicidal ideation       ED Disposition  ED Disposition       ED Disposition   DC/Transfer to Behavioral Health    Condition   Stable    Comment   --                No follow-up provider specified.       Medication List      No changes were made to your prescriptions during this visit.            Zane Santamaria II, PA  10/08/24 0712

## 2024-10-08 NOTE — NURSING NOTE
Spoke to Dr. Galindo, discussed assessment and labs, patient does meet admission criteria but due to bed availability, transfer the patient to another facility. [id number]

## 2024-10-08 NOTE — PAYOR COMM NOTE
"Ramiro Drake (19 y.o. Male)       Date of Birth   2005    Social Security Number       Address   [address] [facility] [address]    Home Phone   [phone]    MRN   [id number]       Scientologist   Amish    Marital Status   Single                            Admission Date   10/3/24    Admission Type   Emergency    Admitting Provider   Antonia Miramontes MD    Attending Provider       Department, Room/Bed   [facility] ADULT PSYCHIATRIC, 1019/1S       Discharge Date   10/7/2024    Discharge Disposition   Rehab Facility or Unit (DC - External)    Discharge Destination                                 Attending Provider: (none)   Allergies: No Known Allergies    Isolation: None   Infection: None   Code Status: Prior    Ht: 175.3 cm (69\")   Wt: 142 kg (312 lb 6.4 oz)    Admission Cmt: None   Principal Problem: Suicidal ideations [R45.851]                   Active Insurance as of 10/3/2024       Primary Coverage       Payor Plan Insurance Group Employer/Plan Group    [employer] [place] [employer] [place]       Payor Plan Address Payor Plan Phone Number Payor Plan Fax Number Effective Dates    [address] [phone]  4/1/2024 - None Entered    [facility] [address]         Subscriber Name Subscriber Birth Date Member ID       RAMIRO DRAKE 2005 [id number]               Secondary Coverage       Payor Plan Insurance Group Employer/Plan Group    [place] [id number]       Payor Plan Address Payor Plan Phone Number Payor Plan Fax Number Effective Dates    [employer] [address] [address] [phone]  9/1/2024 - None Entered    [facility] [id number]         Subscriber Name Subscriber Birth Date Member ID       RAMIRO DRAKE 2005 [id number]                     Emergency Contacts        (Rel.) Home Phone Work Phone Mobile Phone    keyshawn drake (Grandparent) [phone] -- --                 Discharge Summary "        Ashley Thomas MD at 10/07/24 0939          :  2005  MRN:  [id number]  Visit Number:  [id number]      Date of Admission:10/3/2024   Date of Discharge:  10/7/2024    Discharge Diagnosis:  Principal Problem:    Suicidal ideations  Active Problems:    Bipolar II disorder    Alcohol use disorder, severe, dependence    DM (diabetes mellitus), type 2        Admission Diagnosis:  Suicidal ideations [R45.851]     HPI  Ajay Xie is a 19 y.o. male who was admitted on 10/3/2024 with complaints of worsening depression and SI.   For details please see H&P dated 10/4/24.     Hospital Course  Patient is a 19 y.o. male presented with depression and suicidal ideations. The patient got upset at the rehab facility for being held accountable for his behaviors and verbalized suicidal ideations and was brought to the hospital. The patient was admitted to the adult psych unit for safety, further evaluation and treatment.  He reported that he got upset because he was not following the rules and although he was explained all the rules when he went to the rehab he deliberately chose not to follow them.  The patient's behaviors appear to be driven more by his underlying personality disorder with antisocial traits.  He got upset with the peers in the day room when he did not get to dictate what channel to watch on TV.  He went into his room and tried to use a comb to scratch his wrist.  He was then put on SP1.  Another reason for SP1 was that patient refused to adhere to his dietary restrictions for his diabetes and when the snacks were taken away he would take food from other patients place.  After patient was put on SP1 with constant one-to-one monitoring he expressed remorse for his behaviors and wanted to go to rehab.  The patient was accepted to the [facility] for rehab treatment.  He verbalized that he had learned his lesson and that he would try to follow the rules and work on his sobriety.  The patient was  "continued on his home medications and was also able to participate in individual and group counseling sessions.    Mental Status Exam upon discharge:   Mood \"good\"   Affect-congruent, appropriate, stable  Thought Content-goal directed, no delusional material present  Thought process-linear, organized.  Suicidality: No SI  Homicidality: No HI  Perception: No AH/VH    Procedures Performed         Consults:   Consults       No orders found from 9/4/2024 to 10/4/2024.            Pertinent Test Results:   Admission on 10/03/2024   Component Date Value Ref Range Status    Hepatitis B Surface Ag 10/03/2024 Non-Reactive  Non-Reactive Final    Hep A IgM 10/03/2024 Non-Reactive  Non-Reactive Final    Hep B C IgM 10/03/2024 Non-Reactive  Non-Reactive Final    Hepatitis C Ab 10/03/2024 Non-Reactive  Non-Reactive Final    Glucose 10/04/2024 313 (H)  70 - 130 mg/dL Final    Glucose 10/04/2024 431 (C)  70 - 130 mg/dL Final    Glucose 10/04/2024 319 (H)  70 - 130 mg/dL Final    Glucose 10/04/2024 424 (C)  70 - 130 mg/dL Final    Glucose 10/04/2024 400 (H)  70 - 130 mg/dL Final    Glucose 10/04/2024 309 (H)  70 - 130 mg/dL Final    Glucose 10/04/2024 232 (H)  70 - 130 mg/dL Final    Glucose 10/05/2024 505 (C)  70 - 130 mg/dL Final    Glucose 10/05/2024 472 (C)  70 - 130 mg/dL Final    Glucose 10/05/2024 331 (H)  70 - 130 mg/dL Final    Glucose 10/05/2024 284 (H)  70 - 130 mg/dL Final    Glucose 10/05/2024 261 (H)  70 - 130 mg/dL Final    Glucose 10/06/2024 284 (H)  70 - 130 mg/dL Final    Glucose 10/06/2024 349 (H)  70 - 130 mg/dL Final    Glucose 10/06/2024 312 (H)  70 - 130 mg/dL Final    Glucose 10/06/2024 367 (H)  70 - 130 mg/dL Final    Glucose 10/07/2024 273 (H)  70 - 130 mg/dL Final   Admission on 10/03/2024, Discharged on 10/03/2024   Component Date Value Ref Range Status    Glucose 10/03/2024 344 (H)  70 - 130 mg/dL Final    QT Interval 10/03/2024 342  ms Final    QTC Interval 10/03/2024 452  ms Final    Glucose 10/03/2024 " 378 (H)  65 - 99 mg/dL Final    BUN 10/03/2024 16  6 - 20 mg/dL Final    Creatinine 10/03/2024 0.68 (L)  0.76 - 1.27 mg/dL Final    Sodium 10/03/2024 138  136 - 145 mmol/L Final    Potassium 10/03/2024 4.4  3.5 - 5.2 mmol/L Final    Specimen hemolyzed.  Result may be falsely elevated.  4+ lipemia    Chloride 10/03/2024 104  98 - 107 mmol/L Final    CO2 10/03/2024 19.3 (L)  22.0 - 29.0 mmol/L Final    Calcium 10/03/2024 9.4  8.6 - 10.5 mg/dL Final    Total Protein 10/03/2024 7.2  6.0 - 8.5 g/dL Final    Albumin 10/03/2024 3.9  3.5 - 5.2 g/dL Final    ALT (SGPT) 10/03/2024 7  1 - 41 U/L Final    Specimen hemolyzed.  Result may  be falsely elevated.  4+ lipemia    AST (SGOT) 10/03/2024 36  1 - 40 U/L Final    Alkaline Phosphatase 10/03/2024 133 (H)  39 - 117 U/L Final    Total Bilirubin 10/03/2024 0.6  0.0 - 1.2 mg/dL Final    Globulin 10/03/2024 3.3  gm/dL Final    A/G Ratio 10/03/2024 1.2  g/dL Final    BUN/Creatinine Ratio 10/03/2024 23.5  7.0 - 25.0 Final    Anion Gap 10/03/2024 14.7  5.0 - 15.0 mmol/L Final    eGFR 10/03/2024 135.6  >60.0 mL/min/1.73 Final    Color, UA 10/03/2024 Yellow  Yellow, Straw Final    Appearance, UA 10/03/2024 Clear  Clear Final    pH, UA 10/03/2024 5.5  5.0 - 8.0 Final    Specific Gravity, UA 10/03/2024 >1.030 (H)  1.005 - 1.030 Final    Glucose, UA 10/03/2024 >=1000 mg/dL (3+) (A)  Negative Final    Ketones, UA 10/03/2024 Trace (A)  Negative Final    Bilirubin, UA 10/03/2024 Negative  Negative Final    Blood, UA 10/03/2024 Negative  Negative Final    Protein, UA 10/03/2024 Negative  Negative Final    Leuk Esterase, UA 10/03/2024 Negative  Negative Final    Nitrite, UA 10/03/2024 Negative  Negative Final    Urobilinogen, UA 10/03/2024 1.0 E.U./dL  0.2 - 1.0 E.U./dL Final    Acetone 10/03/2024 Negative  Negative Final    THC, Screen, Urine 10/03/2024 Negative  Negative Final    Phencyclidine (PCP), Urine 10/03/2024 Negative  Negative Final    Cocaine Screen, Urine 10/03/2024 Negative   Negative Final    Methamphetamine, Ur 10/03/2024 Negative  Negative Final    Opiate Screen 10/03/2024 Negative  Negative Final    Amphetamine Screen, Urine 10/03/2024 Negative  Negative Final    Benzodiazepine Screen, Urine 10/03/2024 Negative  Negative Final    Tricyclic Antidepressants Screen 10/03/2024 Negative  Negative Final    Methadone Screen, Urine 10/03/2024 Negative  Negative Final    Barbiturates Screen, Urine 10/03/2024 Negative  Negative Final    Oxycodone Screen, Urine 10/03/2024 Negative  Negative Final    Buprenorphine, Screen, Urine 10/03/2024 Negative  Negative Final    Extra Tube 10/03/2024 Hold for add-ons.   Final    Auto resulted.    Extra Tube 10/03/2024 hold for add-on   Final    Auto resulted    Extra Tube 10/03/2024 Hold for add-ons.   Final    Auto resulted.    Extra Tube 10/03/2024 Hold for add-ons.   Final    Auto resulted    WBC 10/03/2024 4.21  3.40 - 10.80 10*3/mm3 Final    RBC 10/03/2024 4.20  4.14 - 5.80 10*6/mm3 Final    Hemoglobin 10/03/2024 14.4  13.0 - 17.7 g/dL Final    Hematocrit 10/03/2024 38.8  37.5 - 51.0 % Final    MCV 10/03/2024 92.4  79.0 - 97.0 fL Final    MCH 10/03/2024 34.3 (H)  26.6 - 33.0 pg Final    MCHC 10/03/2024 37.1 (H)  31.5 - 35.7 g/dL Final    RDW 10/03/2024 13.0  12.3 - 15.4 % Final    RDW-SD 10/03/2024 43.9  37.0 - 54.0 fl Final    MPV 10/03/2024 10.5  6.0 - 12.0 fL Final    Platelets 10/03/2024 133 (L)  140 - 450 10*3/mm3 Final    Neutrophil % 10/03/2024 54.5  42.7 - 76.0 % Final    Lymphocyte % 10/03/2024 31.4  19.6 - 45.3 % Final    Monocyte % 10/03/2024 10.5  5.0 - 12.0 % Final    Eosinophil % 10/03/2024 2.9  0.3 - 6.2 % Final    Basophil % 10/03/2024 0.5  0.0 - 1.5 % Final    Immature Grans % 10/03/2024 0.2  0.0 - 0.5 % Final    Neutrophils, Absolute 10/03/2024 2.30  1.70 - 7.00 10*3/mm3 Final    Lymphocytes, Absolute 10/03/2024 1.32  0.70 - 3.10 10*3/mm3 Final    Monocytes, Absolute 10/03/2024 0.44  0.10 - 0.90 10*3/mm3 Final    Eosinophils, Absolute  10/03/2024 0.12  0.00 - 0.40 10*3/mm3 Final    Basophils, Absolute 10/03/2024 0.02  0.00 - 0.20 10*3/mm3 Final    Immature Grans, Absolute 10/03/2024 0.01  0.00 - 0.05 10*3/mm3 Final    nRBC 10/03/2024 0.0  0.0 - 0.2 /100 WBC Final    Anisocytosis 10/03/2024 Slight/1+  None Seen Final    Platelet Morphology 10/03/2024 Normal  Normal Final    Fentanyl, Urine 10/03/2024 Negative  Negative Final    Glucose 10/03/2024 349 (H)  70 - 130 mg/dL Final    Ethanol 10/03/2024 <10  0 - 10 mg/dL Final    Ethanol % 10/03/2024 <0.010  % Final    Magnesium 10/03/2024 1.6  1.6 - 2.6 mg/dL Final    Glucose 10/03/2024 298 (H)  70 - 130 mg/dL Final    QT Interval 10/03/2024 370  ms Final    QTC Interval 10/03/2024 440  ms Final   Admission on 09/25/2024, Discharged on 09/30/2024   Component Date Value Ref Range Status    Hepatitis B Surface Ag 09/25/2024 Non-Reactive  Non-Reactive Final    Hep A IgM 09/25/2024 Non-Reactive  Non-Reactive Final    Hep B C IgM 09/25/2024 Non-Reactive  Non-Reactive Final    Hepatitis C Ab 09/25/2024 Non-Reactive  Non-Reactive Final    QT Interval 09/26/2024 414  ms Final    QTC Interval 09/26/2024 453  ms Final    Glucose 09/26/2024 276 (H)  70 - 130 mg/dL Final    Glucose 09/26/2024 299 (H)  70 - 130 mg/dL Final    Glucose 09/26/2024 321 (H)  70 - 130 mg/dL Final    Glucose 09/26/2024 299 (H)  70 - 130 mg/dL Final    Glucose 09/27/2024 362 (H)  70 - 130 mg/dL Final    Glucose 09/27/2024 514 (C)  70 - 130 mg/dL Final    Glucose 09/27/2024 436 (C)  70 - 130 mg/dL Final    Glucose 09/27/2024 421 (C)  70 - 130 mg/dL Final    Glucose 09/27/2024 287 (H)  70 - 130 mg/dL Final    Glucose 09/27/2024 273 (H)  70 - 130 mg/dL Final    Glucose 09/27/2024 299 (H)  70 - 130 mg/dL Final    Glucose 09/28/2024 293 (H)  70 - 130 mg/dL Final    Glucose 09/28/2024 326 (H)  70 - 130 mg/dL Final    Glucose 09/28/2024 191 (H)  70 - 130 mg/dL Final    Glucose 09/28/2024 282 (H)  70 - 130 mg/dL Final    Glucose 09/29/2024 383 (H)   70 - 130 mg/dL Final    Glucose 09/29/2024 356 (H)  70 - 130 mg/dL Final    Glucose 09/29/2024 338 (H)  70 - 130 mg/dL Final    Glucose 09/29/2024 310 (H)  70 - 130 mg/dL Final    Glucose 09/29/2024 350 (H)  70 - 130 mg/dL Final    Glucose 09/30/2024 223 (H)  70 - 130 mg/dL Final    Glucose 09/30/2024 366 (H)  70 - 130 mg/dL Final   Admission on 09/25/2024, Discharged on 09/25/2024   Component Date Value Ref Range Status    Glucose 09/25/2024 383 (H)  65 - 99 mg/dL Final    BUN 09/25/2024 11  6 - 20 mg/dL Final    Creatinine 09/25/2024 0.70 (L)  0.76 - 1.27 mg/dL Final    Sodium 09/25/2024 136  136 - 145 mmol/L Final    Potassium 09/25/2024 4.1  3.5 - 5.2 mmol/L Final    Slight hemolysis detected by analyzer. Result may be falsely elevated.    Chloride 09/25/2024 103  98 - 107 mmol/L Final    CO2 09/25/2024 21.2 (L)  22.0 - 29.0 mmol/L Final    Calcium 09/25/2024 9.4  8.6 - 10.5 mg/dL Final    Total Protein 09/25/2024 7.7  6.0 - 8.5 g/dL Final    Albumin 09/25/2024 4.3  3.5 - 5.2 g/dL Final    ALT (SGPT) 09/25/2024 48 (H)  1 - 41 U/L Final    AST (SGOT) 09/25/2024 38  1 - 40 U/L Final    Alkaline Phosphatase 09/25/2024 135 (H)  39 - 117 U/L Final    Total Bilirubin 09/25/2024 0.8  0.0 - 1.2 mg/dL Final    Globulin 09/25/2024 3.4  gm/dL Final    A/G Ratio 09/25/2024 1.3  g/dL Final    BUN/Creatinine Ratio 09/25/2024 15.7  7.0 - 25.0 Final    Anion Gap 09/25/2024 11.8  5.0 - 15.0 mmol/L Final    eGFR 09/25/2024 134.4  >60.0 mL/min/1.73 Final    Color, UA 09/25/2024 Yellow  Yellow, Straw Final    Appearance, UA 09/25/2024 Clear  Clear Final    pH, UA 09/25/2024 7.0  5.0 - 8.0 Final    Specific Gravity, UA 09/25/2024 >1.030 (H)  1.005 - 1.030 Final    Glucose, UA 09/25/2024 >=1000 mg/dL (3+) (A)  Negative Final    Ketones, UA 09/25/2024 Negative  Negative Final    Bilirubin, UA 09/25/2024 Negative  Negative Final    Blood, UA 09/25/2024 Negative  Negative Final    Protein, UA 09/25/2024 Trace (A)  Negative Final    Leuk  Esterase, UA 09/25/2024 Negative  Negative Final    Nitrite, UA 09/25/2024 Negative  Negative Final    Urobilinogen, UA 09/25/2024 1.0 E.U./dL  0.2 - 1.0 E.U./dL Final    THC, Screen, Urine 09/25/2024 Negative  Negative Final    Phencyclidine (PCP), Urine 09/25/2024 Negative  Negative Final    Cocaine Screen, Urine 09/25/2024 Negative  Negative Final    Methamphetamine, Ur 09/25/2024 Negative  Negative Final    Opiate Screen 09/25/2024 Negative  Negative Final    Amphetamine Screen, Urine 09/25/2024 Negative  Negative Final    Benzodiazepine Screen, Urine 09/25/2024 Negative  Negative Final    Tricyclic Antidepressants Screen 09/25/2024 Negative  Negative Final    Methadone Screen, Urine 09/25/2024 Negative  Negative Final    Barbiturates Screen, Urine 09/25/2024 Negative  Negative Final    Oxycodone Screen, Urine 09/25/2024 Negative  Negative Final    Buprenorphine, Screen, Urine 09/25/2024 Negative  Negative Final    Magnesium 09/25/2024 2.0  1.6 - 2.6 mg/dL Final    Ethanol 09/25/2024 <10  0 - 10 mg/dL Final    Ethanol % 09/25/2024 <0.010  % Final    WBC 09/25/2024 4.34  3.40 - 10.80 10*3/mm3 Final    RBC 09/25/2024 4.38  4.14 - 5.80 10*6/mm3 Final    Hemoglobin 09/25/2024 14.1  13.0 - 17.7 g/dL Final    Hematocrit 09/25/2024 39.8  37.5 - 51.0 % Final    MCV 09/25/2024 90.9  79.0 - 97.0 fL Final    MCH 09/25/2024 32.2  26.6 - 33.0 pg Final    MCHC 09/25/2024 35.4  31.5 - 35.7 g/dL Final    RDW 09/25/2024 12.8  12.3 - 15.4 % Final    RDW-SD 09/25/2024 42.1  37.0 - 54.0 fl Final    MPV 09/25/2024 10.0  6.0 - 12.0 fL Final    Platelets 09/25/2024 112 (L)  140 - 450 10*3/mm3 Final    Neutrophil % 09/25/2024 46.5  42.7 - 76.0 % Final    Lymphocyte % 09/25/2024 40.6  19.6 - 45.3 % Final    Monocyte % 09/25/2024 10.4  5.0 - 12.0 % Final    Eosinophil % 09/25/2024 1.8  0.3 - 6.2 % Final    Basophil % 09/25/2024 0.2  0.0 - 1.5 % Final    Immature Grans % 09/25/2024 0.5  0.0 - 0.5 % Final    Neutrophils, Absolute  09/25/2024 2.02  1.70 - 7.00 10*3/mm3 Final    Lymphocytes, Absolute 09/25/2024 1.76  0.70 - 3.10 10*3/mm3 Final    Monocytes, Absolute 09/25/2024 0.45  0.10 - 0.90 10*3/mm3 Final    Eosinophils, Absolute 09/25/2024 0.08  0.00 - 0.40 10*3/mm3 Final    Basophils, Absolute 09/25/2024 0.01  0.00 - 0.20 10*3/mm3 Final    Immature Grans, Absolute 09/25/2024 0.02  0.00 - 0.05 10*3/mm3 Final    nRBC 09/25/2024 0.0  0.0 - 0.2 /100 WBC Final    Fentanyl, Urine 09/25/2024 Negative  Negative Final   Admission on 09/25/2024, Discharged on 09/25/2024   Component Date Value Ref Range Status    Glucose 09/25/2024 463 (C)  65 - 99 mg/dL Final    BUN 09/25/2024 11  6 - 20 mg/dL Final    Creatinine 09/25/2024 0.78  0.76 - 1.27 mg/dL Final    Sodium 09/25/2024 135 (L)  136 - 145 mmol/L Final    Potassium 09/25/2024 4.4  3.5 - 5.2 mmol/L Final    Slight hemolysis detected by analyzer. Result may be falsely elevated.    Chloride 09/25/2024 103  98 - 107 mmol/L Final    CO2 09/25/2024 15.2 (L)  22.0 - 29.0 mmol/L Final    Calcium 09/25/2024 9.2  8.6 - 10.5 mg/dL Final    Total Protein 09/25/2024 7.4  6.0 - 8.5 g/dL Final    Albumin 09/25/2024 3.8  3.5 - 5.2 g/dL Final    ALT (SGPT) 09/25/2024 47 (H)  1 - 41 U/L Final    AST (SGOT) 09/25/2024 43 (H)  1 - 40 U/L Final    Alkaline Phosphatase 09/25/2024 142 (H)  39 - 117 U/L Final    Total Bilirubin 09/25/2024 0.9  0.0 - 1.2 mg/dL Final    Globulin 09/25/2024 3.6  gm/dL Final    A/G Ratio 09/25/2024 1.1  g/dL Final    BUN/Creatinine Ratio 09/25/2024 14.1  7.0 - 25.0 Final    Anion Gap 09/25/2024 16.8 (H)  5.0 - 15.0 mmol/L Final    eGFR 09/25/2024 130.1  >60.0 mL/min/1.73 Final    Color, UA 09/25/2024 Yellow  Yellow, Straw Final    Appearance, UA 09/25/2024 Clear  Clear Final    pH, UA 09/25/2024 6.5  5.0 - 8.0 Final    Specific Gravity, UA 09/25/2024 >1.030 (H)  1.005 - 1.030 Final    Glucose, UA 09/25/2024 >=1000 mg/dL (3+) (A)  Negative Final    Ketones, UA 09/25/2024 Negative  Negative  Final    Bilirubin, UA 09/25/2024 Negative  Negative Final    Blood, UA 09/25/2024 Negative  Negative Final    Protein, UA 09/25/2024 Negative  Negative Final    Leuk Esterase, UA 09/25/2024 Negative  Negative Final    Nitrite, UA 09/25/2024 Negative  Negative Final    Urobilinogen, UA 09/25/2024 0.2 E.U./dL  0.2 - 1.0 E.U./dL Final    THC, Screen, Urine 09/25/2024 Negative  Negative Final    Phencyclidine (PCP), Urine 09/25/2024 Negative  Negative Final    Cocaine Screen, Urine 09/25/2024 Negative  Negative Final    Methamphetamine, Ur 09/25/2024 Negative  Negative Final    Opiate Screen 09/25/2024 Negative  Negative Final    Amphetamine Screen, Urine 09/25/2024 Negative  Negative Final    Benzodiazepine Screen, Urine 09/25/2024 Negative  Negative Final    Tricyclic Antidepressants Screen 09/25/2024 Negative  Negative Final    Methadone Screen, Urine 09/25/2024 Negative  Negative Final    Barbiturates Screen, Urine 09/25/2024 Negative  Negative Final    Oxycodone Screen, Urine 09/25/2024 Negative  Negative Final    Buprenorphine, Screen, Urine 09/25/2024 Negative  Negative Final    Ethanol 09/25/2024 <10  0 - 10 mg/dL Final    Ethanol % 09/25/2024 <0.010  % Final    Magnesium 09/25/2024 2.0  1.6 - 2.6 mg/dL Final    WBC 09/25/2024 4.03  3.40 - 10.80 10*3/mm3 Final    RBC 09/25/2024 4.35  4.14 - 5.80 10*6/mm3 Final    Hemoglobin 09/25/2024 14.2  13.0 - 17.7 g/dL Final    Hematocrit 09/25/2024 41.0  37.5 - 51.0 % Final    MCV 09/25/2024 94.3  79.0 - 97.0 fL Final    MCH 09/25/2024 32.6  26.6 - 33.0 pg Final    MCHC 09/25/2024 34.6  31.5 - 35.7 g/dL Final    RDW 09/25/2024 12.7  12.3 - 15.4 % Final    RDW-SD 09/25/2024 43.8  37.0 - 54.0 fl Final    MPV 09/25/2024 10.6  6.0 - 12.0 fL Final    Platelets 09/25/2024 54 (L)  140 - 450 10*3/mm3 Final    Neutrophil % 09/25/2024 54.1  42.7 - 76.0 % Final    Lymphocyte % 09/25/2024 33.3  19.6 - 45.3 % Final    Monocyte % 09/25/2024 10.2  5.0 - 12.0 % Final    Eosinophil %  09/25/2024 2.0  0.3 - 6.2 % Final    Basophil % 09/25/2024 0.2  0.0 - 1.5 % Final    Immature Grans % 09/25/2024 0.2  0.0 - 0.5 % Final    Neutrophils, Absolute 09/25/2024 2.18  1.70 - 7.00 10*3/mm3 Final    Lymphocytes, Absolute 09/25/2024 1.34  0.70 - 3.10 10*3/mm3 Final    Monocytes, Absolute 09/25/2024 0.41  0.10 - 0.90 10*3/mm3 Final    Eosinophils, Absolute 09/25/2024 0.08  0.00 - 0.40 10*3/mm3 Final    Basophils, Absolute 09/25/2024 0.01  0.00 - 0.20 10*3/mm3 Final    Immature Grans, Absolute 09/25/2024 0.01  0.00 - 0.05 10*3/mm3 Final    nRBC 09/25/2024 0.0  0.0 - 0.2 /100 WBC Final    Fentanyl, Urine 09/25/2024 Negative  Negative Final    RBC Morphology 09/25/2024 Normal  Normal Final    Clumped Platelets 09/25/2024 Present  None Seen Final    Acetone 09/25/2024 Negative  Negative Final   Admission on 09/18/2024, Discharged on 09/23/2024   Component Date Value Ref Range Status    Hepatitis B Surface Ag 09/18/2024 Non-Reactive  Non-Reactive Final    Hep A IgM 09/18/2024 Non-Reactive  Non-Reactive Final    Hep B C IgM 09/18/2024 Non-Reactive  Non-Reactive Final    Hepatitis C Ab 09/18/2024 Non-Reactive  Non-Reactive Final    QT Interval 09/18/2024 408  ms Final    QTC Interval 09/18/2024 443  ms Final    Glucose 09/18/2024 208 (H)  70 - 130 mg/dL Final    Glucose 09/18/2024 273 (H)  70 - 130 mg/dL Final    Glucose 09/18/2024 261 (H)  70 - 130 mg/dL Final    Glucose 09/18/2024 320 (H)  70 - 130 mg/dL Final    Glucose 09/19/2024 270 (H)  70 - 130 mg/dL Final    Glucose 09/19/2024 238 (H)  70 - 130 mg/dL Final    Glucose 09/19/2024 241 (H)  70 - 130 mg/dL Final    Glucose 09/19/2024 336 (H)  70 - 130 mg/dL Final    TSH 09/20/2024 1.470  0.270 - 4.200 uIU/mL Final    Glucose 09/20/2024 268 (H)  70 - 130 mg/dL Final    Glucose 09/20/2024 282 (H)  70 - 130 mg/dL Final    Glucose 09/20/2024 379 (H)  70 - 130 mg/dL Final    Glucose 09/20/2024 351 (H)  70 - 130 mg/dL Final    Glucose 09/21/2024 289 (H)  70 - 130 mg/dL  Final    Glucose 09/21/2024 300 (H)  70 - 130 mg/dL Final    Glucose 09/21/2024 394 (H)  70 - 130 mg/dL Final    Glucose 09/21/2024 375 (H)  70 - 130 mg/dL Final    Glucose 09/22/2024 275 (H)  70 - 130 mg/dL Final    Glucose 09/22/2024 434 (C)  70 - 130 mg/dL Final    Glucose 09/22/2024 284 (H)  70 - 130 mg/dL Final    Glucose 09/22/2024 314 (H)  70 - 130 mg/dL Final    Glucose 09/23/2024 213 (H)  70 - 130 mg/dL Final    Glucose 09/23/2024 237 (H)  70 - 130 mg/dL Final   Admission on 09/17/2024, Discharged on 09/18/2024   Component Date Value Ref Range Status    Glucose 09/17/2024 272 (H)  65 - 99 mg/dL Final    BUN 09/17/2024 13  6 - 20 mg/dL Final    Creatinine 09/17/2024 0.66 (L)  0.76 - 1.27 mg/dL Final    Sodium 09/17/2024 137  136 - 145 mmol/L Final    Potassium 09/17/2024 4.0  3.5 - 5.2 mmol/L Final    Slight hemolysis detected by analyzer. Result may be falsely elevated.    Chloride 09/17/2024 106  98 - 107 mmol/L Final    CO2 09/17/2024 21.7 (L)  22.0 - 29.0 mmol/L Final    Calcium 09/17/2024 9.4  8.6 - 10.5 mg/dL Final    Total Protein 09/17/2024 7.0  6.0 - 8.5 g/dL Final    Albumin 09/17/2024 4.0  3.5 - 5.2 g/dL Final    ALT (SGPT) 09/17/2024 34  1 - 41 U/L Final    AST (SGOT) 09/17/2024 35  1 - 40 U/L Final    Alkaline Phosphatase 09/17/2024 119 (H)  39 - 117 U/L Final    Total Bilirubin 09/17/2024 0.5  0.0 - 1.2 mg/dL Final    Globulin 09/17/2024 3.0  gm/dL Final    A/G Ratio 09/17/2024 1.3  g/dL Final    BUN/Creatinine Ratio 09/17/2024 19.7  7.0 - 25.0 Final    Anion Gap 09/17/2024 9.3  5.0 - 15.0 mmol/L Final    eGFR 09/17/2024 136.9  >60.0 mL/min/1.73 Final    Color, UA 09/17/2024 Yellow  Yellow, Straw Final    Appearance, UA 09/17/2024 Turbid (A)  Clear Final    pH, UA 09/17/2024 7.0  5.0 - 8.0 Final    Specific Gravity, UA 09/17/2024 1.026  1.005 - 1.030 Final    Glucose, UA 09/17/2024 >=1000 mg/dL (3+) (A)  Negative Final    Ketones, UA 09/17/2024 Negative  Negative Final    Bilirubin, UA  09/17/2024 Negative  Negative Final    Blood, UA 09/17/2024 Negative  Negative Final    Protein, UA 09/17/2024 Negative  Negative Final    Leuk Esterase, UA 09/17/2024 Negative  Negative Final    Nitrite, UA 09/17/2024 Negative  Negative Final    Urobilinogen, UA 09/17/2024 1.0 E.U./dL  0.2 - 1.0 E.U./dL Final    THC, Screen, Urine 09/17/2024 Negative  Negative Final    Phencyclidine (PCP), Urine 09/17/2024 Negative  Negative Final    Cocaine Screen, Urine 09/17/2024 Negative  Negative Final    Methamphetamine, Ur 09/17/2024 Negative  Negative Final    Opiate Screen 09/17/2024 Negative  Negative Final    Amphetamine Screen, Urine 09/17/2024 Negative  Negative Final    Benzodiazepine Screen, Urine 09/17/2024 Negative  Negative Final    Tricyclic Antidepressants Screen 09/17/2024 Negative  Negative Final    Methadone Screen, Urine 09/17/2024 Negative  Negative Final    Barbiturates Screen, Urine 09/17/2024 Negative  Negative Final    Oxycodone Screen, Urine 09/17/2024 Negative  Negative Final    Buprenorphine, Screen, Urine 09/17/2024 Negative  Negative Final    Magnesium 09/17/2024 1.7  1.6 - 2.6 mg/dL Final    Ethanol 09/17/2024 <10  0 - 10 mg/dL Final    Ethanol % 09/17/2024 <0.010  % Final    WBC 09/17/2024 3.63  3.40 - 10.80 10*3/mm3 Final    RBC 09/17/2024 4.00 (L)  4.14 - 5.80 10*6/mm3 Final    Hemoglobin 09/17/2024 12.9 (L)  13.0 - 17.7 g/dL Final    Hematocrit 09/17/2024 36.1 (L)  37.5 - 51.0 % Final    MCV 09/17/2024 90.3  79.0 - 97.0 fL Final    MCH 09/17/2024 32.3  26.6 - 33.0 pg Final    MCHC 09/17/2024 35.7  31.5 - 35.7 g/dL Final    RDW 09/17/2024 12.4  12.3 - 15.4 % Final    RDW-SD 09/17/2024 40.8  37.0 - 54.0 fl Final    MPV 09/17/2024 10.0  6.0 - 12.0 fL Final    Platelets 09/17/2024 104 (L)  140 - 450 10*3/mm3 Final    Neutrophil % 09/17/2024 44.6  42.7 - 76.0 % Final    Lymphocyte % 09/17/2024 42.1  19.6 - 45.3 % Final    Monocyte % 09/17/2024 10.5  5.0 - 12.0 % Final    Eosinophil % 09/17/2024 2.2   0.3 - 6.2 % Final    Basophil % 09/17/2024 0.3  0.0 - 1.5 % Final    Immature Grans % 09/17/2024 0.3  0.0 - 0.5 % Final    Neutrophils, Absolute 09/17/2024 1.62 (L)  1.70 - 7.00 10*3/mm3 Final    Lymphocytes, Absolute 09/17/2024 1.53  0.70 - 3.10 10*3/mm3 Final    Monocytes, Absolute 09/17/2024 0.38  0.10 - 0.90 10*3/mm3 Final    Eosinophils, Absolute 09/17/2024 0.08  0.00 - 0.40 10*3/mm3 Final    Basophils, Absolute 09/17/2024 0.01  0.00 - 0.20 10*3/mm3 Final    Immature Grans, Absolute 09/17/2024 0.01  0.00 - 0.05 10*3/mm3 Final    nRBC 09/17/2024 0.0  0.0 - 0.2 /100 WBC Final    Extra Tube 09/17/2024 Hold for add-ons.   Final    Auto resulted.    Extra Tube 09/17/2024 hold for add-on   Final    Auto resulted    Extra Tube 09/17/2024 Hold for add-ons.   Final    Auto resulted.    Extra Tube 09/17/2024 Hold for add-ons.   Final    Auto resulted    Fentanyl, Urine 09/17/2024 Negative  Negative Final    Glucose 09/18/2024 177 (H)  70 - 130 mg/dL Final   Admission on 09/10/2024, Discharged on 09/11/2024   Component Date Value Ref Range Status    Glucose 09/10/2024 179 (H)  65 - 99 mg/dL Final    BUN 09/10/2024 13  6 - 20 mg/dL Final    Creatinine 09/10/2024 0.75 (L)  0.76 - 1.27 mg/dL Final    Sodium 09/10/2024 139  136 - 145 mmol/L Final    Potassium 09/10/2024 3.5  3.5 - 5.2 mmol/L Final    Slight hemolysis detected by analyzer. Result may be falsely elevated.    Chloride 09/10/2024 103  98 - 107 mmol/L Final    CO2 09/10/2024 23.8  22.0 - 29.0 mmol/L Final    Calcium 09/10/2024 10.4  8.6 - 10.5 mg/dL Final    Total Protein 09/10/2024 7.4  6.0 - 8.5 g/dL Final    Albumin 09/10/2024 4.2  3.5 - 5.2 g/dL Final    ALT (SGPT) 09/10/2024 30  1 - 41 U/L Final    AST (SGOT) 09/10/2024 31  1 - 40 U/L Final    Alkaline Phosphatase 09/10/2024 127 (H)  39 - 117 U/L Final    Total Bilirubin 09/10/2024 0.6  0.0 - 1.2 mg/dL Final    Globulin 09/10/2024 3.2  gm/dL Final    A/G Ratio 09/10/2024 1.3  g/dL Final    BUN/Creatinine  Ratio 09/10/2024 17.3  7.0 - 25.0 Final    Anion Gap 09/10/2024 12.2  5.0 - 15.0 mmol/L Final    eGFR 09/10/2024 131.7  >60.0 mL/min/1.73 Final    Lipase 09/10/2024 35  13 - 60 U/L Final    Color, UA 09/10/2024 Yellow  Yellow, Straw Final    Appearance, UA 09/10/2024 Clear  Clear Final    pH, UA 09/10/2024 6.0  5.0 - 8.0 Final    Specific Gravity, UA 09/10/2024 1.018  1.005 - 1.030 Final    Glucose, UA 09/10/2024 100 mg/dL (Trace) (A)  Negative Final    Ketones, UA 09/10/2024 Negative  Negative Final    Bilirubin, UA 09/10/2024 Negative  Negative Final    Blood, UA 09/10/2024 Negative  Negative Final    Protein, UA 09/10/2024 Negative  Negative Final    Leuk Esterase, UA 09/10/2024 Negative  Negative Final    Nitrite, UA 09/10/2024 Negative  Negative Final    Urobilinogen, UA 09/10/2024 1.0 E.U./dL  0.2 - 1.0 E.U./dL Final    Extra Tube 09/10/2024 Hold for add-ons.   Final    Auto resulted.    Extra Tube 09/10/2024 hold for add-on   Final    Auto resulted    Extra Tube 09/10/2024 Hold for add-ons.   Final    Auto resulted.    Extra Tube 09/10/2024 Hold for add-ons.   Final    Auto resulted    WBC 09/10/2024 4.84  3.40 - 10.80 10*3/mm3 Final    RBC 09/10/2024 4.33  4.14 - 5.80 10*6/mm3 Final    Hemoglobin 09/10/2024 13.8  13.0 - 17.7 g/dL Final    Hematocrit 09/10/2024 39.2  37.5 - 51.0 % Final    MCV 09/10/2024 90.5  79.0 - 97.0 fL Final    MCH 09/10/2024 31.9  26.6 - 33.0 pg Final    MCHC 09/10/2024 35.2  31.5 - 35.7 g/dL Final    RDW 09/10/2024 12.5  12.3 - 15.4 % Final    RDW-SD 09/10/2024 40.8  37.0 - 54.0 fl Final    MPV 09/10/2024 9.9  6.0 - 12.0 fL Final    Platelets 09/10/2024 130 (L)  140 - 450 10*3/mm3 Final    Neutrophil % 09/10/2024 47.9  42.7 - 76.0 % Final    Lymphocyte % 09/10/2024 40.5  19.6 - 45.3 % Final    Monocyte % 09/10/2024 9.1  5.0 - 12.0 % Final    Eosinophil % 09/10/2024 2.1  0.3 - 6.2 % Final    Basophil % 09/10/2024 0.2  0.0 - 1.5 % Final    Immature Grans % 09/10/2024 0.2  0.0 - 0.5 %  Final    Neutrophils, Absolute 09/10/2024 2.32  1.70 - 7.00 10*3/mm3 Final    Lymphocytes, Absolute 09/10/2024 1.96  0.70 - 3.10 10*3/mm3 Final    Monocytes, Absolute 09/10/2024 0.44  0.10 - 0.90 10*3/mm3 Final    Eosinophils, Absolute 09/10/2024 0.10  0.00 - 0.40 10*3/mm3 Final    Basophils, Absolute 09/10/2024 0.01  0.00 - 0.20 10*3/mm3 Final    Immature Grans, Absolute 09/10/2024 0.01  0.00 - 0.05 10*3/mm3 Final    nRBC 09/10/2024 0.0  0.0 - 0.2 /100 WBC Final        Condition on Discharge:  improved    Vital Signs  Temp:  [97 °F (36.1 °C)-97.4 °F (36.3 °C)] 97 °F (36.1 °C)  Heart Rate:  [64-85] 85  Resp:  [16-18] 18  BP: (125-144)/(64-70) 125/64      Discharge Disposition:  Rehab Facility or Unit (DC - External)    Discharge Medications:     Discharge Medications        Continue These Medications        Instructions Start Date   famotidine 20 MG tablet  Commonly known as: PEPCID   20 mg, Oral, 2 Times Daily PRN      HumaLOG KwikPen 100 UNIT/ML solution pen-injector  Generic drug: Insulin Lispro (1 Unit Dial)   0-25 Units, Subcutaneous, 4 Times Daily With Meals & Nightly      hydrOXYzine 25 MG tablet  Commonly known as: ATARAX   25 mg, Oral, Every 4 Hours PRN      lamoTRIgine 25 MG tablet  Commonly known as: LaMICtal   Take 1 tablet by mouth Daily for 14 days, THEN 2 tablets Daily for 14 days. Indications: Manic-Depression   Start Date: September 24, 2024     Lantus SoloStar 100 UNIT/ML injection pen  Generic drug: Insulin Glargine   35 Units, Subcutaneous, 2 Times Daily      Lurasidone HCl 20 MG tablet tablet  Commonly known as: LATUDA   20 mg, Oral, Daily With Dinner      metFORMIN 500 MG tablet  Commonly known as: GLUCOPHAGE   500 mg, Oral, 2 Times Daily With Meals      traZODone 50 MG tablet  Commonly known as: DESYREL   50 mg, Oral, Nightly               Discharge Diet: Consistent carbohydrate diet     Activity at Discharge: As tolerated     Follow-up Appointments  Future Appointments   Date Time Provider  Department Center   10/21/2024  3:15 PM PAT 3 COR [facility] PAT [facility]     [facility]        Time: I spent  >30  minutes on this discharge activity which included: face-to-face encounter with the patient, reviewing the data in the system, coordination of the care with the nursing staff as well as consultants, documentation, and entering orders.        Clinician:   Ashley Thomas MD  10/07/24  09:39 EDT    Electronically signed by Ashley Thomas MD at 10/07/24 0949

## 2024-10-08 NOTE — NURSING NOTE
Faxed clinicals to:  [facility] does have beds available but require EKG. Will obtain if other facilities are not able to place patient. [facility] do not have beds available.

## 2024-10-21 ENCOUNTER — TELEPHONE (OUTPATIENT)
Age: 19
End: 2024-10-21
Payer: COMMERCIAL

## 2024-10-21 NOTE — TELEPHONE ENCOUNTER
I have made numerous attempts to try and reach patient to give him a time to be here for [facility] scheduled for 10/23 With Dr. Srinivasan. I have not been able to get in contact with the patient. I will cancel surgery at this time until patient gets back in touch with me.